# Patient Record
Sex: FEMALE | Race: BLACK OR AFRICAN AMERICAN | NOT HISPANIC OR LATINO | Employment: OTHER | RURAL
[De-identification: names, ages, dates, MRNs, and addresses within clinical notes are randomized per-mention and may not be internally consistent; named-entity substitution may affect disease eponyms.]

---

## 2019-03-14 ENCOUNTER — HISTORICAL (OUTPATIENT)
Dept: ADMINISTRATIVE | Facility: HOSPITAL | Age: 54
End: 2019-03-14

## 2019-03-18 LAB
LAB AP CLINICAL INFORMATION: NORMAL
LAB AP GENERAL CAT - HISTORICAL: NORMAL
LAB AP INTERPRETATION/RESULT - HISTORICAL: NEGATIVE
LAB AP SPECIMEN ADEQUACY - HISTORICAL: NORMAL
LAB AP SPECIMEN SUBMITTED - HISTORICAL: NORMAL

## 2019-04-22 ENCOUNTER — HISTORICAL (OUTPATIENT)
Dept: ADMINISTRATIVE | Facility: HOSPITAL | Age: 54
End: 2019-04-22

## 2019-04-23 LAB
LAB AP CLINICAL INFORMATION: NORMAL
LAB AP DIAGNOSIS - HISTORICAL: NORMAL
LAB AP GROSS PATHOLOGY - HISTORICAL: NORMAL
LAB AP SPECIMEN SUBMITTED - HISTORICAL: NORMAL

## 2020-09-11 ENCOUNTER — HISTORICAL (OUTPATIENT)
Dept: ADMINISTRATIVE | Facility: HOSPITAL | Age: 55
End: 2020-09-11

## 2021-05-04 ENCOUNTER — OFFICE VISIT (OUTPATIENT)
Dept: ORTHOPEDICS | Facility: CLINIC | Age: 56
End: 2021-05-04
Payer: MEDICARE

## 2021-05-04 VITALS — WEIGHT: 193 LBS | HEIGHT: 62 IN | BODY MASS INDEX: 35.51 KG/M2

## 2021-05-04 DIAGNOSIS — M17.11 LOCALIZED OSTEOARTHRITIS OF RIGHT KNEE: Primary | ICD-10-CM

## 2021-05-04 DIAGNOSIS — M17.11 PRIMARY OSTEOARTHRITIS OF RIGHT KNEE: Primary | ICD-10-CM

## 2021-05-04 PROCEDURE — 20610 LARGE JOINT ASPIRATION/INJECTION: R SUPRA PATELLAR BURSA: ICD-10-PCS | Mod: RT,,, | Performed by: ORTHOPAEDIC SURGERY

## 2021-05-04 PROCEDURE — 20610 DRAIN/INJ JOINT/BURSA W/O US: CPT | Mod: RT,,, | Performed by: ORTHOPAEDIC SURGERY

## 2021-05-04 RX ORDER — TRIAMCINOLONE ACETONIDE 40 MG/ML
40 INJECTION, SUSPENSION INTRA-ARTICULAR; INTRAMUSCULAR
Status: DISCONTINUED | OUTPATIENT
Start: 2021-05-04 | End: 2021-05-04 | Stop reason: HOSPADM

## 2021-05-04 RX ORDER — HYDROCHLOROTHIAZIDE 25 MG/1
25 TABLET ORAL DAILY
COMMUNITY
Start: 2021-03-26

## 2021-05-04 RX ORDER — CYCLOBENZAPRINE HCL 10 MG
TABLET ORAL
Status: ON HOLD | COMMUNITY
Start: 2021-03-26 | End: 2023-07-12 | Stop reason: SDUPTHER

## 2021-05-04 RX ORDER — ANASTROZOLE 1 MG/1
1 TABLET ORAL DAILY
COMMUNITY
Start: 2021-03-30

## 2021-05-04 RX ORDER — GLIPIZIDE 10 MG/1
TABLET ORAL
COMMUNITY
Start: 2021-03-26

## 2021-05-04 RX ORDER — BUPIVACAINE HYDROCHLORIDE 5 MG/ML
3 INJECTION, SOLUTION PERINEURAL
Status: DISCONTINUED | OUTPATIENT
Start: 2021-05-04 | End: 2021-05-04 | Stop reason: HOSPADM

## 2021-05-04 RX ORDER — AMLODIPINE BESYLATE 5 MG/1
5 TABLET ORAL DAILY
COMMUNITY
Start: 2021-03-26

## 2021-05-04 RX ADMIN — BUPIVACAINE HYDROCHLORIDE 3 ML: 5 INJECTION, SOLUTION PERINEURAL at 09:05

## 2021-05-04 RX ADMIN — TRIAMCINOLONE ACETONIDE 40 MG: 40 INJECTION, SUSPENSION INTRA-ARTICULAR; INTRAMUSCULAR at 09:05

## 2021-08-03 DIAGNOSIS — M25.561 RIGHT KNEE PAIN, UNSPECIFIED CHRONICITY: Primary | ICD-10-CM

## 2021-08-05 ENCOUNTER — OFFICE VISIT (OUTPATIENT)
Dept: OBSTETRICS AND GYNECOLOGY | Facility: CLINIC | Age: 56
End: 2021-08-05
Payer: MEDICARE

## 2021-08-05 ENCOUNTER — HOSPITAL ENCOUNTER (OUTPATIENT)
Dept: RADIOLOGY | Facility: HOSPITAL | Age: 56
Discharge: HOME | End: 2021-08-05
Attending: ORTHOPAEDIC SURGERY
Payer: MEDICARE

## 2021-08-05 ENCOUNTER — OFFICE VISIT (OUTPATIENT)
Dept: ORTHOPEDICS | Facility: CLINIC | Age: 56
End: 2021-08-05
Payer: MEDICARE

## 2021-08-05 VITALS
TEMPERATURE: 98 F | BODY MASS INDEX: 35.55 KG/M2 | WEIGHT: 193.19 LBS | HEART RATE: 109 BPM | DIASTOLIC BLOOD PRESSURE: 86 MMHG | RESPIRATION RATE: 20 BRPM | SYSTOLIC BLOOD PRESSURE: 149 MMHG | HEIGHT: 62 IN

## 2021-08-05 VITALS — WEIGHT: 193 LBS | HEIGHT: 62 IN | BODY MASS INDEX: 35.51 KG/M2

## 2021-08-05 DIAGNOSIS — Z01.419 ENCOUNTER FOR WELL WOMAN EXAM WITH ROUTINE GYNECOLOGICAL EXAM: Primary | ICD-10-CM

## 2021-08-05 DIAGNOSIS — S83.241D ACUTE MEDIAL MENISCUS TEAR OF RIGHT KNEE, SUBSEQUENT ENCOUNTER: Primary | ICD-10-CM

## 2021-08-05 DIAGNOSIS — M25.561 RIGHT KNEE PAIN, UNSPECIFIED CHRONICITY: ICD-10-CM

## 2021-08-05 PROBLEM — S83.241A ACUTE MEDIAL MENISCUS TEAR OF RIGHT KNEE: Status: ACTIVE | Noted: 2021-08-05

## 2021-08-05 PROCEDURE — 99396 PR PREVENTIVE VISIT,EST,40-64: ICD-10-PCS | Mod: S$PBB,,, | Performed by: OBSTETRICS & GYNECOLOGY

## 2021-08-05 PROCEDURE — 3079F PR MOST RECENT DIASTOLIC BLOOD PRESSURE 80-89 MM HG: ICD-10-PCS | Mod: CPTII,,, | Performed by: OBSTETRICS & GYNECOLOGY

## 2021-08-05 PROCEDURE — 73562 XR KNEE ORTHO RIGHT: ICD-10-PCS | Mod: 26,RT,, | Performed by: ORTHOPAEDIC SURGERY

## 2021-08-05 PROCEDURE — 99214 OFFICE O/P EST MOD 30 MIN: CPT | Mod: PBBFAC | Performed by: OBSTETRICS & GYNECOLOGY

## 2021-08-05 PROCEDURE — 99214 OFFICE O/P EST MOD 30 MIN: CPT | Mod: ,,, | Performed by: ORTHOPAEDIC SURGERY

## 2021-08-05 PROCEDURE — 3079F DIAST BP 80-89 MM HG: CPT | Mod: CPTII,,, | Performed by: OBSTETRICS & GYNECOLOGY

## 2021-08-05 PROCEDURE — 3008F PR BODY MASS INDEX (BMI) DOCUMENTED: ICD-10-PCS | Mod: CPTII,,, | Performed by: OBSTETRICS & GYNECOLOGY

## 2021-08-05 PROCEDURE — 1159F MED LIST DOCD IN RCRD: CPT | Mod: CPTII,,, | Performed by: OBSTETRICS & GYNECOLOGY

## 2021-08-05 PROCEDURE — 99396 PREV VISIT EST AGE 40-64: CPT | Mod: S$PBB,,, | Performed by: OBSTETRICS & GYNECOLOGY

## 2021-08-05 PROCEDURE — 3008F BODY MASS INDEX DOCD: CPT | Mod: CPTII,,, | Performed by: OBSTETRICS & GYNECOLOGY

## 2021-08-05 PROCEDURE — 99214 PR OFFICE/OUTPT VISIT, EST, LEVL IV, 30-39 MIN: ICD-10-PCS | Mod: ,,, | Performed by: ORTHOPAEDIC SURGERY

## 2021-08-05 PROCEDURE — 73560 X-RAY EXAM OF KNEE 1 OR 2: CPT | Mod: TC,LT,59

## 2021-08-05 PROCEDURE — 3077F PR MOST RECENT SYSTOLIC BLOOD PRESSURE >= 140 MM HG: ICD-10-PCS | Mod: CPTII,,, | Performed by: OBSTETRICS & GYNECOLOGY

## 2021-08-05 PROCEDURE — 1159F PR MEDICATION LIST DOCUMENTED IN MEDICAL RECORD: ICD-10-PCS | Mod: CPTII,,, | Performed by: OBSTETRICS & GYNECOLOGY

## 2021-08-05 PROCEDURE — 73562 X-RAY EXAM OF KNEE 3: CPT | Mod: 26,RT,, | Performed by: ORTHOPAEDIC SURGERY

## 2021-08-05 PROCEDURE — 3077F SYST BP >= 140 MM HG: CPT | Mod: CPTII,,, | Performed by: OBSTETRICS & GYNECOLOGY

## 2021-08-05 RX ORDER — CALCIUM CARBONATE 600 MG
600 TABLET ORAL DAILY
Status: ON HOLD | COMMUNITY
End: 2023-07-12 | Stop reason: SDUPTHER

## 2021-08-05 RX ORDER — ROSUVASTATIN CALCIUM 10 MG/1
10 TABLET, COATED ORAL DAILY
COMMUNITY

## 2021-08-05 RX ORDER — HYDROCODONE BITARTRATE AND ACETAMINOPHEN 7.5; 325 MG/1; MG/1
1 TABLET ORAL 3 TIMES DAILY
Status: ON HOLD | COMMUNITY
End: 2023-06-27 | Stop reason: HOSPADM

## 2021-08-05 RX ORDER — MUPIROCIN 20 MG/G
OINTMENT TOPICAL
Status: CANCELLED | OUTPATIENT
Start: 2021-08-05

## 2021-08-05 RX ORDER — SODIUM CHLORIDE 9 MG/ML
INJECTION, SOLUTION INTRAVENOUS CONTINUOUS
Status: CANCELLED | OUTPATIENT
Start: 2021-08-05

## 2021-08-05 RX ORDER — METFORMIN HYDROCHLORIDE 1000 MG/1
1000 TABLET ORAL 2 TIMES DAILY WITH MEALS
COMMUNITY

## 2021-09-07 DIAGNOSIS — Z11.59 SPECIAL SCREENING EXAMINATION FOR UNSPECIFIED VIRAL DISEASE: Primary | ICD-10-CM

## 2021-09-08 ENCOUNTER — ANESTHESIA EVENT (OUTPATIENT)
Dept: SURGERY | Facility: HOSPITAL | Age: 56
End: 2021-09-08
Payer: MEDICARE

## 2021-09-08 ENCOUNTER — ANESTHESIA (OUTPATIENT)
Dept: SURGERY | Facility: HOSPITAL | Age: 56
End: 2021-09-08
Payer: MEDICARE

## 2021-09-08 ENCOUNTER — HOSPITAL ENCOUNTER (OUTPATIENT)
Facility: HOSPITAL | Age: 56
Discharge: HOME | End: 2021-09-08
Attending: ORTHOPAEDIC SURGERY | Admitting: ORTHOPAEDIC SURGERY
Payer: MEDICARE

## 2021-09-08 VITALS
RESPIRATION RATE: 16 BRPM | TEMPERATURE: 98 F | WEIGHT: 189 LBS | HEART RATE: 96 BPM | SYSTOLIC BLOOD PRESSURE: 170 MMHG | DIASTOLIC BLOOD PRESSURE: 89 MMHG | BODY MASS INDEX: 34.57 KG/M2 | OXYGEN SATURATION: 99 %

## 2021-09-08 DIAGNOSIS — S83.241D ACUTE MEDIAL MENISCUS TEAR OF RIGHT KNEE, SUBSEQUENT ENCOUNTER: Primary | ICD-10-CM

## 2021-09-08 LAB
GLUCOSE SERPL-MCNC: 153 MG/DL (ref 70–105)
GLUCOSE SERPL-MCNC: 185 MG/DL (ref 70–105)

## 2021-09-08 PROCEDURE — D9220A PRA ANESTHESIA: Mod: ANES,,, | Performed by: ANESTHESIOLOGY

## 2021-09-08 PROCEDURE — 29881 PR KNEE SCOPE SINGLE MENISECECTOMY: ICD-10-PCS | Mod: RT,,, | Performed by: ORTHOPAEDIC SURGERY

## 2021-09-08 PROCEDURE — 82962 GLUCOSE BLOOD TEST: CPT | Mod: 91

## 2021-09-08 PROCEDURE — 37000009 HC ANESTHESIA EA ADD 15 MINS: Performed by: ORTHOPAEDIC SURGERY

## 2021-09-08 PROCEDURE — 63600175 PHARM REV CODE 636 W HCPCS: Performed by: ANESTHESIOLOGY

## 2021-09-08 PROCEDURE — 71000016 HC POSTOP RECOV ADDL HR: Performed by: ORTHOPAEDIC SURGERY

## 2021-09-08 PROCEDURE — 29879 ARTHRS KNE SRG ABRASJ ARTHRP: CPT | Mod: RT,,, | Performed by: ORTHOPAEDIC SURGERY

## 2021-09-08 PROCEDURE — 36000711: Performed by: ORTHOPAEDIC SURGERY

## 2021-09-08 PROCEDURE — 37000008 HC ANESTHESIA 1ST 15 MINUTES: Performed by: ORTHOPAEDIC SURGERY

## 2021-09-08 PROCEDURE — 29881 ARTHRS KNE SRG MNISECTMY M/L: CPT | Mod: RT,,, | Performed by: ORTHOPAEDIC SURGERY

## 2021-09-08 PROCEDURE — 71000033 HC RECOVERY, INTIAL HOUR: Performed by: ORTHOPAEDIC SURGERY

## 2021-09-08 PROCEDURE — D9220A PRA ANESTHESIA: Mod: CRNA,,, | Performed by: NURSE ANESTHETIST, CERTIFIED REGISTERED

## 2021-09-08 PROCEDURE — D9220A PRA ANESTHESIA: ICD-10-PCS | Mod: ANES,,, | Performed by: ANESTHESIOLOGY

## 2021-09-08 PROCEDURE — 27100168 OPTIME MED/SURG SUP & DEVICES NON-STERILE SUPPLY: Performed by: ORTHOPAEDIC SURGERY

## 2021-09-08 PROCEDURE — 71000015 HC POSTOP RECOV 1ST HR: Performed by: ORTHOPAEDIC SURGERY

## 2021-09-08 PROCEDURE — 01400 ANES OPN/ARTHRS KNEE JT NOS: CPT | Performed by: ORTHOPAEDIC SURGERY

## 2021-09-08 PROCEDURE — D9220A PRA ANESTHESIA: ICD-10-PCS | Mod: CRNA,,, | Performed by: NURSE ANESTHETIST, CERTIFIED REGISTERED

## 2021-09-08 PROCEDURE — 25000003 PHARM REV CODE 250: Performed by: ORTHOPAEDIC SURGERY

## 2021-09-08 PROCEDURE — 36000710: Performed by: ORTHOPAEDIC SURGERY

## 2021-09-08 PROCEDURE — 63600175 PHARM REV CODE 636 W HCPCS: Performed by: ORTHOPAEDIC SURGERY

## 2021-09-08 PROCEDURE — 97161 PT EVAL LOW COMPLEX 20 MIN: CPT

## 2021-09-08 PROCEDURE — 27201423 OPTIME MED/SURG SUP & DEVICES STERILE SUPPLY: Performed by: ORTHOPAEDIC SURGERY

## 2021-09-08 PROCEDURE — 25000003 PHARM REV CODE 250: Performed by: NURSE ANESTHETIST, CERTIFIED REGISTERED

## 2021-09-08 PROCEDURE — 29879 PR KNEE SCOPE,ABRASN ARTHROPLASTY: ICD-10-PCS | Mod: RT,,, | Performed by: ORTHOPAEDIC SURGERY

## 2021-09-08 PROCEDURE — 63600175 PHARM REV CODE 636 W HCPCS: Performed by: NURSE ANESTHETIST, CERTIFIED REGISTERED

## 2021-09-08 RX ORDER — LIDOCAINE HYDROCHLORIDE 10 MG/ML
1 INJECTION, SOLUTION EPIDURAL; INFILTRATION; INTRACAUDAL; PERINEURAL ONCE
Status: CANCELLED | OUTPATIENT
Start: 2021-09-08 | End: 2021-09-08

## 2021-09-08 RX ORDER — MEPERIDINE HYDROCHLORIDE 25 MG/ML
25 INJECTION INTRAMUSCULAR; INTRAVENOUS; SUBCUTANEOUS EVERY 10 MIN PRN
Status: DISCONTINUED | OUTPATIENT
Start: 2021-09-08 | End: 2021-09-08 | Stop reason: HOSPADM

## 2021-09-08 RX ORDER — DIPHENHYDRAMINE HYDROCHLORIDE 50 MG/ML
25 INJECTION INTRAMUSCULAR; INTRAVENOUS EVERY 6 HOURS PRN
Status: DISCONTINUED | OUTPATIENT
Start: 2021-09-08 | End: 2021-09-08 | Stop reason: HOSPADM

## 2021-09-08 RX ORDER — MORPHINE SULFATE 10 MG/ML
4 INJECTION INTRAMUSCULAR; INTRAVENOUS; SUBCUTANEOUS EVERY 5 MIN PRN
Status: DISCONTINUED | OUTPATIENT
Start: 2021-09-08 | End: 2021-09-08 | Stop reason: HOSPADM

## 2021-09-08 RX ORDER — FENTANYL CITRATE 50 UG/ML
INJECTION, SOLUTION INTRAMUSCULAR; INTRAVENOUS
Status: DISCONTINUED | OUTPATIENT
Start: 2021-09-08 | End: 2021-09-08

## 2021-09-08 RX ORDER — LIDOCAINE HYDROCHLORIDE 20 MG/ML
INJECTION, SOLUTION EPIDURAL; INFILTRATION; INTRACAUDAL; PERINEURAL
Status: DISCONTINUED | OUTPATIENT
Start: 2021-09-08 | End: 2021-09-08

## 2021-09-08 RX ORDER — OXYCODONE HYDROCHLORIDE 5 MG/1
5 TABLET ORAL
Status: DISCONTINUED | OUTPATIENT
Start: 2021-09-08 | End: 2021-09-08 | Stop reason: HOSPADM

## 2021-09-08 RX ORDER — OXYCODONE HYDROCHLORIDE 5 MG/1
10 TABLET ORAL EVERY 4 HOURS PRN
Status: DISCONTINUED | OUTPATIENT
Start: 2021-09-08 | End: 2021-09-08 | Stop reason: HOSPADM

## 2021-09-08 RX ORDER — CEFAZOLIN SODIUM 1 G/3ML
INJECTION, POWDER, FOR SOLUTION INTRAMUSCULAR; INTRAVENOUS
Status: DISCONTINUED | OUTPATIENT
Start: 2021-09-08 | End: 2021-09-08

## 2021-09-08 RX ORDER — SODIUM CHLORIDE 9 MG/ML
INJECTION, SOLUTION INTRAVENOUS CONTINUOUS
Status: DISCONTINUED | OUTPATIENT
Start: 2021-09-08 | End: 2021-09-08 | Stop reason: HOSPADM

## 2021-09-08 RX ORDER — OXYCODONE AND ACETAMINOPHEN 5; 325 MG/1; MG/1
1 TABLET ORAL EVERY 4 HOURS PRN
Status: DISCONTINUED | OUTPATIENT
Start: 2021-09-08 | End: 2021-09-08 | Stop reason: HOSPADM

## 2021-09-08 RX ORDER — DEXAMETHASONE SODIUM PHOSPHATE 4 MG/ML
INJECTION, SOLUTION INTRA-ARTICULAR; INTRALESIONAL; INTRAMUSCULAR; INTRAVENOUS; SOFT TISSUE
Status: DISCONTINUED | OUTPATIENT
Start: 2021-09-08 | End: 2021-09-08

## 2021-09-08 RX ORDER — ONDANSETRON 2 MG/ML
INJECTION INTRAMUSCULAR; INTRAVENOUS
Status: DISCONTINUED | OUTPATIENT
Start: 2021-09-08 | End: 2021-09-08

## 2021-09-08 RX ORDER — ONDANSETRON 4 MG/1
4 TABLET, ORALLY DISINTEGRATING ORAL EVERY 6 HOURS PRN
Qty: 30 TABLET | Refills: 0 | Status: SHIPPED | OUTPATIENT
Start: 2021-09-08

## 2021-09-08 RX ORDER — MORPHINE SULFATE 8 MG/ML
INJECTION INTRAMUSCULAR; INTRAVENOUS; SUBCUTANEOUS
Status: DISCONTINUED | OUTPATIENT
Start: 2021-09-08 | End: 2021-09-08

## 2021-09-08 RX ORDER — MIDAZOLAM HYDROCHLORIDE 1 MG/ML
INJECTION INTRAMUSCULAR; INTRAVENOUS
Status: DISCONTINUED | OUTPATIENT
Start: 2021-09-08 | End: 2021-09-08

## 2021-09-08 RX ORDER — PROPOFOL 10 MG/ML
VIAL (ML) INTRAVENOUS
Status: DISCONTINUED | OUTPATIENT
Start: 2021-09-08 | End: 2021-09-08

## 2021-09-08 RX ORDER — SODIUM CHLORIDE, SODIUM LACTATE, POTASSIUM CHLORIDE, CALCIUM CHLORIDE 600; 310; 30; 20 MG/100ML; MG/100ML; MG/100ML; MG/100ML
INJECTION, SOLUTION INTRAVENOUS CONTINUOUS
Status: CANCELLED | OUTPATIENT
Start: 2021-09-08

## 2021-09-08 RX ORDER — ONDANSETRON 2 MG/ML
4 INJECTION INTRAMUSCULAR; INTRAVENOUS DAILY PRN
Status: DISCONTINUED | OUTPATIENT
Start: 2021-09-08 | End: 2021-09-08 | Stop reason: HOSPADM

## 2021-09-08 RX ORDER — EPINEPHRINE 1 MG/ML
INJECTION, SOLUTION INTRACARDIAC; INTRAMUSCULAR; INTRAVENOUS; SUBCUTANEOUS
Status: DISCONTINUED | OUTPATIENT
Start: 2021-09-08 | End: 2021-09-08 | Stop reason: HOSPADM

## 2021-09-08 RX ORDER — CEFAZOLIN SODIUM 2 G/50ML
2 SOLUTION INTRAVENOUS
Status: DISCONTINUED | OUTPATIENT
Start: 2021-09-08 | End: 2021-09-08 | Stop reason: HOSPADM

## 2021-09-08 RX ORDER — HYDROMORPHONE HYDROCHLORIDE 2 MG/ML
0.5 INJECTION, SOLUTION INTRAMUSCULAR; INTRAVENOUS; SUBCUTANEOUS EVERY 5 MIN PRN
Status: DISCONTINUED | OUTPATIENT
Start: 2021-09-08 | End: 2021-09-08 | Stop reason: HOSPADM

## 2021-09-08 RX ORDER — ONDANSETRON 4 MG/1
8 TABLET, ORALLY DISINTEGRATING ORAL EVERY 8 HOURS PRN
Status: DISCONTINUED | OUTPATIENT
Start: 2021-09-08 | End: 2021-09-08 | Stop reason: HOSPADM

## 2021-09-08 RX ORDER — LABETALOL HYDROCHLORIDE 5 MG/ML
INJECTION, SOLUTION INTRAVENOUS
Status: DISCONTINUED | OUTPATIENT
Start: 2021-09-08 | End: 2021-09-08

## 2021-09-08 RX ORDER — HYDROCODONE BITARTRATE AND ACETAMINOPHEN 5; 325 MG/1; MG/1
1 TABLET ORAL EVERY 6 HOURS PRN
Qty: 30 TABLET | Refills: 0 | Status: ON HOLD | OUTPATIENT
Start: 2021-09-08 | End: 2023-06-27 | Stop reason: HOSPADM

## 2021-09-08 RX ADMIN — CEFAZOLIN 2 G: 1 INJECTION, POWDER, FOR SOLUTION INTRAMUSCULAR; INTRAVENOUS; PARENTERAL at 12:09

## 2021-09-08 RX ADMIN — SODIUM CHLORIDE: 9 INJECTION, SOLUTION INTRAVENOUS at 10:09

## 2021-09-08 RX ADMIN — MORPHINE SULFATE 8 MG: 8 INJECTION INTRAVENOUS at 01:09

## 2021-09-08 RX ADMIN — MIDAZOLAM HYDROCHLORIDE 2 MG: 1 INJECTION, SOLUTION INTRAMUSCULAR; INTRAVENOUS at 12:09

## 2021-09-08 RX ADMIN — FENTANYL CITRATE 100 MCG: 50 INJECTION INTRAMUSCULAR; INTRAVENOUS at 12:09

## 2021-09-08 RX ADMIN — ONDANSETRON 4 MG: 2 INJECTION INTRAMUSCULAR; INTRAVENOUS at 12:09

## 2021-09-08 RX ADMIN — PROPOFOL 200 MG: 10 INJECTION, EMULSION INTRAVENOUS at 12:09

## 2021-09-08 RX ADMIN — LABETALOL HYDROCHLORIDE 10 MG: 5 INJECTION, SOLUTION INTRAVENOUS at 01:09

## 2021-09-08 RX ADMIN — DEXAMETHASONE SODIUM PHOSPHATE 10 MG: 4 INJECTION, SOLUTION INTRA-ARTICULAR; INTRALESIONAL; INTRAMUSCULAR; INTRAVENOUS; SOFT TISSUE at 12:09

## 2021-09-08 RX ADMIN — HYDROMORPHONE HYDROCHLORIDE 0.5 MG: 2 INJECTION, SOLUTION INTRAMUSCULAR; INTRAVENOUS; SUBCUTANEOUS at 01:09

## 2021-09-08 RX ADMIN — LIDOCAINE HYDROCHLORIDE 100 MG: 20 INJECTION, SOLUTION EPIDURAL; INFILTRATION; INTRACAUDAL; PERINEURAL at 12:09

## 2021-09-10 DIAGNOSIS — Z98.890 S/P RIGHT KNEE ARTHROSCOPY: Primary | ICD-10-CM

## 2021-09-15 ENCOUNTER — CLINICAL SUPPORT (OUTPATIENT)
Dept: REHABILITATION | Facility: HOSPITAL | Age: 56
End: 2021-09-15
Payer: MEDICARE

## 2021-09-15 DIAGNOSIS — S83.241D ACUTE MEDIAL MENISCUS TEAR OF RIGHT KNEE, SUBSEQUENT ENCOUNTER: ICD-10-CM

## 2021-09-15 PROCEDURE — 97162 PT EVAL MOD COMPLEX 30 MIN: CPT

## 2021-09-15 PROCEDURE — 97110 THERAPEUTIC EXERCISES: CPT

## 2021-09-21 ENCOUNTER — OFFICE VISIT (OUTPATIENT)
Dept: ORTHOPEDICS | Facility: CLINIC | Age: 56
End: 2021-09-21
Payer: MEDICARE

## 2021-09-21 DIAGNOSIS — Z98.890 S/P RIGHT KNEE ARTHROSCOPY: Primary | ICD-10-CM

## 2021-09-21 PROCEDURE — 1159F PR MEDICATION LIST DOCUMENTED IN MEDICAL RECORD: ICD-10-PCS | Mod: CPTII,,, | Performed by: ORTHOPAEDIC SURGERY

## 2021-09-21 PROCEDURE — 1159F MED LIST DOCD IN RCRD: CPT | Mod: CPTII,,, | Performed by: ORTHOPAEDIC SURGERY

## 2021-09-21 PROCEDURE — 99024 POSTOP FOLLOW-UP VISIT: CPT | Mod: ,,, | Performed by: ORTHOPAEDIC SURGERY

## 2021-09-21 PROCEDURE — 99024 PR POST-OP FOLLOW-UP VISIT: ICD-10-PCS | Mod: ,,, | Performed by: ORTHOPAEDIC SURGERY

## 2021-09-24 ENCOUNTER — CLINICAL SUPPORT (OUTPATIENT)
Dept: REHABILITATION | Facility: HOSPITAL | Age: 56
End: 2021-09-24
Payer: MEDICARE

## 2021-09-24 DIAGNOSIS — S83.241A ACUTE MEDIAL MENISCUS TEAR OF RIGHT KNEE, INITIAL ENCOUNTER: ICD-10-CM

## 2021-09-24 PROCEDURE — 97110 THERAPEUTIC EXERCISES: CPT

## 2021-09-28 ENCOUNTER — CLINICAL SUPPORT (OUTPATIENT)
Dept: REHABILITATION | Facility: HOSPITAL | Age: 56
End: 2021-09-28
Payer: MEDICARE

## 2021-09-28 DIAGNOSIS — S83.241A ACUTE MEDIAL MENISCUS TEAR OF RIGHT KNEE, INITIAL ENCOUNTER: Primary | ICD-10-CM

## 2021-09-28 PROCEDURE — 97110 THERAPEUTIC EXERCISES: CPT | Mod: CQ

## 2021-09-30 ENCOUNTER — CLINICAL SUPPORT (OUTPATIENT)
Dept: REHABILITATION | Facility: HOSPITAL | Age: 56
End: 2021-09-30
Payer: MEDICARE

## 2021-09-30 DIAGNOSIS — S83.241A ACUTE MEDIAL MENISCUS TEAR OF RIGHT KNEE, INITIAL ENCOUNTER: ICD-10-CM

## 2021-09-30 PROCEDURE — 97110 THERAPEUTIC EXERCISES: CPT

## 2021-10-06 ENCOUNTER — CLINICAL SUPPORT (OUTPATIENT)
Dept: REHABILITATION | Facility: HOSPITAL | Age: 56
End: 2021-10-06
Payer: MEDICARE

## 2021-10-06 DIAGNOSIS — S83.241A ACUTE MEDIAL MENISCUS TEAR OF RIGHT KNEE, INITIAL ENCOUNTER: Primary | ICD-10-CM

## 2021-10-06 PROCEDURE — 97110 THERAPEUTIC EXERCISES: CPT | Mod: CQ

## 2021-10-08 ENCOUNTER — CLINICAL SUPPORT (OUTPATIENT)
Dept: REHABILITATION | Facility: HOSPITAL | Age: 56
End: 2021-10-08
Payer: MEDICARE

## 2021-10-08 DIAGNOSIS — S83.241A ACUTE MEDIAL MENISCUS TEAR OF RIGHT KNEE, INITIAL ENCOUNTER: Primary | ICD-10-CM

## 2021-10-08 PROCEDURE — 97110 THERAPEUTIC EXERCISES: CPT | Mod: CQ

## 2021-10-11 ENCOUNTER — CLINICAL SUPPORT (OUTPATIENT)
Dept: REHABILITATION | Facility: HOSPITAL | Age: 56
End: 2021-10-11
Payer: MEDICARE

## 2021-10-11 DIAGNOSIS — S83.241A ACUTE MEDIAL MENISCUS TEAR OF RIGHT KNEE, INITIAL ENCOUNTER: Primary | ICD-10-CM

## 2021-10-11 PROCEDURE — 97110 THERAPEUTIC EXERCISES: CPT | Mod: KX

## 2021-10-15 ENCOUNTER — CLINICAL SUPPORT (OUTPATIENT)
Dept: REHABILITATION | Facility: HOSPITAL | Age: 56
End: 2021-10-15
Payer: MEDICARE

## 2021-10-15 DIAGNOSIS — S83.241A ACUTE MEDIAL MENISCUS TEAR OF RIGHT KNEE, INITIAL ENCOUNTER: Primary | ICD-10-CM

## 2021-10-15 PROCEDURE — 97110 THERAPEUTIC EXERCISES: CPT | Mod: CQ

## 2021-10-20 ENCOUNTER — CLINICAL SUPPORT (OUTPATIENT)
Dept: REHABILITATION | Facility: HOSPITAL | Age: 56
End: 2021-10-20
Payer: MEDICARE

## 2021-10-20 DIAGNOSIS — S83.241A ACUTE MEDIAL MENISCUS TEAR OF RIGHT KNEE, INITIAL ENCOUNTER: Primary | ICD-10-CM

## 2021-10-20 PROCEDURE — 97110 THERAPEUTIC EXERCISES: CPT | Mod: CQ

## 2021-10-22 ENCOUNTER — CLINICAL SUPPORT (OUTPATIENT)
Dept: REHABILITATION | Facility: HOSPITAL | Age: 56
End: 2021-10-22
Payer: MEDICARE

## 2021-10-22 DIAGNOSIS — S83.241A ACUTE MEDIAL MENISCUS TEAR OF RIGHT KNEE, INITIAL ENCOUNTER: ICD-10-CM

## 2021-10-22 PROCEDURE — 97110 THERAPEUTIC EXERCISES: CPT

## 2021-10-26 ENCOUNTER — OFFICE VISIT (OUTPATIENT)
Dept: ORTHOPEDICS | Facility: CLINIC | Age: 56
End: 2021-10-26
Payer: MEDICARE

## 2021-10-26 DIAGNOSIS — Z98.890 S/P ARTHROSCOPY OF KNEE: Primary | ICD-10-CM

## 2021-10-26 PROCEDURE — 99024 POSTOP FOLLOW-UP VISIT: CPT | Mod: ,,, | Performed by: ORTHOPAEDIC SURGERY

## 2021-10-26 PROCEDURE — 99024 PR POST-OP FOLLOW-UP VISIT: ICD-10-PCS | Mod: ,,, | Performed by: ORTHOPAEDIC SURGERY

## 2021-10-27 ENCOUNTER — CLINICAL SUPPORT (OUTPATIENT)
Dept: REHABILITATION | Facility: HOSPITAL | Age: 56
End: 2021-10-27
Payer: MEDICARE

## 2021-10-27 DIAGNOSIS — S83.241A ACUTE MEDIAL MENISCUS TEAR OF RIGHT KNEE, INITIAL ENCOUNTER: Primary | ICD-10-CM

## 2021-10-27 PROCEDURE — 97110 THERAPEUTIC EXERCISES: CPT | Mod: CQ

## 2021-10-29 ENCOUNTER — CLINICAL SUPPORT (OUTPATIENT)
Dept: REHABILITATION | Facility: HOSPITAL | Age: 56
End: 2021-10-29
Payer: MEDICARE

## 2021-10-29 DIAGNOSIS — S83.241A ACUTE MEDIAL MENISCUS TEAR OF RIGHT KNEE, INITIAL ENCOUNTER: Primary | ICD-10-CM

## 2021-10-29 PROCEDURE — 97110 THERAPEUTIC EXERCISES: CPT | Mod: KX

## 2021-10-29 PROCEDURE — 97116 GAIT TRAINING THERAPY: CPT | Mod: KX

## 2021-11-03 ENCOUNTER — CLINICAL SUPPORT (OUTPATIENT)
Dept: REHABILITATION | Facility: HOSPITAL | Age: 56
End: 2021-11-03
Payer: COMMERCIAL

## 2021-11-03 DIAGNOSIS — S83.241A ACUTE MEDIAL MENISCUS TEAR OF RIGHT KNEE, INITIAL ENCOUNTER: ICD-10-CM

## 2021-11-03 PROCEDURE — 97110 THERAPEUTIC EXERCISES: CPT | Mod: CQ

## 2021-11-05 ENCOUNTER — CLINICAL SUPPORT (OUTPATIENT)
Dept: REHABILITATION | Facility: HOSPITAL | Age: 56
End: 2021-11-05
Payer: MEDICARE

## 2021-11-05 DIAGNOSIS — S83.241A ACUTE MEDIAL MENISCUS TEAR OF RIGHT KNEE, INITIAL ENCOUNTER: ICD-10-CM

## 2021-11-05 PROCEDURE — 97110 THERAPEUTIC EXERCISES: CPT | Mod: CQ

## 2021-11-10 ENCOUNTER — CLINICAL SUPPORT (OUTPATIENT)
Dept: REHABILITATION | Facility: HOSPITAL | Age: 56
End: 2021-11-10
Payer: MEDICARE

## 2021-11-10 DIAGNOSIS — S83.241A ACUTE MEDIAL MENISCUS TEAR OF RIGHT KNEE, INITIAL ENCOUNTER: Primary | ICD-10-CM

## 2021-11-10 PROCEDURE — 97110 THERAPEUTIC EXERCISES: CPT | Mod: CQ

## 2021-11-12 ENCOUNTER — CLINICAL SUPPORT (OUTPATIENT)
Dept: REHABILITATION | Facility: HOSPITAL | Age: 56
End: 2021-11-12
Payer: COMMERCIAL

## 2021-11-12 DIAGNOSIS — S83.241A ACUTE MEDIAL MENISCUS TEAR OF RIGHT KNEE, INITIAL ENCOUNTER: Primary | ICD-10-CM

## 2021-11-12 PROCEDURE — 97110 THERAPEUTIC EXERCISES: CPT | Mod: CQ

## 2021-11-16 ENCOUNTER — CLINICAL SUPPORT (OUTPATIENT)
Dept: REHABILITATION | Facility: HOSPITAL | Age: 56
End: 2021-11-16
Payer: MEDICARE

## 2021-11-16 DIAGNOSIS — S83.241A ACUTE MEDIAL MENISCUS TEAR OF RIGHT KNEE, INITIAL ENCOUNTER: Primary | ICD-10-CM

## 2021-11-16 PROCEDURE — 97110 THERAPEUTIC EXERCISES: CPT

## 2021-11-16 PROCEDURE — 97112 NEUROMUSCULAR REEDUCATION: CPT | Mod: KX

## 2021-11-19 ENCOUNTER — CLINICAL SUPPORT (OUTPATIENT)
Dept: REHABILITATION | Facility: HOSPITAL | Age: 56
End: 2021-11-19
Payer: COMMERCIAL

## 2021-11-19 DIAGNOSIS — S83.241D ACUTE MEDIAL MENISCUS TEAR OF RIGHT KNEE, SUBSEQUENT ENCOUNTER: ICD-10-CM

## 2021-11-19 PROCEDURE — 97110 THERAPEUTIC EXERCISES: CPT

## 2021-11-19 PROCEDURE — 97112 NEUROMUSCULAR REEDUCATION: CPT

## 2021-11-22 ENCOUNTER — CLINICAL SUPPORT (OUTPATIENT)
Dept: REHABILITATION | Facility: HOSPITAL | Age: 56
End: 2021-11-22
Payer: COMMERCIAL

## 2021-11-22 DIAGNOSIS — S83.241D ACUTE MEDIAL MENISCUS TEAR OF RIGHT KNEE, SUBSEQUENT ENCOUNTER: Primary | ICD-10-CM

## 2021-11-22 PROCEDURE — 97110 THERAPEUTIC EXERCISES: CPT | Mod: CQ

## 2021-11-22 PROCEDURE — 97112 NEUROMUSCULAR REEDUCATION: CPT | Mod: CQ

## 2021-11-23 ENCOUNTER — OFFICE VISIT (OUTPATIENT)
Dept: ORTHOPEDICS | Facility: CLINIC | Age: 56
End: 2021-11-23
Payer: MEDICARE

## 2021-11-23 DIAGNOSIS — Z98.890 S/P ARTHROSCOPY OF KNEE: Primary | ICD-10-CM

## 2021-11-23 PROCEDURE — 99024 POSTOP FOLLOW-UP VISIT: CPT | Mod: ,,, | Performed by: ORTHOPAEDIC SURGERY

## 2021-11-23 PROCEDURE — 99024 PR POST-OP FOLLOW-UP VISIT: ICD-10-PCS | Mod: ,,, | Performed by: ORTHOPAEDIC SURGERY

## 2021-11-24 ENCOUNTER — CLINICAL SUPPORT (OUTPATIENT)
Dept: REHABILITATION | Facility: HOSPITAL | Age: 56
End: 2021-11-24
Payer: MEDICARE

## 2021-11-24 DIAGNOSIS — S83.241D ACUTE MEDIAL MENISCUS TEAR OF RIGHT KNEE, SUBSEQUENT ENCOUNTER: Primary | ICD-10-CM

## 2021-11-24 PROCEDURE — 97112 NEUROMUSCULAR REEDUCATION: CPT | Mod: CQ

## 2021-11-24 PROCEDURE — 97110 THERAPEUTIC EXERCISES: CPT | Mod: CQ

## 2021-11-29 ENCOUNTER — CLINICAL SUPPORT (OUTPATIENT)
Dept: REHABILITATION | Facility: HOSPITAL | Age: 56
End: 2021-11-29
Payer: COMMERCIAL

## 2021-11-29 DIAGNOSIS — S83.241D ACUTE MEDIAL MENISCUS TEAR OF RIGHT KNEE, SUBSEQUENT ENCOUNTER: Primary | ICD-10-CM

## 2021-11-29 PROCEDURE — 97110 THERAPEUTIC EXERCISES: CPT | Mod: KX

## 2021-11-29 PROCEDURE — 97112 NEUROMUSCULAR REEDUCATION: CPT | Mod: KX

## 2021-12-02 ENCOUNTER — CLINICAL SUPPORT (OUTPATIENT)
Dept: REHABILITATION | Facility: HOSPITAL | Age: 56
End: 2021-12-02
Payer: MEDICARE

## 2021-12-02 DIAGNOSIS — S83.241D ACUTE MEDIAL MENISCUS TEAR OF RIGHT KNEE, SUBSEQUENT ENCOUNTER: Primary | ICD-10-CM

## 2021-12-02 PROCEDURE — 97110 THERAPEUTIC EXERCISES: CPT | Mod: CQ

## 2021-12-02 PROCEDURE — 97112 NEUROMUSCULAR REEDUCATION: CPT | Mod: CQ

## 2021-12-06 ENCOUNTER — CLINICAL SUPPORT (OUTPATIENT)
Dept: REHABILITATION | Facility: HOSPITAL | Age: 56
End: 2021-12-06
Payer: MEDICARE

## 2021-12-06 DIAGNOSIS — S83.241D ACUTE MEDIAL MENISCUS TEAR OF RIGHT KNEE, SUBSEQUENT ENCOUNTER: Primary | ICD-10-CM

## 2021-12-06 PROCEDURE — 97110 THERAPEUTIC EXERCISES: CPT | Mod: CQ

## 2021-12-06 PROCEDURE — 97112 NEUROMUSCULAR REEDUCATION: CPT | Mod: CQ

## 2021-12-10 ENCOUNTER — CLINICAL SUPPORT (OUTPATIENT)
Dept: REHABILITATION | Facility: HOSPITAL | Age: 56
End: 2021-12-10
Payer: COMMERCIAL

## 2021-12-10 DIAGNOSIS — S83.241D ACUTE MEDIAL MENISCUS TEAR OF RIGHT KNEE, SUBSEQUENT ENCOUNTER: Primary | ICD-10-CM

## 2021-12-10 PROCEDURE — 97110 THERAPEUTIC EXERCISES: CPT | Mod: KX

## 2021-12-10 PROCEDURE — 97112 NEUROMUSCULAR REEDUCATION: CPT | Mod: KX

## 2022-02-24 ENCOUNTER — OFFICE VISIT (OUTPATIENT)
Dept: ORTHOPEDICS | Facility: CLINIC | Age: 57
End: 2022-02-24
Payer: COMMERCIAL

## 2022-02-24 DIAGNOSIS — M17.11 LOCALIZED OSTEOARTHRITIS OF RIGHT KNEE: Primary | ICD-10-CM

## 2022-02-24 PROCEDURE — 1159F PR MEDICATION LIST DOCUMENTED IN MEDICAL RECORD: ICD-10-PCS | Mod: CPTII,,, | Performed by: ORTHOPAEDIC SURGERY

## 2022-02-24 PROCEDURE — 1159F MED LIST DOCD IN RCRD: CPT | Mod: CPTII,,, | Performed by: ORTHOPAEDIC SURGERY

## 2022-02-24 PROCEDURE — 99212 PR OFFICE/OUTPT VISIT, EST, LEVL II, 10-19 MIN: ICD-10-PCS | Mod: ,,, | Performed by: ORTHOPAEDIC SURGERY

## 2022-02-24 PROCEDURE — 99212 OFFICE O/P EST SF 10 MIN: CPT | Mod: ,,, | Performed by: ORTHOPAEDIC SURGERY

## 2022-02-24 NOTE — PROGRESS NOTES
HISTORY OF PRESENT ILLNESS:       Arthroscopy, Knee, With Chondroplasty - Right, Excision, Plica, Knee, Arthroscopic - Right, Arthroscopy, Knee, With Meniscectomy - Right, and Microfracture - Right 9/8/2021      Pt is here today for 4th post-operative followup of her knee arthroscopy.  she is doing well.  We have reviewed her findings and discussed plan of care and future treatment options, including the physical therapy plan.      She has had her pain return-- this occurred after an injection for her back.  Medial sided pain is present today                                                                               PHYSICAL EXAMINATION:     Incision sites healed well  No evidence of any erythema, infection or induration  Range of motion 0-120 degrees  Minimal effusion  2+ DP pulse  TTP on medial joint line  No swelling, no calf tenderness  - Mayur's sign  Negative medial joint line tendernes  Moderate quad atrophy                                                                                 ASSESSMENT:                                                                                                                                               1. Status post above, doing well.                                                                                                                               PLAN:                                                                                                                                                     1. Has right knee OA-- may require viscoinjection as her knee has flared back up.  Will see approval of injection-- she has a large medial femoral condyle cartilage defect and knee OA           There are no Patient Instructions on file for this visit.

## 2022-03-07 ENCOUNTER — OFFICE VISIT (OUTPATIENT)
Dept: ORTHOPEDICS | Facility: CLINIC | Age: 57
End: 2022-03-07
Payer: COMMERCIAL

## 2022-03-07 DIAGNOSIS — M17.11 LOCALIZED OSTEOARTHRITIS OF RIGHT KNEE: Primary | ICD-10-CM

## 2022-03-07 PROCEDURE — 20610 LARGE JOINT ASPIRATION/INJECTION: R PATELLAR BURSA: ICD-10-PCS | Mod: RT,,, | Performed by: NURSE PRACTITIONER

## 2022-03-07 PROCEDURE — 20610 DRAIN/INJ JOINT/BURSA W/O US: CPT | Mod: RT,,, | Performed by: NURSE PRACTITIONER

## 2022-03-07 RX ADMIN — TRIAMCINOLONE ACETONIDE 40 MG: 40 INJECTION, SUSPENSION INTRA-ARTICULAR; INTRAMUSCULAR at 11:03

## 2022-03-07 NOTE — PROGRESS NOTES
CC:  Knee pain    57 y.o. Female returns to clinic for a follow up visit regarding knee pain.       She is here for monovisc injection on today.       Past Medical History:   Diagnosis Date    Arthritis     Breast cancer     Left breast mastectomy 4/16/2020    Cancer     Diabetes mellitus, type 2     Hypertension      Past Surgical History:   Procedure Laterality Date    ARTHROSCOPIC CHONDROPLASTY OF KNEE JOINT Right 9/8/2021    Procedure: ARTHROSCOPY, KNEE, WITH CHONDROPLASTY;  Surgeon: Indra Matos MD;  Location: [facility];  Service: Orthopedics;  Laterality: Right;    BACK SURGERY      CERVICAL BIOPSY  W/ LOOP ELECTRODE EXCISION      HYSTERECTOMY      KNEE ARTHROSCOPY W/ MENISCECTOMY Right 9/8/2021    Procedure: ARTHROSCOPY, KNEE, WITH MENISCECTOMY;  Surgeon: Indra Matos MD;  Location: [facility];  Service: Orthopedics;  Laterality: Right;    KNEE ARTHROSCOPY W/ PLICA EXCISION Right 9/8/2021    Procedure: EXCISION, PLICA, KNEE, ARTHROSCOPIC;  Surgeon: Indra Matos MD;  Location: [facility];  Service: Orthopedics;  Laterality: Right;    MASTECTOMY, PARTIAL Left     TUBAL LIGATION           REVIEW OF SYSTEMS:   Constitution: Negative. Negative for chills, fever and night sweats.    Hematologic/Lymphatic: Negative for bleeding problem. Does not bruise/bleed easily.   Skin: Negative for dry skin, itching and rash.   Musculoskeletal: Negative for falls. Positive for knee pain and muscle weakness.   All other review of symptoms were reviewed and found to be noncontributory.     PHYSICAL EXAMINATION:  There were no vitals taken for this visit.  General    Constitutional: She is oriented to person, place, and time. She appears well-nourished.   HENT:   Head: Normocephalic and atraumatic.   Eyes: Pupils are equal, round, and reactive to light.   Neck: Neck supple.   Cardiovascular: Normal rate and regular rhythm.    Pulmonary/Chest: Effort normal. No respiratory distress.    Abdominal: There is no abdominal tenderness. There is no guarding.   Neurological: She is alert and oriented to person, place, and time. She has normal reflexes.   Psychiatric: She has a normal mood and affect. Her behavior is normal. Judgment and thought content normal.           Right Knee Exam     Inspection   Swelling: present  Effusion: present    Tenderness   The patient is tender to palpation of the medial joint line and lateral joint line.    Crepitus   The patient has crepitus of the patella.    Range of Motion   Extension: normal   Flexion: abnormal     Tests   Meniscus   Eliud:  Medial - positive   Ligament Examination Lachman: normal (-1 to 2mm) PCL-Posterior Drawer: normal (0 to 2mm)     Patella   Patellar Tracking: normal  Patellar Grind: positive    Other   Sensation: normal    Muscle Strength   Right Lower Extremity   Quadriceps:  5/5   Hamstrin/5     Reflexes     Right Side   Achilles:  2+  Quadriceps:  2+    Vascular Exam     Right Pulses  Dorsalis Pedis:      2+  Posterior Tibial:      2+              IMAGING:  No results found.     ASSESSMENT:      ICD-10-CM ICD-9-CM   1. Localized osteoarthritis of right knee  M17.11 715.36       PLAN:     -Findings and treatment options were discussed with the patient  -All questions answered  Natural history and expected course discussed. Questions answered.  Educational materials distributed.  Reduction in offending activity.  OTC analgesics as needed.  Arthrocentesis. See procedure note.  Monovisc injection today  RTC PRN    There are no Patient Instructions on file for this visit.      No orders of the defined types were placed in this encounter.        Large Joint Aspiration/Injection: R patellar bursa    Date/Time: 3/7/2022 11:00 AM  Performed by: WENDY Baez  Authorized by: WENDY Baez     Consent Done?:  Yes (Verbal)  Indications:  Pain  Site marked: the procedure site was marked    Local anesthetic:  Bupivacaine 0.25%  without epinephrine    Details:  Needle Size:  22 G  Location:  Knee  Site:  R patellar bursa  Medications:  40 mg triamcinolone acetonide 40 mg/mL; 4 mL hyaluronate sodium, stabilized 88 mg/4 mL  Patient tolerance:  Patient tolerated the procedure well with no immediate complications

## 2022-03-09 RX ORDER — TRIAMCINOLONE ACETONIDE 40 MG/ML
40 INJECTION, SUSPENSION INTRA-ARTICULAR; INTRAMUSCULAR
Status: DISCONTINUED | OUTPATIENT
Start: 2022-03-07 | End: 2022-03-09 | Stop reason: HOSPADM

## 2022-04-19 ENCOUNTER — OFFICE VISIT (OUTPATIENT)
Dept: ORTHOPEDICS | Facility: CLINIC | Age: 57
End: 2022-04-19
Payer: COMMERCIAL

## 2022-04-19 DIAGNOSIS — Z98.890 S/P ARTHROSCOPY OF KNEE: Primary | ICD-10-CM

## 2022-04-19 PROCEDURE — 99212 OFFICE O/P EST SF 10 MIN: CPT | Mod: ,,, | Performed by: ORTHOPAEDIC SURGERY

## 2022-04-19 PROCEDURE — 99212 PR OFFICE/OUTPT VISIT, EST, LEVL II, 10-19 MIN: ICD-10-PCS | Mod: ,,, | Performed by: ORTHOPAEDIC SURGERY

## 2022-04-21 NOTE — PROGRESS NOTES
CC:  Knee pain    57 y.o. Female returns to clinic for a follow up visit regarding knee pain.    Had a recent monovisc injection and is doing fairly well.        Past Medical History:   Diagnosis Date    Arthritis     Breast cancer     Left breast mastectomy 4/16/2020    Cancer     Diabetes mellitus, type 2     Hypertension      Past Surgical History:   Procedure Laterality Date    ARTHROSCOPIC CHONDROPLASTY OF KNEE JOINT Right 9/8/2021    Procedure: ARTHROSCOPY, KNEE, WITH CHONDROPLASTY;  Surgeon: Indra Matos MD;  Location: [facility];  Service: Orthopedics;  Laterality: Right;    BACK SURGERY      CERVICAL BIOPSY  W/ LOOP ELECTRODE EXCISION      HYSTERECTOMY      KNEE ARTHROSCOPY W/ MENISCECTOMY Right 9/8/2021    Procedure: ARTHROSCOPY, KNEE, WITH MENISCECTOMY;  Surgeon: Indra Matos MD;  Location: [facility];  Service: Orthopedics;  Laterality: Right;    KNEE ARTHROSCOPY W/ PLICA EXCISION Right 9/8/2021    Procedure: EXCISION, PLICA, KNEE, ARTHROSCOPIC;  Surgeon: Indra Matos MD;  Location: [facility];  Service: Orthopedics;  Laterality: Right;    MASTECTOMY, PARTIAL Left     TUBAL LIGATION           REVIEW OF SYSTEMS:   Constitution: Negative. Negative for chills, fever and night sweats.    Hematologic/Lymphatic: Negative for bleeding problem. Does not bruise/bleed easily.   Skin: Negative for dry skin, itching and rash.   Musculoskeletal: Negative for falls. Positive for knee pain and muscle weakness.   All other review of symptoms were reviewed and found to be noncontributory.     PHYSICAL EXAMINATION:  There were no vitals taken for this visit.  Ortho/SPM Exam  Mild medial joint line pain is present in the right knee    IMAGING:  No results found.     ASSESSMENT:      ICD-10-CM ICD-9-CM   1. S/P arthroscopy of knee  Z98.890 V45.89       PLAN:     -Findings and treatment options were discussed with the patient  -All questions answered  Natural history and expected  course discussed. Questions answered.  Educational materials distributed.      There are no Patient Instructions on file for this visit.      No orders of the defined types were placed in this encounter.        Procedures

## 2023-04-21 DIAGNOSIS — M17.11 LOCALIZED OSTEOARTHRITIS OF RIGHT KNEE: Primary | ICD-10-CM

## 2023-04-25 ENCOUNTER — OFFICE VISIT (OUTPATIENT)
Dept: ORTHOPEDICS | Facility: CLINIC | Age: 58
End: 2023-04-25
Payer: MEDICARE

## 2023-04-25 ENCOUNTER — HOSPITAL ENCOUNTER (OUTPATIENT)
Dept: RADIOLOGY | Facility: HOSPITAL | Age: 58
Discharge: HOME | End: 2023-04-25
Attending: ORTHOPAEDIC SURGERY
Payer: MEDICARE

## 2023-04-25 DIAGNOSIS — M17.11 LOCALIZED OSTEOARTHRITIS OF RIGHT KNEE: Primary | ICD-10-CM

## 2023-04-25 DIAGNOSIS — M17.11 LOCALIZED OSTEOARTHRITIS OF RIGHT KNEE: ICD-10-CM

## 2023-04-25 PROCEDURE — 99214 OFFICE O/P EST MOD 30 MIN: CPT | Mod: S$PBB,,, | Performed by: ORTHOPAEDIC SURGERY

## 2023-04-25 PROCEDURE — 73564 XR KNEE COMP 4 OR MORE VIEWS RIGHT: ICD-10-PCS | Mod: 26,RT,, | Performed by: ORTHOPAEDIC SURGERY

## 2023-04-25 PROCEDURE — 73564 X-RAY EXAM KNEE 4 OR MORE: CPT | Mod: 26,RT,, | Performed by: ORTHOPAEDIC SURGERY

## 2023-04-25 PROCEDURE — 73564 X-RAY EXAM KNEE 4 OR MORE: CPT | Mod: TC,RT

## 2023-04-25 PROCEDURE — 99214 PR OFFICE/OUTPT VISIT, EST, LEVL IV, 30-39 MIN: ICD-10-PCS | Mod: S$PBB,,, | Performed by: ORTHOPAEDIC SURGERY

## 2023-04-25 PROCEDURE — 99212 OFFICE O/P EST SF 10 MIN: CPT | Mod: PBBFAC | Performed by: ORTHOPAEDIC SURGERY

## 2023-04-25 NOTE — PROGRESS NOTES
CC:  Knee pain    58 y.o. Female returns to clinic for a follow up visit regarding knee pain.       Patient had a fall last Wednesday and landed on both of her knees and face.   Patient has been having knee pain, but states the pain in her knee is worse since the fall.     Her last steroid injection was in March of last year. She reports that injection relieved her pain for a couple of months.        Past Medical History:   Diagnosis Date    Arthritis     Breast cancer     Left breast mastectomy 4/16/2020    Cancer     Diabetes mellitus, type 2     Hypertension      Past Surgical History:   Procedure Laterality Date    ARTHROSCOPIC CHONDROPLASTY OF KNEE JOINT Right 9/8/2021    Procedure: ARTHROSCOPY, KNEE, WITH CHONDROPLASTY;  Surgeon: Indra Matos MD;  Location: [facility];  Service: Orthopedics;  Laterality: Right;    BACK SURGERY      CERVICAL BIOPSY  W/ LOOP ELECTRODE EXCISION      HYSTERECTOMY      KNEE ARTHROSCOPY W/ MENISCECTOMY Right 9/8/2021    Procedure: ARTHROSCOPY, KNEE, WITH MENISCECTOMY;  Surgeon: Indra Matos MD;  Location: [facility] OR;  Service: Orthopedics;  Laterality: Right;    KNEE ARTHROSCOPY W/ PLICA EXCISION Right 9/8/2021    Procedure: EXCISION, PLICA, KNEE, ARTHROSCOPIC;  Surgeon: Indra Matos MD;  Location: [facility] OR;  Service: Orthopedics;  Laterality: Right;    MASTECTOMY, PARTIAL Left     TUBAL LIGATION           PHYSICAL EXAMINATION:  There were no vitals taken for this visit.  General    Nursing note and vitals reviewed.  Constitutional: She is oriented to person, place, and time. She appears well-developed and well-nourished.   HENT:   Head: Normocephalic and atraumatic.   Nose: Nose normal.   Eyes: Pupils are equal, round, and reactive to light.   Neck: Neck supple.   Cardiovascular:  Normal rate, regular rhythm and intact distal pulses.            Pulmonary/Chest: Effort normal. No respiratory distress. She exhibits no tenderness.   Abdominal: Soft. She  Current Discharge Medication List  
  
Take these medications at their scheduled times Dose & Instructions Dispensing Information Comments Morning Noon Evening Bedtime PRENATAL S 27-0.8 mg Tab tablet Generic drug:  prenatal vit-iron fumarate-fa Your next dose is: Today, Tomorrow Other:  ____________ Dose:  1 Tab Take 1 Tab by mouth daily. Refills:  0 PriLOSEC 10 mg capsule Generic drug:  omeprazole Your next dose is: Today, Tomorrow Other:  ____________ Dose:  10 mg Take 10 mg by mouth daily. Refills:  0 Take these medications as needed Dose & Instructions Dispensing Information Comments Morning Noon Evening Bedtime ZyrTEC 10 mg tablet Generic drug:  cetirizine Your next dose is: Today, Tomorrow Other:  ____________ Take  by mouth two (2) times daily as needed. Refills:  0 Take these medications as directed Dose & Instructions Dispensing Information Comments Morning Noon Evening Bedtime  
 budesonide 32 mcg/actuation nasal spray Commonly known as:  RHINOCORT AQUA Your next dose is: Today, Tomorrow Other:  ____________ Dose:  2 Spray 2 Sprays by Nasal route. Refills:  0 PROAIR HFA 90 mcg/actuation inhaler Generic drug:  albuterol Your next dose is: Today, Tomorrow Other:  ____________ Dose:  2 Puff Take 2 Puffs by inhalation. Refills:  0 exhibits no distension. There is no abdominal tenderness.   Neurological: She is alert and oriented to person, place, and time. She has normal reflexes.   Psychiatric: She has a normal mood and affect. Her behavior is normal. Judgment and thought content normal.     General Musculoskeletal Exam   Gait: antalgic       Right Knee Exam     Inspection   Swelling: present  Effusion: present  Deformity: present    Tenderness   The patient is tender to palpation of the medial joint line.    Crepitus   The patient has crepitus of the patella and medial joint line.    Range of Motion   Extension:  abnormal   Flexion:  abnormal     Tests   Meniscus   Eliud:  Medial - positive   Ligament Examination   Lachman: normal (-1 to 2mm)   PCL-Posterior Drawer: normal (0 to 2mm)     MCL - Valgus: normal (0 to 2mm)  LCL - Varus: normal  Pivot Shift: normal (Equal)  Reverse Pivot Shift: normal (Equal)  Dial Test at 30 degrees: normal (< 5 degrees)  Dial Test at 90 degrees: normal (< 5 degrees)  Posterior Sag Test: negative  Posterolateral Corner: unstable (>15 degrees difference)  Patella   Patellar Tracking: normal  Q-Angle at 90 degrees: normal  Patellar Grind: positive    Other   Sensation: normal    Muscle Strength   Right Lower Extremity   Quadriceps:  5/5   Hamstrin/5     Reflexes     Right Side   Quadriceps:  2+    Vascular Exam     Right Pulses  Dorsalis Pedis:      2+  Posterior Tibial:      2+          IMAGING:  X-Ray Knee Complete 4 Or More Views Right    Result Date: 2023  See Procedure Notes for results. IMPRESSION: Please see Ortho procedure notes for report.  This procedure was auto-finalized by: Virtual Radiologist   Were views right knee were obtained today demonstrating evidence of significant narrowing of the medial joint space with flattening of the medial femoral condyle and evidence of narrowing of the patellofemoral compartment    ASSESSMENT:      ICD-10-CM ICD-9-CM   1. Localized osteoarthritis of  right knee  M17.11 715.36       PLAN:     -Findings and treatment options were discussed with the patient  -All questions answered  Natural history and expected course discussed. Questions answered.  Educational materials distributed.  The conservative options including NSAIDs, activity modification, physical therapy, corticosteroid injection, and viscosupplimentation were discussed. She is interested in surgical intervention at this time.    The surgical process of knee replacement was discussed in detail with the patient including a detailed discussion of the procedure itself (including visual model, x-ray review, and literature review). The typical perioperative and post-operative course was discussed and perioperative risks were discussed to the patient's satisfaction.  Risks and complications discussed included but were not limited to the risks of anesthetic complications, infection, bleeding, wound healing complications, aseptic loosening, instability, limb length inequality, neurologic dysfunction including numbness,  DVT, pulmonary embolism, perioperative medical risks (cardiac, pulmonary, renal, neurologic), and death and the patient elects to proceed. We will initiate pre-operative medical evaluation and clearance and set a provisional date for surgical intervention according to the patient's schedule.   I have discussed anticoagulation with aspirin and coumadin and in low risk patients I have recommended aspirin twice a day.  25 minutes was spent in direct consultation with the patient counselling her on the items listed above.      Will plan for R TKA    There are no Patient Instructions on file for this visit.      No orders of the defined types were placed in this encounter.        Procedures

## 2023-05-17 DIAGNOSIS — M17.11 OSTEOARTHRITIS OF RIGHT KNEE: ICD-10-CM

## 2023-05-17 DIAGNOSIS — M17.11 LOCALIZED OSTEOARTHRITIS OF RIGHT KNEE: Primary | ICD-10-CM

## 2023-05-17 RX ORDER — MUPIROCIN 20 MG/G
OINTMENT TOPICAL
Status: CANCELLED | OUTPATIENT
Start: 2023-05-17

## 2023-05-17 RX ORDER — SODIUM CHLORIDE 9 MG/ML
INJECTION, SOLUTION INTRAVENOUS CONTINUOUS
Status: CANCELLED | OUTPATIENT
Start: 2023-05-17

## 2023-06-20 DIAGNOSIS — Z01.818 PREPROCEDURAL EXAMINATION: Primary | ICD-10-CM

## 2023-06-21 RX ORDER — GABAPENTIN 300 MG/1
300 CAPSULE ORAL 3 TIMES DAILY
COMMUNITY

## 2023-06-23 NOTE — PLAN OF CARE
SW spoke with pt at joint ortho class meeting. Pt lives home alone. Has tiarra and kelly. EC- Kavya Martinez (daughter) [phone]. Choice obtained for [facility]. Pt requests additional referral to be sent to [facility]. Facesheet sending at this time.

## 2023-06-25 ENCOUNTER — ANESTHESIA EVENT (OUTPATIENT)
Dept: SURGERY | Facility: HOSPITAL | Age: 58
End: 2023-06-25
Payer: MEDICARE

## 2023-06-25 NOTE — ANESTHESIA PREPROCEDURE EVALUATION
06/25/2023  Sadie Martinez is a 58 y.o., female.      Pre-op Assessment    I have reviewed the Patient Summary Reports.     I have reviewed the Nursing Notes. I have reviewed the NPO Status.   I have reviewed the Medications.     Review of Systems  Anesthesia Hx:  Denies Family Hx of Anesthesia complications.   Denies Personal Hx of Anesthesia complications.   Social:  Non-Smoker, No Alcohol Use    Hematology/Oncology:  Hematology Normal   Oncology Normal     EENT/Dental:EENT/Dental Normal   Cardiovascular:   Hypertension Denies MI.   hyperlipidemia ECG has been reviewed.    Pulmonary:  Pulmonary Normal    Renal/:  Renal/ Normal     Hepatic/GI:  Hepatic/GI Normal    Musculoskeletal:   Arthritis     Neurological:  Neurology Normal  Denies CVA.    Endocrine:   Diabetes  Obesity / BMI > 30  Dermatological:  Skin Normal    Psych:  Psychiatric Normal           Physical Exam  General: Well nourished, Cooperative, Alert and Oriented    Airway:  Mallampati: II / II  Mouth Opening: Normal  TM Distance: Normal  Neck ROM: Normal ROM    Dental:  Intact    Chest/Lungs:  Clear to auscultation    Heart:  Rate: Normal  Rhythm: Regular Rhythm  Sounds: Normal        Chemistry        Component Value Date/Time     09/03/2021 1009    K 3.4 (L) 09/03/2021 1009     09/03/2021 1009    CO2 32 09/03/2021 1009    BUN 11 09/03/2021 1009    CREATININE 1.26 (H) 09/03/2021 1009     (H) 09/03/2021 1009        Component Value Date/Time    CALCIUM 9.9 09/03/2021 1009    ESTGFRAFRICA 56 (L) 09/03/2021 1009        No results found for: WBC, HGB, HCT, PLT  No results found for this or any previous visit.      Anesthesia Plan  Type of Anesthesia, risks & benefits discussed:    Anesthesia Type: Regional, Gen Supraglottic Airway, Spinal  Intra-op Monitoring Plan: Standard ASA Monitors  Post Op Pain Control Plan: multimodal  analgesia and peripheral nerve block  Induction:  IV  Airway Plan: Direct  Informed Consent: Informed consent signed with the Patient and all parties understand the risks and agree with anesthesia plan.  All questions answered.   ASA Score: 3  Day of Surgery Review of History & Physical: H&P Update referred to the surgeon/provider.I have interviewed and examined the patient. I have reviewed the patient's H&P dated: There are no significant changes.     Ready For Surgery From Anesthesia Perspective.     .

## 2023-06-26 ENCOUNTER — ANESTHESIA (OUTPATIENT)
Dept: SURGERY | Facility: HOSPITAL | Age: 58
End: 2023-06-26
Payer: MEDICARE

## 2023-06-26 ENCOUNTER — HOSPITAL ENCOUNTER (OUTPATIENT)
Facility: HOSPITAL | Age: 58
Discharge: HOME | End: 2023-06-30
Attending: ORTHOPAEDIC SURGERY | Admitting: ORTHOPAEDIC SURGERY
Payer: MEDICARE

## 2023-06-26 DIAGNOSIS — R00.0 TACHYCARDIA: ICD-10-CM

## 2023-06-26 DIAGNOSIS — M17.11 LOCALIZED OSTEOARTHRITIS OF RIGHT KNEE: ICD-10-CM

## 2023-06-26 DIAGNOSIS — M17.11 OSTEOARTHRITIS OF RIGHT KNEE: Primary | ICD-10-CM

## 2023-06-26 DIAGNOSIS — I10 HYPERTENSION: ICD-10-CM

## 2023-06-26 PROBLEM — E11.9 TYPE 2 DIABETES MELLITUS WITHOUT COMPLICATION, WITHOUT LONG-TERM CURRENT USE OF INSULIN: Status: ACTIVE | Noted: 2023-06-26

## 2023-06-26 PROBLEM — Z85.3 HISTORY OF LEFT BREAST CANCER: Status: ACTIVE | Noted: 2023-06-26

## 2023-06-26 LAB
GLUCOSE SERPL-MCNC: 118 MG/DL (ref 70–105)
GLUCOSE SERPL-MCNC: 185 MG/DL (ref 70–105)
GLUCOSE SERPL-MCNC: 200 MG/DL (ref 70–105)
GLUCOSE SERPL-MCNC: 250 MG/DL (ref 70–105)

## 2023-06-26 PROCEDURE — 27201960 HC SPINAL TRAY: Performed by: NURSE ANESTHETIST, CERTIFIED REGISTERED

## 2023-06-26 PROCEDURE — 36000713 HC OR TIME LEV V EA ADD 15 MIN: Performed by: ORTHOPAEDIC SURGERY

## 2023-06-26 PROCEDURE — D9220A PRA ANESTHESIA: Mod: CRNA,,, | Performed by: NURSE ANESTHETIST, CERTIFIED REGISTERED

## 2023-06-26 PROCEDURE — 82962 GLUCOSE BLOOD TEST: CPT | Mod: 91

## 2023-06-26 PROCEDURE — 25000003 PHARM REV CODE 250: Performed by: ORTHOPAEDIC SURGERY

## 2023-06-26 PROCEDURE — 99223 PR INITIAL HOSPITAL CARE,LEVL III: ICD-10-PCS | Mod: ,,, | Performed by: HOSPITALIST

## 2023-06-26 PROCEDURE — 37000008 HC ANESTHESIA 1ST 15 MINUTES: Performed by: ORTHOPAEDIC SURGERY

## 2023-06-26 PROCEDURE — C1713 ANCHOR/SCREW BN/BN,TIS/BN: HCPCS | Performed by: ORTHOPAEDIC SURGERY

## 2023-06-26 PROCEDURE — 71000039 HC RECOVERY, EACH ADD'L HOUR: Performed by: ORTHOPAEDIC SURGERY

## 2023-06-26 PROCEDURE — 71000033 HC RECOVERY, INTIAL HOUR: Performed by: ORTHOPAEDIC SURGERY

## 2023-06-26 PROCEDURE — 63600175 PHARM REV CODE 636 W HCPCS: Performed by: ANESTHESIOLOGY

## 2023-06-26 PROCEDURE — D9220A PRA ANESTHESIA: ICD-10-PCS | Mod: ANES,,, | Performed by: ANESTHESIOLOGY

## 2023-06-26 PROCEDURE — 27000655: Performed by: NURSE ANESTHETIST, CERTIFIED REGISTERED

## 2023-06-26 PROCEDURE — 99223 1ST HOSP IP/OBS HIGH 75: CPT | Mod: ,,, | Performed by: HOSPITALIST

## 2023-06-26 PROCEDURE — 63600175 PHARM REV CODE 636 W HCPCS: Performed by: NURSE ANESTHETIST, CERTIFIED REGISTERED

## 2023-06-26 PROCEDURE — 63600175 PHARM REV CODE 636 W HCPCS: Performed by: ORTHOPAEDIC SURGERY

## 2023-06-26 PROCEDURE — C1776 JOINT DEVICE (IMPLANTABLE): HCPCS | Performed by: ORTHOPAEDIC SURGERY

## 2023-06-26 PROCEDURE — 97166 OT EVAL MOD COMPLEX 45 MIN: CPT

## 2023-06-26 PROCEDURE — D9220A PRA ANESTHESIA: ICD-10-PCS | Mod: CRNA,,, | Performed by: NURSE ANESTHETIST, CERTIFIED REGISTERED

## 2023-06-26 PROCEDURE — 27000510 HC BLANKET BAIR HUGGER ANY SIZE: Performed by: NURSE ANESTHETIST, CERTIFIED REGISTERED

## 2023-06-26 PROCEDURE — 97161 PT EVAL LOW COMPLEX 20 MIN: CPT

## 2023-06-26 PROCEDURE — 27000177 HC AIRWAY, LARYNGEAL MASK: Performed by: NURSE ANESTHETIST, CERTIFIED REGISTERED

## 2023-06-26 PROCEDURE — 27000716 HC OXISENSOR PROBE, ANY SIZE: Performed by: NURSE ANESTHETIST, CERTIFIED REGISTERED

## 2023-06-26 PROCEDURE — 25000003 PHARM REV CODE 250: Performed by: NURSE ANESTHETIST, CERTIFIED REGISTERED

## 2023-06-26 PROCEDURE — 27200750 HC INSULATED NEEDLE/ STIMUPLEX: Performed by: NURSE ANESTHETIST, CERTIFIED REGISTERED

## 2023-06-26 PROCEDURE — D9220A PRA ANESTHESIA: Mod: ANES,,, | Performed by: ANESTHESIOLOGY

## 2023-06-26 PROCEDURE — 37000009 HC ANESTHESIA EA ADD 15 MINS: Performed by: ORTHOPAEDIC SURGERY

## 2023-06-26 PROCEDURE — 36000712 HC OR TIME LEV V 1ST 15 MIN: Performed by: ORTHOPAEDIC SURGERY

## 2023-06-26 PROCEDURE — 27201423 OPTIME MED/SURG SUP & DEVICES STERILE SUPPLY: Performed by: ORTHOPAEDIC SURGERY

## 2023-06-26 PROCEDURE — 27447 TOTAL KNEE ARTHROPLASTY: CPT | Mod: RT,,, | Performed by: ORTHOPAEDIC SURGERY

## 2023-06-26 PROCEDURE — 0055T PR COMPUTER-ASSIST MUSCSKEL NAVIG, ORTHO PROC, CT/MRI: ICD-10-PCS | Mod: ,,, | Performed by: ORTHOPAEDIC SURGERY

## 2023-06-26 PROCEDURE — 27447 PR TOTAL KNEE ARTHROPLASTY: ICD-10-PCS | Mod: RT,,, | Performed by: ORTHOPAEDIC SURGERY

## 2023-06-26 PROCEDURE — 0055T BONE SRGRY CMPTR CT/MRI IMAG: CPT | Mod: ,,, | Performed by: ORTHOPAEDIC SURGERY

## 2023-06-26 DEVICE — ATTUNE PATELLA MEDIALIZED DOME 35MM CEMENTED AOX
Type: IMPLANTABLE DEVICE | Site: KNEE | Status: FUNCTIONAL
Brand: ATTUNE

## 2023-06-26 DEVICE — CEMENT BONE SURG SMPLX P RADPQ: Type: IMPLANTABLE DEVICE | Site: KNEE | Status: FUNCTIONAL

## 2023-06-26 DEVICE — IMPLANTABLE DEVICE: Type: IMPLANTABLE DEVICE | Site: KNEE | Status: FUNCTIONAL

## 2023-06-26 RX ORDER — ACETAMINOPHEN 500 MG
1000 TABLET ORAL EVERY 8 HOURS
Status: COMPLETED | OUTPATIENT
Start: 2023-06-26 | End: 2023-06-27

## 2023-06-26 RX ORDER — AMLODIPINE BESYLATE 5 MG/1
5 TABLET ORAL DAILY
Status: DISCONTINUED | OUTPATIENT
Start: 2023-06-26 | End: 2023-06-30 | Stop reason: HOSPADM

## 2023-06-26 RX ORDER — LOPERAMIDE HYDROCHLORIDE 2 MG/1
4 CAPSULE ORAL ONCE
Status: COMPLETED | OUTPATIENT
Start: 2023-06-26 | End: 2023-06-26

## 2023-06-26 RX ORDER — GLIPIZIDE 5 MG/1
10 TABLET ORAL
Status: DISCONTINUED | OUTPATIENT
Start: 2023-06-27 | End: 2023-06-27

## 2023-06-26 RX ORDER — LIDOCAINE HYDROCHLORIDE 20 MG/ML
INJECTION, SOLUTION EPIDURAL; INFILTRATION; INTRACAUDAL; PERINEURAL
Status: DISCONTINUED | OUTPATIENT
Start: 2023-06-26 | End: 2023-06-26

## 2023-06-26 RX ORDER — MUPIROCIN 20 MG/G
1 OINTMENT TOPICAL 2 TIMES DAILY
Status: DISCONTINUED | OUTPATIENT
Start: 2023-06-26 | End: 2023-06-30 | Stop reason: HOSPADM

## 2023-06-26 RX ORDER — MEPERIDINE HYDROCHLORIDE 25 MG/ML
25 INJECTION INTRAMUSCULAR; INTRAVENOUS; SUBCUTANEOUS EVERY 10 MIN PRN
Status: DISCONTINUED | OUTPATIENT
Start: 2023-06-26 | End: 2023-06-26 | Stop reason: HOSPADM

## 2023-06-26 RX ORDER — POLYETHYLENE GLYCOL 3350 17 G/17G
17 POWDER, FOR SOLUTION ORAL DAILY
Status: DISCONTINUED | OUTPATIENT
Start: 2023-06-26 | End: 2023-06-30

## 2023-06-26 RX ORDER — PREGABALIN 75 MG/1
75 CAPSULE ORAL 2 TIMES DAILY
Status: DISCONTINUED | OUTPATIENT
Start: 2023-06-26 | End: 2023-06-30 | Stop reason: HOSPADM

## 2023-06-26 RX ORDER — SODIUM CHLORIDE 0.9 % (FLUSH) 0.9 %
3 SYRINGE (ML) INJECTION EVERY 6 HOURS PRN
Status: DISCONTINUED | OUTPATIENT
Start: 2023-06-26 | End: 2023-06-30 | Stop reason: HOSPADM

## 2023-06-26 RX ORDER — ATORVASTATIN CALCIUM 40 MG/1
40 TABLET, FILM COATED ORAL DAILY
Status: DISCONTINUED | OUTPATIENT
Start: 2023-06-26 | End: 2023-06-30 | Stop reason: HOSPADM

## 2023-06-26 RX ORDER — DEXAMETHASONE SODIUM PHOSPHATE 4 MG/ML
INJECTION, SOLUTION INTRA-ARTICULAR; INTRALESIONAL; INTRAMUSCULAR; INTRAVENOUS; SOFT TISSUE
Status: DISCONTINUED | OUTPATIENT
Start: 2023-06-26 | End: 2023-06-26

## 2023-06-26 RX ORDER — DEXTROSE 40 %
30 GEL (GRAM) ORAL
Status: DISCONTINUED | OUTPATIENT
Start: 2023-06-26 | End: 2023-06-30 | Stop reason: HOSPADM

## 2023-06-26 RX ORDER — ANASTROZOLE 1 MG/1
1 TABLET ORAL DAILY
Status: DISCONTINUED | OUTPATIENT
Start: 2023-06-26 | End: 2023-06-26

## 2023-06-26 RX ORDER — CEFAZOLIN SODIUM 1 G/3ML
INJECTION, POWDER, FOR SOLUTION INTRAMUSCULAR; INTRAVENOUS
Status: DISCONTINUED | OUTPATIENT
Start: 2023-06-26 | End: 2023-06-26

## 2023-06-26 RX ORDER — CALCIUM CARBONATE 200(500)MG
500 TABLET,CHEWABLE ORAL DAILY PRN
Status: DISCONTINUED | OUTPATIENT
Start: 2023-06-26 | End: 2023-06-30 | Stop reason: HOSPADM

## 2023-06-26 RX ORDER — DOCUSATE SODIUM 100 MG/1
100 CAPSULE, LIQUID FILLED ORAL EVERY 12 HOURS
Status: DISCONTINUED | OUTPATIENT
Start: 2023-06-26 | End: 2023-06-28

## 2023-06-26 RX ORDER — ONDANSETRON 2 MG/ML
INJECTION INTRAMUSCULAR; INTRAVENOUS
Status: DISCONTINUED | OUTPATIENT
Start: 2023-06-26 | End: 2023-06-26

## 2023-06-26 RX ORDER — ONDANSETRON 4 MG/1
8 TABLET, ORALLY DISINTEGRATING ORAL EVERY 8 HOURS PRN
Status: DISCONTINUED | OUTPATIENT
Start: 2023-06-26 | End: 2023-06-30 | Stop reason: HOSPADM

## 2023-06-26 RX ORDER — ROPIVACAINE/EPI/CLONIDINE/KET 2.46-0.005
SYRINGE (ML) INJECTION
Status: DISCONTINUED | OUTPATIENT
Start: 2023-06-26 | End: 2023-06-26 | Stop reason: HOSPADM

## 2023-06-26 RX ORDER — HYDROMORPHONE HYDROCHLORIDE 2 MG/ML
0.5 INJECTION, SOLUTION INTRAMUSCULAR; INTRAVENOUS; SUBCUTANEOUS EVERY 5 MIN PRN
Status: DISCONTINUED | OUTPATIENT
Start: 2023-06-26 | End: 2023-06-26 | Stop reason: HOSPADM

## 2023-06-26 RX ORDER — HYDROCHLOROTHIAZIDE 25 MG/1
25 TABLET ORAL DAILY
Status: DISCONTINUED | OUTPATIENT
Start: 2023-06-26 | End: 2023-06-30 | Stop reason: HOSPADM

## 2023-06-26 RX ORDER — ACETAMINOPHEN 10 MG/ML
1000 INJECTION, SOLUTION INTRAVENOUS ONCE
Status: COMPLETED | OUTPATIENT
Start: 2023-06-26 | End: 2023-06-26

## 2023-06-26 RX ORDER — INSULIN ASPART 100 [IU]/ML
1-10 INJECTION, SOLUTION INTRAVENOUS; SUBCUTANEOUS
Status: DISCONTINUED | OUTPATIENT
Start: 2023-06-26 | End: 2023-06-30 | Stop reason: HOSPADM

## 2023-06-26 RX ORDER — ONDANSETRON 2 MG/ML
4 INJECTION INTRAMUSCULAR; INTRAVENOUS DAILY PRN
Status: DISCONTINUED | OUTPATIENT
Start: 2023-06-26 | End: 2023-06-26 | Stop reason: HOSPADM

## 2023-06-26 RX ORDER — BISACODYL 10 MG
10 SUPPOSITORY, RECTAL RECTAL DAILY PRN
Status: DISCONTINUED | OUTPATIENT
Start: 2023-06-26 | End: 2023-06-30 | Stop reason: HOSPADM

## 2023-06-26 RX ORDER — DEXTROSE 40 %
15 GEL (GRAM) ORAL
Status: DISCONTINUED | OUTPATIENT
Start: 2023-06-26 | End: 2023-06-30 | Stop reason: HOSPADM

## 2023-06-26 RX ORDER — NAPROXEN SODIUM 220 MG/1
325 TABLET, FILM COATED ORAL DAILY
Status: DISCONTINUED | OUTPATIENT
Start: 2023-06-27 | End: 2023-06-30 | Stop reason: HOSPADM

## 2023-06-26 RX ORDER — OXYCODONE HYDROCHLORIDE 5 MG/1
10 TABLET ORAL EVERY 4 HOURS PRN
Status: DISCONTINUED | OUTPATIENT
Start: 2023-06-26 | End: 2023-06-30

## 2023-06-26 RX ORDER — GLUCAGON 1 MG
1 KIT INJECTION
Status: DISCONTINUED | OUTPATIENT
Start: 2023-06-26 | End: 2023-06-30 | Stop reason: HOSPADM

## 2023-06-26 RX ORDER — SODIUM CHLORIDE 9 MG/ML
INJECTION, SOLUTION INTRAVENOUS CONTINUOUS
Status: DISCONTINUED | OUTPATIENT
Start: 2023-06-26 | End: 2023-06-26

## 2023-06-26 RX ORDER — MIDAZOLAM HYDROCHLORIDE 1 MG/ML
INJECTION INTRAMUSCULAR; INTRAVENOUS
Status: DISCONTINUED | OUTPATIENT
Start: 2023-06-26 | End: 2023-06-26

## 2023-06-26 RX ORDER — PROPOFOL 10 MG/ML
VIAL (ML) INTRAVENOUS
Status: DISCONTINUED | OUTPATIENT
Start: 2023-06-26 | End: 2023-06-26

## 2023-06-26 RX ORDER — FAMOTIDINE 20 MG/1
20 TABLET, FILM COATED ORAL 2 TIMES DAILY
Status: DISCONTINUED | OUTPATIENT
Start: 2023-06-26 | End: 2023-06-30 | Stop reason: HOSPADM

## 2023-06-26 RX ORDER — METFORMIN HYDROCHLORIDE 500 MG/1
1000 TABLET ORAL 2 TIMES DAILY WITH MEALS
Status: DISCONTINUED | OUTPATIENT
Start: 2023-06-26 | End: 2023-06-27

## 2023-06-26 RX ORDER — TRANEXAMIC ACID 10 MG/ML
1000 INJECTION, SOLUTION INTRAVENOUS
Status: DISCONTINUED | OUTPATIENT
Start: 2023-06-26 | End: 2023-06-26 | Stop reason: HOSPADM

## 2023-06-26 RX ORDER — MORPHINE SULFATE 10 MG/ML
4 INJECTION INTRAMUSCULAR; INTRAVENOUS; SUBCUTANEOUS EVERY 5 MIN PRN
Status: DISCONTINUED | OUTPATIENT
Start: 2023-06-26 | End: 2023-06-26 | Stop reason: HOSPADM

## 2023-06-26 RX ORDER — TRANEXAMIC ACID 10 MG/ML
1000 INJECTION, SOLUTION INTRAVENOUS
Status: COMPLETED | OUTPATIENT
Start: 2023-06-26 | End: 2023-06-26

## 2023-06-26 RX ORDER — OXYCODONE HYDROCHLORIDE 5 MG/1
5 TABLET ORAL EVERY 4 HOURS PRN
Status: DISCONTINUED | OUTPATIENT
Start: 2023-06-26 | End: 2023-06-30 | Stop reason: HOSPADM

## 2023-06-26 RX ORDER — DIPHENHYDRAMINE HYDROCHLORIDE 50 MG/ML
25 INJECTION INTRAMUSCULAR; INTRAVENOUS EVERY 6 HOURS PRN
Status: DISCONTINUED | OUTPATIENT
Start: 2023-06-26 | End: 2023-06-26 | Stop reason: HOSPADM

## 2023-06-26 RX ORDER — ZOLPIDEM TARTRATE 5 MG/1
5 TABLET ORAL NIGHTLY PRN
Status: DISCONTINUED | OUTPATIENT
Start: 2023-06-26 | End: 2023-06-30 | Stop reason: HOSPADM

## 2023-06-26 RX ORDER — MUPIROCIN 20 MG/G
OINTMENT TOPICAL
Status: DISCONTINUED | OUTPATIENT
Start: 2023-06-26 | End: 2023-06-26 | Stop reason: HOSPADM

## 2023-06-26 RX ADMIN — TRANEXAMIC ACID 1000 MG: 10 INJECTION, SOLUTION INTRAVENOUS at 09:06

## 2023-06-26 RX ADMIN — LIDOCAINE HYDROCHLORIDE 20 MG: 20 INJECTION, SOLUTION EPIDURAL; INFILTRATION; INTRACAUDAL; PERINEURAL at 09:06

## 2023-06-26 RX ADMIN — METFORMIN HYDROCHLORIDE 1000 MG: 500 TABLET ORAL at 05:06

## 2023-06-26 RX ADMIN — PROPOFOL 40 MG: 10 INJECTION, EMULSION INTRAVENOUS at 09:06

## 2023-06-26 RX ADMIN — ANASTROZOLE 1 MG: 1 TABLET, COATED ORAL at 05:06

## 2023-06-26 RX ADMIN — AMLODIPINE BESYLATE 5 MG: 5 TABLET ORAL at 02:06

## 2023-06-26 RX ADMIN — OXYCODONE HYDROCHLORIDE 10 MG: 5 TABLET ORAL at 03:06

## 2023-06-26 RX ADMIN — DEXAMETHASONE SODIUM PHOSPHATE 4 MG: 4 INJECTION, SOLUTION INTRA-ARTICULAR; INTRALESIONAL; INTRAMUSCULAR; INTRAVENOUS; SOFT TISSUE at 09:06

## 2023-06-26 RX ADMIN — PROPOFOL 100 MG: 10 INJECTION, EMULSION INTRAVENOUS at 09:06

## 2023-06-26 RX ADMIN — FAMOTIDINE 20 MG: 20 TABLET, FILM COATED ORAL at 08:06

## 2023-06-26 RX ADMIN — ATORVASTATIN CALCIUM 40 MG: 40 TABLET, FILM COATED ORAL at 02:06

## 2023-06-26 RX ADMIN — DOCUSATE SODIUM 100 MG: 100 CAPSULE, LIQUID FILLED ORAL at 08:06

## 2023-06-26 RX ADMIN — PREGABALIN 75 MG: 75 CAPSULE ORAL at 08:06

## 2023-06-26 RX ADMIN — ACETAMINOPHEN 1000 MG: 500 TABLET ORAL at 10:06

## 2023-06-26 RX ADMIN — POLYETHYLENE GLYCOL 3350 17 G: 17 POWDER, FOR SOLUTION ORAL at 02:06

## 2023-06-26 RX ADMIN — SODIUM CHLORIDE: 9 INJECTION, SOLUTION INTRAVENOUS at 08:06

## 2023-06-26 RX ADMIN — ROPIVACAINE HYDROCHLORIDE 30 ML: 7.5 INJECTION, SOLUTION EPIDURAL; PERINEURAL at 09:06

## 2023-06-26 RX ADMIN — HYDROCHLOROTHIAZIDE 25 MG: 25 TABLET ORAL at 02:06

## 2023-06-26 RX ADMIN — MIDAZOLAM HYDROCHLORIDE 2 MG: 1 INJECTION, SOLUTION INTRAMUSCULAR; INTRAVENOUS at 09:06

## 2023-06-26 RX ADMIN — ACETAMINOPHEN 1000 MG: 10 INJECTION, SOLUTION INTRAVENOUS at 03:06

## 2023-06-26 RX ADMIN — ONDANSETRON 4 MG: 2 INJECTION INTRAMUSCULAR; INTRAVENOUS at 09:06

## 2023-06-26 RX ADMIN — OXYCODONE HYDROCHLORIDE 10 MG: 5 TABLET ORAL at 08:06

## 2023-06-26 RX ADMIN — TRANEXAMIC ACID 1000 MG: 10 INJECTION, SOLUTION INTRAVENOUS at 11:06

## 2023-06-26 RX ADMIN — VANCOMYCIN HYDROCHLORIDE 1500 MG: 1 INJECTION, POWDER, LYOPHILIZED, FOR SOLUTION INTRAVENOUS at 09:06

## 2023-06-26 RX ADMIN — CEFAZOLIN 2 G: 2 INJECTION, POWDER, FOR SOLUTION INTRAMUSCULAR; INTRAVENOUS at 05:06

## 2023-06-26 RX ADMIN — CEFAZOLIN 2 G: 1 INJECTION, POWDER, FOR SOLUTION INTRAMUSCULAR; INTRAVENOUS; PARENTERAL at 09:06

## 2023-06-26 RX ADMIN — MUPIROCIN 1 G: 20 OINTMENT TOPICAL at 08:06

## 2023-06-26 RX ADMIN — LOPERAMIDE HYDROCHLORIDE 4 MG: 2 CAPSULE ORAL at 02:06

## 2023-06-26 NOTE — PLAN OF CARE
Problem: Occupational Therapy  Goal: Occupational Therapy Goal  Description: STG:  Pt will perform grooming with setup  Pt will bathe with min a  Pt will perform UE dressing with (I)   Pt will perform LE dressing with I/Mod I  Pt will transfer bed/chair/bsc with (S)  Pt will perform standing task x 2 min with (S)   Pt will tolerate 15 minutes of tx without fatigue      LT.Restore to max I with self care and mobility.    Outcome: Ongoing, Progressing

## 2023-06-26 NOTE — OP NOTE
Department of Orthopedic Surgery    Operative Note  NAME: Sadie Martinez    : 1965      DATE: 2023      PREOPERATIVE DIAGNOSIS: Localized osteoarthritis of right knee [M17.11]    POSTOPERATIVE DIAGNOSIS:  Localized osteoarthritis of right knee [M17.11]    PROCEDURE: right Total Knee Arthroplasty    SURGEON: Indra Matos    ASSISTANT: Alberto Johnston    ANESTHESIA: Spinal + adductor canal block    BLOOD LOSS: 30 cc    TOURNIQUET TIME:  57 mins    DRAINS: None    IMPLANTS:   Implant Name Type Inv. Item Serial No.  Lot No. LRB No. Used Action   SYS KNEE EPAK PIN ATTUNE - [id number]  SYS KNEE EPAK PIN ATTUNE  [employer] [id number] Right 1 Implanted and Explanted   PIN THREADED STERILE - [id number]  PIN THREADED STERILE  [employer] [id number] Right 1 Implanted and Explanted   CEMENT BONE SURG SMPLX P RADPQ - [id number]  CEMENT BONE SURG SMPLX P RADPQ  [employer] BAR[employer]. [id number] Right 2 Implanted   PATELLA ATTUNE MEDLZD DOME 35 - [id number]  PATELLA ATTUNE MEDLZD DOME 35  [employer] [id number] Right 1 Implanted   attune femoral porocoat cruciate retaining size 4 right cementless    [employer] [id number] Right 1 Implanted   ATTUNE TIBIAL BASE POROCOAT ROTATING PLATFORM SIZE 4 CEMENTLESS    [employer] [id number] Right 1 Implanted   ATTUNE TIBIAL INSERT ROTATING PLATFORM CRUCIATE RETAINING SIZE 4 7MM AOX    [employer] [id number] Right 1 Implanted       Attune UnCemented Tibial Baseplate, Cruciate Retaining, Size 4  Attune UnCemented Femoral Component, Size 4  Attune Rotating Platform, Cruciate retaining 7 polyethylene spacer   Attune Size 35 Medialized dome patella button     INDICATIONS FOR PROCEDURE:   [unfilled] is a 58 y.o.-year-old with debilitating right-sided knee pain despite nonoperative measures and interested in knee arthroplasty.    PROCEDURE IN DETAIL:  After obtaining informed consent and starting the patient on preoperative IV antibiotics, the patient was taken back to the Operating   Room. Anesthesia was  performed by Anesthesia Team. The right lower  extremity was prepped and draped in normal sterile fashion.  An Esmarch bandage was used to exsanguinate the affected lower extremity and the pneumatic tourniquet  was inflated to 300 mmHg.  A standard longitudinal midline incision was made beginning 3 fingerbreadths above the superior pole of the patella extending down to the tibial tubercle.  Full-thickness skin flaps were raised.  A medial parapatellar arthrotomy was made. The patella was then everted.    A release of the proximal medial tibia and MCL was then undertaken.  The infrapatella fat pad was resected.  An intramedullary guide was then used to perform the distal femur cut.  With the guide set at the appropriate degree of valgus alignment, the guide was inserted and the appropriate cut was made.   We then used our size 4, 4-in-1 cutting block to make our anterior, posterior, and anterior and posterior chamfer cuts. The tibial cut was addressed next.  Computer navigation was used in this case to verify the correct tibial alignment.  2 guidepins were placed percutaneously in the tibia, and waypoints were inserted into the [employer] software.  An extramedullary tibial guide was also used to verify corrected alignment.  The proximal tibia was then cut. The tibia was then drilled and punched.  Trial components were placed on the femur and tibia.   Flexion and extension gaps were symmetric and balanced at this point.    The trial components demonstrated proper range of motion and stability.    The patella was addressed next.  A guide was utilized to resect 9.5 mm of bone.  A drill guide was applied to flat patella surface and appropriate drill holes were made.  A trial patella button was placed.  The patella tracked nicely.  No lateral release was required.  We then Pulsavac irrigated cancellous bone and vacuum mixed the cement.      The final components were then implanted.  The tibial component was placed first,  followed by the femoral component.    The cemented patella was then placed and excess cement was removed.   Once the cement hardened,the tourniquet was released.  Bleeders were coagulated.  1 gram of TXA was given at this point. The appropriate dose of pericapsular injection was then administered, focusing on the posterior capsule.  Final polyethylene component was placed.  Satisfactory range of motion and stability was demonstrated.    The extensor retinaculum was then clsoed with #1 Vicryl figure of eight sutures with the knee in slight flexion, the subcutaneous tissue with 2-0 Vicryl, skin reapproximated with staples.  A sterile dressing was applied, as was a knee immobilizer.  Patient was then taken to the recovery room in stable condition.

## 2023-06-26 NOTE — PT/OT/SLP EVAL
Physical Therapy Evaluation     Patient Name: Sadie Martinez   MRN: [id number]  Recent Surgery: Procedure(s) (LRB):  ARTHROPLASTY, KNEE, TOTAL, USING COMPUTER-ASSISTED NAVIGATION (Right) Day of Surgery    Recommendations:     Discharge Recommendations: rehabilitation facility, home health PT   Discharge Equipment Recommendations: none   Barriers to discharge: Ongoing medical treatment    Assessment:     Sadie Martinez is a 58 y.o. female admitted with a medical diagnosis of Localized osteoarthritis of right knee. She presents with the following impairments/functional limitations: weakness, impaired endurance, impaired functional mobility, gait instability, decreased lower extremity function, pain, decreased ROM, orthopedic precautions. Pt prefers swingbed versus rehab at d/c d/t living alone with limited support.     Rehab Prognosis: Good; patient would benefit from acute PT services to address these deficits and reach maximum level of function.    Plan:     During this hospitalization, patient to be seen BID (5x/wk; daily 2x/wk) to address the above listed problems via gait training, therapeutic activities, therapeutic exercises    Plan of Care Expires: 07/26/23    Subjective     Chief Complaint: R TKA  Patient Comments/Goals: agreeable to PT  Pain/Comfort:  Pain Rating 1: 8/10  Location - Side 1: Right  Location 1: knee  Pain Addressed 1: Pre-medicate for activity, Distraction  Pain Rating Post-Intervention 1: 8/10    Social History:  Living Environment: Patient lives alone in a single story home with number of outside stair(s): 3 with rail  Prior Level of Function: Prior to admission, patient was independent and driving and disabled  Equipment Used at Home: cane, straight  DME owned (not currently used): rolling walker and shower chair  Assistance Upon Discharge: facility staff    Objective:     Communicated with DAJA Painter LPN prior to session. Patient found HOB elevated with blood pressure cuff, pulse ox  (continuous), peripheral IV, knee immobilizer, SCD upon PT entry to room.    General Precautions: Standard, fall   Orthopedic Precautions: RLE weight bearing as tolerated   Braces: Knee immobilizer    Respiratory Status: Room air    Exams:  RLE ROM: WFL except knee immobilized  RLE Strength: Deficits: 3-/5 hip, knee n/t, ankle WFL  LLE ROM: WNL  LLE Strength: WNL  Cognitive: Patient is oriented to Person, Place, Time, Situation  Sensation:    -       Intact    Functional Mobility:  Gait belt applied - Yes  Bed Mobility  Supine to Sit: minimum assistance for LE management  Sit to Supine: contact guard assistance for maintenance of precautions  Transfers  Sit to Stand: contact guard assistance with rolling walker and with cues for hand placement, foot placement, and weight bearing precautions  Gait  Patient ambulated 40ft with rolling walker and contact guard assistance. Patient required cues for position in walker, sequencing, step through gait pattern, stride length, and weight bearing status to increase independence and safety. Patient required cues ~ 25% of the time.  Balance  Sitting: GOOD+: Maintains balance through MAXIMAL excursions of active trunk motion  Standing: FAIR: Needs CONTACT GUARD during gait  Stairs: unable    Therapeutic Activities and Exercises:  Patient educated on role of acute care PT and PT POC, safety while in hospital including calling nurse for mobility, and call light usage.  Educated about weightbearing precautions and provided cuing for adherence as appropriate during session.  Educated about importance of OOB mobility and remaining up in chair most of the day.  Education:   Importance of keeping surgical knee straight when in chair or in bed. Pillow/towel roll should not be behind the knee but may be placed under calf or heel in order to further assist with passive knee extension.   Weight bearing as tolerated right lower extremity  Purpose and operation of cryocuff machine. Discussed  necessity of pillowcase or thin towel being placed between skin and ice pack.   TKR protocol exercises to perform BID x 30 reps. Handout provided.   Rolling walker should be used for safe ambulation until progressed by HHPT or Outpatient PT.   Mobility during hospital stay with staff assistance for safety  Purpose and schedule for CPM beginning post-op day one.     AM-PAC 6 CLICK MOBILITY  Total Score:18    Patient left HOB elevated with all lines intact, call button in reach, and family present.    GOALS:   Multidisciplinary Problems       Physical Therapy Goals          Problem: Physical Therapy    Goal Priority Disciplines Outcome Goal Variances Interventions   Physical Therapy Goal     PT, PT/OT Ongoing, Progressing     Description: Short Term Goals to be met by: 23    Patient will increase functional independence with mobility by performin. Supine to sit with Modified [place]  2. Sit to stand transfer with Modified [place]  3. Bed to chair transfer with Modified [place] using Rolling Walker, WBAT R LE  4. Gait  x 100 feet with Modified [place] using Rolling Walker, WBAT R LE.   5. Lower extremity exercise program x30 reps per handout, with assistance as needed  6. Knee ROM 0-90  7. Negotiate 3 steps with 1 rail with CGA    Long Term Goals to be met by: 23    Pt will regain full independent functional mobility to return to prior activities of daily living.                        History:     Past Medical History:   Diagnosis Date    Arthritis     Breast cancer     Left breast mastectomy 2020    Cancer     Diabetes mellitus, type 2     Hypertension        Past Surgical History:   Procedure Laterality Date    ARTHROSCOPIC CHONDROPLASTY OF KNEE JOINT Right 2021    Procedure: ARTHROSCOPY, KNEE, WITH CHONDROPLASTY;  Surgeon: Indra Matos MD;  Location: [facility];  Service: Orthopedics;  Laterality: Right;    BACK SURGERY      CERVICAL BIOPSY  W/ LOOP ELECTRODE  EXCISION      HYSTERECTOMY      KNEE ARTHROSCOPY W/ MENISCECTOMY Right 9/8/2021    Procedure: ARTHROSCOPY, KNEE, WITH MENISCECTOMY;  Surgeon: Indra Matos MD;  Location: [facility];  Service: Orthopedics;  Laterality: Right;    KNEE ARTHROSCOPY W/ PLICA EXCISION Right 9/8/2021    Procedure: EXCISION, PLICA, KNEE, ARTHROSCOPIC;  Surgeon: Indra Matos MD;  Location: [facility];  Service: Orthopedics;  Laterality: Right;    MASTECTOMY, PARTIAL Left     TUBAL LIGATION         Time Tracking:     PT Received On: 06/26/23  PT Start Time: 1622  PT Stop Time: 1638  PT Total Time (min): 16 min     Billable Minutes: Evaluation low complexity    6/26/2023

## 2023-06-26 NOTE — ASSESSMENT & PLAN NOTE
Patient's FSGs are controlled on current medication regimen.  Last A1c reviewed- No results found for: LABA1C, HGBA1C  Most recent fingerstick glucose reviewed- No results for input(s): POCTGLUCOSE in the last 24 hours.  Current correctional scale  Medium  Maintain anti-hyperglycemic dose as follows-   Antihyperglycemics (From admission, onward)    Start     Stop Route Frequency Ordered    06/27/23 0745  glipiZIDE tablet 10 mg         -- Oral With breakfast 06/26/23 1431    06/26/23 1715  metFORMIN tablet 1,000 mg         -- Oral 2 times daily with meals 06/26/23 1431        Hold Oral hypoglycemics while patient is in the hospital.

## 2023-06-26 NOTE — SUBJECTIVE & OBJECTIVE
Past Medical History:   Diagnosis Date    Arthritis     Breast cancer     Left breast mastectomy 4/16/2020    Cancer     Diabetes mellitus, type 2     Hypertension        Past Surgical History:   Procedure Laterality Date    ARTHROSCOPIC CHONDROPLASTY OF KNEE JOINT Right 9/8/2021    Procedure: ARTHROSCOPY, KNEE, WITH CHONDROPLASTY;  Surgeon: Indra Matos MD;  Location: [facility];  Service: Orthopedics;  Laterality: Right;    BACK SURGERY      CERVICAL BIOPSY  W/ LOOP ELECTRODE EXCISION      HYSTERECTOMY      KNEE ARTHROSCOPY W/ MENISCECTOMY Right 9/8/2021    Procedure: ARTHROSCOPY, KNEE, WITH MENISCECTOMY;  Surgeon: Indra Matos MD;  Location: [facility] OR;  Service: Orthopedics;  Laterality: Right;    KNEE ARTHROSCOPY W/ PLICA EXCISION Right 9/8/2021    Procedure: EXCISION, PLICA, KNEE, ARTHROSCOPIC;  Surgeon: Indra Matos MD;  Location: [facility];  Service: Orthopedics;  Laterality: Right;    MASTECTOMY, PARTIAL Left     TUBAL LIGATION         Review of patient's allergies indicates:   Allergen Reactions    Mobic [meloxicam] Swelling       No current facility-administered medications on file prior to encounter.     Current Outpatient Medications on File Prior to Encounter   Medication Sig    amLODIPine (NORVASC) 5 MG tablet Take 5 mg by mouth once daily.    anastrozole (ARIMIDEX) 1 mg Tab Take 1 mg by mouth once daily.    calcium carbonate (OS-WILLIAM) 600 mg calcium (1,500 mg) Tab Take 600 mg by mouth once daily.    cyclobenzaprine (FLEXERIL) 10 MG tablet TAKE 1 TABLET BY MOUTH AT BEDTIME AS NEEDED FOR MUSCLE PAIN    gabapentin (NEURONTIN) 300 MG capsule Take 300 mg by mouth 3 (three) times daily.    glipiZIDE (GLUCOTROL) 10 MG tablet TAKE 1 TABLET BY MOUTH EVERY DAY BEFORE A MEAL    hydroCHLOROthiazide (HYDRODIURIL) 25 MG tablet Take 25 mg by mouth once daily.    HYDROcodone-acetaminophen (NORCO) 5-325 mg per tablet Take 1 tablet by mouth every 6 (six) hours as needed for Pain. (Patient  taking differently: Take 2 tablets by mouth every 6 (six) hours as needed for Pain.)    metFORMIN (GLUCOPHAGE) 1000 MG tablet Take 1,000 mg by mouth 2 (two) times daily with meals.    rosuvastatin (CRESTOR) 10 MG tablet Take 10 mg by mouth once daily.    HYDROcodone-acetaminophen (NORCO) 7.5-325 mg per tablet Take 1 tablet by mouth 3 (three) times daily.    ondansetron (ZOFRAN-ODT) 4 MG TbDL Take 1 tablet (4 mg total) by mouth every 6 (six) hours as needed (nausea).    [DISCONTINUED] SITagliptin (JANUVIA) 100 MG Tab Take 100 mg by mouth once daily.     Family History       Problem Relation (Age of Onset)    Dementia Father    Diabetes Mother    Heart attack Mother    Hypertension Mother    Pancreatic cancer Maternal Uncle    Stomach cancer Maternal Uncle    Stroke Father          Tobacco Use    Smoking status: Never    Smokeless tobacco: Never   Substance and Sexual Activity    Alcohol use: Not Currently    Drug use: Never    Sexual activity: Not Currently     Partners: Male     Review of Systems   Constitutional:  Negative for appetite change, fatigue and fever.   HENT:  Negative for congestion, hearing loss and trouble swallowing.         Wears glasses   Respiratory:  Negative for chest tightness, shortness of breath and wheezing.    Cardiovascular:  Negative for chest pain and palpitations.   Gastrointestinal:  Negative for abdominal pain, constipation and nausea.   Genitourinary:  Negative for difficulty urinating and dysuria.   Musculoskeletal:  Positive for arthralgias and back pain. Negative for neck stiffness.   Skin:  Negative for pallor and rash.   Neurological:  Negative for dizziness, speech difficulty and headaches.   Psychiatric/Behavioral:  Negative for confusion and suicidal ideas.    Objective:     Vital Signs (Most Recent):  Temp: 98.1 °F (36.7 °C) (06/26/23 1415)  Pulse: 101 (06/26/23 1415)  Resp: 19 (06/26/23 1500)  BP: (!) 148/71 (06/26/23 1415)  SpO2: 100 % (06/26/23 1415) Vital Signs (24h  Range):  Temp:  [97.8 °F (36.6 °C)-98.1 °F (36.7 °C)] 98.1 °F (36.7 °C)  Pulse:  [] 101  Resp:  [12-26] 19  SpO2:  [97 %-100 %] 100 %  BP: (117-148)/(54-85) 148/71     Weight: 74.4 kg (164 lb)  Body mass index is 30 kg/m².     Physical Exam  Vitals reviewed.   Constitutional:       General: She is awake. She is not in acute distress.     Appearance: She is well-developed. She is not toxic-appearing.   HENT:      Head: Normocephalic.      Nose: Nose normal.      Mouth/Throat:      Pharynx: Oropharynx is clear.   Eyes:      Extraocular Movements: Extraocular movements intact.      Pupils: Pupils are equal, round, and reactive to light.   Neck:      Thyroid: No thyroid mass.      Vascular: No carotid bruit.   Cardiovascular:      Rate and Rhythm: Normal rate and regular rhythm.      Pulses: Normal pulses.      Heart sounds: Normal heart sounds. No murmur heard.  Pulmonary:      Effort: Pulmonary effort is normal.      Breath sounds: Normal breath sounds and air entry. No wheezing.   Abdominal:      General: Bowel sounds are normal. There is no distension.      Palpations: Abdomen is soft.      Tenderness: There is no abdominal tenderness.   Musculoskeletal:      Cervical back: Neck supple. No rigidity.      Comments: Changes consistent with right knee surgery this admission   Skin:     General: Skin is warm.      Coloration: Skin is not jaundiced.      Findings: No lesion.   Neurological:      General: No focal deficit present.      Mental Status: She is alert and oriented to person, place, and time.      Cranial Nerves: No cranial nerve deficit.   Psychiatric:         Attention and Perception: Attention normal.         Mood and Affect: Mood normal.         Behavior: Behavior normal. Behavior is cooperative.         Thought Content: Thought content normal.         Cognition and Memory: Cognition normal.        Significant Labs: All pertinent labs within the past 24 hours have been reviewed.  BMP: No results for  input(s): GLU, NA, K, CL, CO2, BUN, CREATININE, CALCIUM, MG in the last 48 hours.  CBC: No results for input(s): WBC, HGB, HCT, PLT in the last 48 hours.  CMP: No results for input(s): NA, K, CL, CO2, GLU, BUN, CREATININE, CALCIUM, PROT, ALBUMIN, BILITOT, ALKPHOS, AST, ALT, ANIONGAP, EGFRNONAA in the last 48 hours.    Invalid input(s): ESTGFAFRICA    Significant Imaging: I have reviewed all pertinent imaging results/findings within the past 24 hours.      Intake/Output - Last 3 Shifts         06/24 0700  06/25 0659 06/25 0700 06/26 0659 06/26 0700 06/27 0659    I.V. (mL/kg)   200 (2.7)    IV Piggyback   250    Total Intake(mL/kg)   450 (6)    Net   +450           Urine Occurrence   1 x          Microbiology Results (last 7 days)       ** No results found for the last 168 hours. **

## 2023-06-26 NOTE — PLAN OF CARE
Problem: Physical Therapy  Goal: Physical Therapy Goal  Description: Short Term Goals to be met by: 23    Patient will increase functional independence with mobility by performin. Supine to sit with Modified [place]  2. Sit to stand transfer with Modified [place]  3. Bed to chair transfer with Modified [place] using Rolling Walker, WBAT R LE  4. Gait  x 100 feet with Modified [place] using Rolling Walker, WBAT R LE.   5. Lower extremity exercise program x30 reps per handout, with assistance as needed  6. Knee ROM 0-90  7. Negotiate 3 steps with 1 rail with CGA    Long Term Goals to be met by: 23    Pt will regain full independent functional mobility to return to prior activities of daily living.   Outcome: Ongoing, Progressing     PT eval completed. Please see eval note for details on current mobility status.

## 2023-06-26 NOTE — H&P
H&P completed  has been reviewed, the patient has been examined and:  I concur with the findings and no changes have occurred since H&P was written.      To OR for Rt TKA    There are no hospital problems to display for this patient.             58 y.o. Female returns to clinic for a follow up visit regarding knee pain.        Patient had a fall last Wednesday and landed on both of her knees and face.   Patient has been having knee pain, but states the pain in her knee is worse since the fall.      Her last steroid injection was in March of last year. She reports that injection relieved her pain for a couple of months.           Past Medical History:   Diagnosis Date    Arthritis      Breast cancer       Left breast mastectomy 4/16/2020    Cancer      Diabetes mellitus, type 2      Hypertension              Past Surgical History:   Procedure Laterality Date    ARTHROSCOPIC CHONDROPLASTY OF KNEE JOINT Right 9/8/2021     Procedure: ARTHROSCOPY, KNEE, WITH CHONDROPLASTY;  Surgeon: Indra Matos MD;  Location: [facility];  Service: Orthopedics;  Laterality: Right;    BACK SURGERY        CERVICAL BIOPSY  W/ LOOP ELECTRODE EXCISION        HYSTERECTOMY        KNEE ARTHROSCOPY W/ MENISCECTOMY Right 9/8/2021     Procedure: ARTHROSCOPY, KNEE, WITH MENISCECTOMY;  Surgeon: Indra Matos MD;  Location: [facility];  Service: Orthopedics;  Laterality: Right;    KNEE ARTHROSCOPY W/ PLICA EXCISION Right 9/8/2021     Procedure: EXCISION, PLICA, KNEE, ARTHROSCOPIC;  Surgeon: Indra Matos MD;  Location: [facility];  Service: Orthopedics;  Laterality: Right;    MASTECTOMY, PARTIAL Left      TUBAL LIGATION                PHYSICAL EXAMINATION:  There were no vitals taken for this visit.  General     Nursing note and vitals reviewed.  Constitutional: She is oriented to person, place, and time. She appears well-developed and well-nourished.   HENT:   Head: Normocephalic and atraumatic.   Nose: Nose normal.   Eyes:  Pupils are equal, round, and reactive to light.   Neck: Neck supple.   Cardiovascular:  Normal rate, regular rhythm and intact distal pulses.            Pulmonary/Chest: Effort normal. No respiratory distress. She exhibits no tenderness.   Abdominal: Soft. She exhibits no distension. There is no abdominal tenderness.   Neurological: She is alert and oriented to person, place, and time. She has normal reflexes.   Psychiatric: She has a normal mood and affect. Her behavior is normal. Judgment and thought content normal.      General Musculoskeletal Exam   Gait: antalgic         Right Knee Exam      Inspection   Swelling: present  Effusion: present  Deformity: present     Tenderness   The patient is tender to palpation of the medial joint line.     Crepitus   The patient has crepitus of the patella and medial joint line.     Range of Motion   Extension:  abnormal   Flexion:  abnormal      Tests   Meniscus   Eliud:  Medial - positive   Ligament Examination   Lachman: normal (-1 to 2mm)   PCL-Posterior Drawer: normal (0 to 2mm)     MCL - Valgus: normal (0 to 2mm)  LCL - Varus: normal  Pivot Shift: normal (Equal)  Reverse Pivot Shift: normal (Equal)  Dial Test at 30 degrees: normal (< 5 degrees)  Dial Test at 90 degrees: normal (< 5 degrees)  Posterior Sag Test: negative  Posterolateral Corner: unstable (>15 degrees difference)  Patella   Patellar Tracking: normal  Q-Angle at 90 degrees: normal  Patellar Grind: positive     Other   Sensation: normal     Muscle Strength   Right Lower Extremity   Quadriceps:  5/5   Hamstrin/5      Reflexes      Right Side   Quadriceps:  2+     Vascular Exam      Right Pulses  Dorsalis Pedis:      2+  Posterior Tibial:      2+              IMAGING:  X-Ray Knee Complete 4 Or More Views Right     Result Date: 2023  See Procedure Notes for results. IMPRESSION: Please see Ortho procedure notes for report.  This procedure was auto-finalized by: Virtual Radiologist   Were views right  knee were obtained today demonstrating evidence of significant narrowing of the medial joint space with flattening of the medial femoral condyle and evidence of narrowing of the patellofemoral compartment     ASSESSMENT:        ICD-10-CM ICD-9-CM   1. Localized osteoarthritis of right knee  M17.11 715.36         PLAN:     -Findings and treatment options were discussed with the patient  -All questions answered  Natural history and expected course discussed. Questions answered.  Educational materials distributed.  The conservative options including NSAIDs, activity modification, physical therapy, corticosteroid injection, and viscosupplimentation were discussed. She is interested in surgical intervention at this time.     The surgical process of knee replacement was discussed in detail with the patient including a detailed discussion of the procedure itself (including visual model, x-ray review, and literature review). The typical perioperative and post-operative course was discussed and perioperative risks were discussed to the patient's satisfaction.  Risks and complications discussed included but were not limited to the risks of anesthetic complications, infection, bleeding, wound healing complications, aseptic loosening, instability, limb length inequality, neurologic dysfunction including numbness,  DVT, pulmonary embolism, perioperative medical risks (cardiac, pulmonary, renal, neurologic), and death and the patient elects to proceed. We will initiate pre-operative medical evaluation and clearance and set a provisional date for surgical intervention according to the patient's schedule.   I have discussed anticoagulation with aspirin and coumadin and in low risk patients I have recommended aspirin twice a day.  25 minutes was spent in direct consultation with the patient counselling her on the items listed above.        Will plan for R TKA

## 2023-06-26 NOTE — PLAN OF CARE
[facility]  Initial Discharge Assessment       Primary Care Provider: Indra Matos MD    Admission Diagnosis: Localized osteoarthritis of right knee [M17.11]  Osteoarthritis of right knee [M17.11]    Admission Date: 6/26/2023  Expected Discharge Date:     Transition of Care Barriers: None    Payor: HUMANA MANAGED MEDICARE / Plan: HUMANA MEDICARE PPO / Product Type: Medicare Advantage /     Extended Emergency Contact Information  Primary Emergency Contact: Kavya Martinez  Mobile Phone: [phone]  Relation: Daughter   needed? No    Discharge Plan A: Rehab  Discharge Plan B: Home Health, Home with family      [id number] - [place], [place] - [address]  [address]  [place] [address]  Phone: [phone] Fax: [phone]    [id number] - [facility] [place] - [address]  [address]  [place] [address]  Phone: [phone] Fax: [phone]    [facility], [place] - [address]  [address]  [place] [address]  Phone: [phone] Fax: [phone]      Initial Assessment (most recent)       Adult Discharge Assessment - 06/26/23 1146          Discharge Assessment    Assessment Type Discharge Planning Assessment     Source of Information family     If unable to respond/provide information was family/caregiver contacted? Yes     Contact Name/Number Kavya Martinez (daughter) [phone]     People in Home alone     Do you expect to return to your current living situation? Other (see comments)   inpatient rehab vs hh    Do you have help at home or someone to help you manage your care at home? Yes     Who are your caregiver(s) and their phone number(s)? Kavya Martinez (daughter) [phone]     Prior to hospitilization cognitive status: Unable to Assess     Current cognitive status: Unable to Assess     Home Accessibility not wheelchair accessible      Home Layout Able to live on 1st floor     Equipment Currently Used at Home walker, rolling;cane, straight     Readmission within 30 days? No     Patient currently being followed by outpatient case management? No     Do you currently have service(s) that help you manage your care at home? No     Do you take prescription medications? Yes     Do you have prescription coverage? Yes     Do you have any problems affording any of your prescribed medications? No     Who is going to help you get home at discharge? Kavya Martinez (daughter) [phone]     How do you get to doctors appointments? family or friend will provide;car, drives self     Are you on dialysis? No     Do you take coumadin? No     Discharge Plan A Rehab     Discharge Plan B Home Health;Home with family     DME Needed Upon Discharge  none     Discharge Plan discussed with: Adult children;Patient     Transition of Care Barriers None        Physical Activity    On average, how many days per week do you engage in moderate to strenuous exercise (like a brisk walk)? 0 days     On average, how many minutes do you engage in exercise at this level? 0 min        Financial Resource Strain    How hard is it for you to pay for the very basics like food, housing, medical care, and heating? Not hard at all        Housing Stability    In the last 12 months, was there a time when you were not able to pay the mortgage or rent on time? No     In the last 12 months, how many places have you lived? 1     In the last 12 months, was there a time when you did not have a steady place to sleep or slept in a shelter (including now)? No        Transportation Needs    In the past 12 months, has lack of transportation kept you from medical appointments or from getting medications? No     In the past 12 months, has lack of transportation kept you from meetings, work, or from getting things needed for daily living? No        Food Insecurity    Within the past 12 months, you worried  that your food would run out before you got the money to buy more. Never true     Within the past 12 months, the food you bought just didn't last and you didn't have money to get more. Never true        Stress    Do you feel stress - tense, restless, nervous, or anxious, or unable to sleep at night because your mind is troubled all the time - these days? Not at all        Social Connections    In a typical week, how many times do you talk on the phone with family, friends, or neighbors? More than three times a week     How often do you get together with friends or relatives? More than three times a week     How often do you attend Sabianism or Alevism services? More than 4 times per year     Do you belong to any clubs or organizations such as Sabianism groups, unions, fraternal or athletic groups, or school groups? No     How often do you attend meetings of the clubs or organizations you belong to? Never     Are you , , , , never , or living with a partner? Never         Alcohol Use    Q1: How often do you have a drink containing alcohol? 2-4 times a month     Q2: How many drinks containing alcohol do you have on a typical day when you are drinking? 1 or 2     Q3: How often do you have six or more drinks on one occasion? Never                   Sw went to bedside to speak with pt and complete dcp initial assessment. Pt in OR at time of assessment. Kavya Martinez (daughter) [phone] present. Pta, pt lived home alone. Has rw and cane(s). Pt requesting referral to be sent to [facility] inpatient rehab. Referral sent. Updated clinicals to follow. Choice obtained for [facility] if patient is unable to receive [facility] precert for inpatient rehab upon dc. Ss following for further dc needs and recommendations.

## 2023-06-26 NOTE — CONSULTS
[facility]  [facility] Medicine  Consult Note    Patient Name: Sadie Martinez  MRN: [id number]  Admission Date: 6/26/2023  Hospital Length of Stay: 0 days  Attending Physician: Indra Matos MD   Primary Care Provider: Indra Matos MD           Patient information was obtained from patient, past medical records and ER records.     Consults  Subjective:     Principal Problem: Localized osteoarthritis of right knee    Chief Complaint: No chief complaint on file.       HPI: Thank you for consult:    Chief complaint:  Right knee pain.    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  Patient states she injured her right knee at work in 2017.  States has had discomfort with it since.  More than a year ago she had a meniscus tear in right knee requiring a knee scope.  Has had continued pain leading to her knee surgery.  Denies any significant pain to her left knee.     Review of system:  See above.  Wears glasses without recent change in vision.  States also has some chronic back issues which she states initiated at her work.  Denies any snoring and states sleep issues seemed to come from her back and chronic knee pain.  Review of systems otherwise.    Past medical history:  -hypertension greater than 15 years.  -diabetes mellitus type 2 treated with oral agents  -diagnosed 2020 with breast cancer status post left mastectomy.  No chemotherapy or radiation needed.  Followed by Dr. Warner.    Past surgical history:  Right knee scope  Right total knee replacement this admission  Previous left mastectomy in 2020  Hysterectomy with bilateral salpingo-oophorectomy  Low back surgery    Patient list Mobic as an allergy    Social history:  Lives by herself  Has children that live in the area that could possibly help her as needed  Would like to go to [facility] bed following this admission  No history of tobacco use  Drink alcohol only  occasionally    Family history:  Mother had heart problems with heart attack starting her 60s    Health maintenance issues:  Primary care provider is Melanie Watts NP  Had flu shot last fall  No prior Pneumovax but recommended she get wanted age 63  Had mammogram in April  Last Pap smear 3 years earlier  Scheduled for colonoscopy next year, states prior colonoscopy showed an area they were concerned about infection and they found no polyps but told her to repeat it next year          Past Medical History:   Diagnosis Date    Arthritis     Breast cancer     Left breast mastectomy 4/16/2020    Cancer     Diabetes mellitus, type 2     Hypertension        Past Surgical History:   Procedure Laterality Date    ARTHROSCOPIC CHONDROPLASTY OF KNEE JOINT Right 9/8/2021    Procedure: ARTHROSCOPY, KNEE, WITH CHONDROPLASTY;  Surgeon: Indra Matos MD;  Location: [facility] OR;  Service: Orthopedics;  Laterality: Right;    BACK SURGERY      CERVICAL BIOPSY  W/ LOOP ELECTRODE EXCISION      HYSTERECTOMY      KNEE ARTHROSCOPY W/ MENISCECTOMY Right 9/8/2021    Procedure: ARTHROSCOPY, KNEE, WITH MENISCECTOMY;  Surgeon: Indra Matos MD;  Location: [facility] OR;  Service: Orthopedics;  Laterality: Right;    KNEE ARTHROSCOPY W/ PLICA EXCISION Right 9/8/2021    Procedure: EXCISION, PLICA, KNEE, ARTHROSCOPIC;  Surgeon: Indra Matos MD;  Location: [facility] OR;  Service: Orthopedics;  Laterality: Right;    MASTECTOMY, PARTIAL Left     TUBAL LIGATION         Review of patient's allergies indicates:   Allergen Reactions    Mobic [meloxicam] Swelling       No current facility-administered medications on file prior to encounter.     Current Outpatient Medications on File Prior to Encounter   Medication Sig    amLODIPine (NORVASC) 5 MG tablet Take 5 mg by mouth once daily.    anastrozole (ARIMIDEX) 1 mg Tab Take 1 mg by mouth once daily.    calcium carbonate (OS-WILLIAM) 600 mg calcium (1,500 mg) Tab Take 600 mg by  mouth once daily.    cyclobenzaprine (FLEXERIL) 10 MG tablet TAKE 1 TABLET BY MOUTH AT BEDTIME AS NEEDED FOR MUSCLE PAIN    gabapentin (NEURONTIN) 300 MG capsule Take 300 mg by mouth 3 (three) times daily.    glipiZIDE (GLUCOTROL) 10 MG tablet TAKE 1 TABLET BY MOUTH EVERY DAY BEFORE A MEAL    hydroCHLOROthiazide (HYDRODIURIL) 25 MG tablet Take 25 mg by mouth once daily.    HYDROcodone-acetaminophen (NORCO) 5-325 mg per tablet Take 1 tablet by mouth every 6 (six) hours as needed for Pain. (Patient taking differently: Take 2 tablets by mouth every 6 (six) hours as needed for Pain.)    metFORMIN (GLUCOPHAGE) 1000 MG tablet Take 1,000 mg by mouth 2 (two) times daily with meals.    rosuvastatin (CRESTOR) 10 MG tablet Take 10 mg by mouth once daily.    HYDROcodone-acetaminophen (NORCO) 7.5-325 mg per tablet Take 1 tablet by mouth 3 (three) times daily.    ondansetron (ZOFRAN-ODT) 4 MG TbDL Take 1 tablet (4 mg total) by mouth every 6 (six) hours as needed (nausea).    [DISCONTINUED] SITagliptin (JANUVIA) 100 MG Tab Take 100 mg by mouth once daily.     Family History       Problem Relation (Age of Onset)    Dementia Father    Diabetes Mother    Heart attack Mother    Hypertension Mother    Pancreatic cancer Maternal Uncle    Stomach cancer Maternal Uncle    Stroke Father          Tobacco Use    Smoking status: Never    Smokeless tobacco: Never   Substance and Sexual Activity    Alcohol use: Not Currently    Drug use: Never    Sexual activity: Not Currently     Partners: Male     Review of Systems   Constitutional:  Negative for appetite change, fatigue and fever.   HENT:  Negative for congestion, hearing loss and trouble swallowing.         Wears glasses   Respiratory:  Negative for chest tightness, shortness of breath and wheezing.    Cardiovascular:  Negative for chest pain and palpitations.   Gastrointestinal:  Negative for abdominal pain, constipation and nausea.   Genitourinary:  Negative for difficulty  urinating and dysuria.   Musculoskeletal:  Positive for arthralgias and back pain. Negative for neck stiffness.   Skin:  Negative for pallor and rash.   Neurological:  Negative for dizziness, speech difficulty and headaches.   Psychiatric/Behavioral:  Negative for confusion and suicidal ideas.    Objective:     Vital Signs (Most Recent):  Temp: 98.1 °F (36.7 °C) (06/26/23 1415)  Pulse: 101 (06/26/23 1415)  Resp: 19 (06/26/23 1500)  BP: (!) 148/71 (06/26/23 1415)  SpO2: 100 % (06/26/23 1415) Vital Signs (24h Range):  Temp:  [97.8 °F (36.6 °C)-98.1 °F (36.7 °C)] 98.1 °F (36.7 °C)  Pulse:  [] 101  Resp:  [12-26] 19  SpO2:  [97 %-100 %] 100 %  BP: (117-148)/(54-85) 148/71     Weight: 74.4 kg (164 lb)  Body mass index is 30 kg/m².     Physical Exam  Vitals reviewed.   Constitutional:       General: She is awake. She is not in acute distress.     Appearance: She is well-developed. She is not toxic-appearing.   HENT:      Head: Normocephalic.      Nose: Nose normal.      Mouth/Throat:      Pharynx: Oropharynx is clear.   Eyes:      Extraocular Movements: Extraocular movements intact.      Pupils: Pupils are equal, round, and reactive to light.   Neck:      Thyroid: No thyroid mass.      Vascular: No carotid bruit.   Cardiovascular:      Rate and Rhythm: Normal rate and regular rhythm.      Pulses: Normal pulses.      Heart sounds: Normal heart sounds. No murmur heard.  Pulmonary:      Effort: Pulmonary effort is normal.      Breath sounds: Normal breath sounds and air entry. No wheezing.   Abdominal:      General: Bowel sounds are normal. There is no distension.      Palpations: Abdomen is soft.      Tenderness: There is no abdominal tenderness.   Musculoskeletal:      Cervical back: Neck supple. No rigidity.      Comments: Changes consistent with right knee surgery this admission   Skin:     General: Skin is warm.      Coloration: Skin is not jaundiced.      Findings: No lesion.   Neurological:      General: No  focal deficit present.      Mental Status: She is alert and oriented to person, place, and time.      Cranial Nerves: No cranial nerve deficit.   Psychiatric:         Attention and Perception: Attention normal.         Mood and Affect: Mood normal.         Behavior: Behavior normal. Behavior is cooperative.         Thought Content: Thought content normal.         Cognition and Memory: Cognition normal.        Significant Labs: All pertinent labs within the past 24 hours have been reviewed.  BMP: No results for input(s): GLU, NA, K, CL, CO2, BUN, CREATININE, CALCIUM, MG in the last 48 hours.  CBC: No results for input(s): WBC, HGB, HCT, PLT in the last 48 hours.  CMP: No results for input(s): NA, K, CL, CO2, GLU, BUN, CREATININE, CALCIUM, PROT, ALBUMIN, BILITOT, ALKPHOS, AST, ALT, ANIONGAP, EGFRNONAA in the last 48 hours.    Invalid input(s): ESTGFAFRICA    Significant Imaging: I have reviewed all pertinent imaging results/findings within the past 24 hours.      Intake/Output - Last 3 Shifts         06/24 0700 06/25 0659 06/25 0700 06/26 0659 06/26 0700 06/27 0659    I.V. (mL/kg)   200 (2.7)    IV Piggyback   250    Total Intake(mL/kg)   450 (6)    Net   +450           Urine Occurrence   1 x          Microbiology Results (last 7 days)       ** No results found for the last 168 hours. **              Assessment/Plan:     * Localized osteoarthritis of right knee    Knee replacement surgery 06/26    History of left breast cancer    Status post mastectomy in 2020  No radiation or chemotherapy required  Followed by Dr. Warner    Type 2 diabetes mellitus without complication, without long-term current use of insulin  Patient's FSGs are controlled on current medication regimen.  Last A1c reviewed- No results found for: LABA1C, HGBA1C  Most recent fingerstick glucose reviewed- No results for input(s): POCTGLUCOSE in the last 24 hours.  Current correctional scale  Medium  Maintain anti-hyperglycemic dose as follows-    Antihyperglycemics (From admission, onward)    Start     Stop Route Frequency Ordered    06/27/23 0745  glipiZIDE tablet 10 mg         -- Oral With breakfast 06/26/23 1431 06/26/23 1715  metFORMIN tablet 1,000 mg         -- Oral 2 times daily with meals 06/26/23 1431        Hold Oral hypoglycemics while patient is in the hospital.    Systolic hypertension  Continue with home blood pressure medication      VTE Risk Mitigation (From admission, onward)         Ordered     IP VTE LOW RISK PATIENT  Once         06/26/23 1431     Place SONI hose  Until discontinued         06/26/23 1431     Place sequential compression device  Until discontinued         06/26/23 1431                    Thank you for your consult. I will follow-up with patient. Please contact us if you have any additional questions.    Aly Cardenas MD  Department of Hospital Medicine   [facility]

## 2023-06-26 NOTE — ANESTHESIA PROCEDURE NOTES
Intubation    Date/Time: 6/26/2023 9:29 AM  Performed by: Devyn Hylton CRNA  Authorized by: Kaushal Jackson MD     Intubation:     Induction:  Intravenous    Intubated:  Postinduction    Mask Ventilation:  Easy mask    Attempts:  1    Attempted By:  CRNA    Difficult Airway Encountered?: No      Complications:  None    Airway Device:  Supraglottic airway/LMA    Airway Device Size:  3.0    Style/Cuff Inflation:  Cuffed (inflated to minimal occlusive pressure)    Placement Verified By:  Capnometry    Complicating Factors:  None    Findings Post-Intubation:  BS equal bilateral and atraumatic/condition of teeth unchanged

## 2023-06-26 NOTE — PT/OT/SLP EVAL
Occupational Therapy   Evaluation    Name: Sadie Martinez  MRN: [id number]  Admitting Diagnosis: Localized osteoarthritis of right knee  Recent Surgery: Procedure(s) (LRB):  ARTHROPLASTY, KNEE, TOTAL, USING COMPUTER-ASSISTED NAVIGATION (Right) Day of Surgery    Recommendations:     Discharge Recommendations:    Discharge Equipment Recommendations:   (to be determined)  Barriers to discharge:  None    Assessment:     Sadie Martinez is a 58 y.o. female with a medical diagnosis of Localized osteoarthritis of right knee.  She presents with complaint of (R) knee pain. Performance deficits affecting function: impaired endurance, impaired self care skills, impaired functional mobility, gait instability, impaired balance, pain.      Rehab Prognosis: Good; patient would benefit from acute skilled OT services to address these deficits and reach maximum level of function.       Plan:     Patient to be seen 5 x/week to address the above listed problems via self-care/home management, therapeutic activities, therapeutic exercises  Plan of Care Expires:    Plan of Care Reviewed with: patient, daughter    Subjective     Chief Complaint: (R) LE pain  Patient/Family Comments/goals: Swingbed prior top d/c home    Occupational Profile:  Living Environment: Pt lives alone in 1 story home with 3 steps and 1 rail  Previous level of function: I with self care prior  Roles and Routines: pt is disabled. I with daily activities  Equipment Used at Home: walker, rolling, cane, straight  Assistance upon Discharge: family    Pain/Comfort:  Pain Rating 1: 8/10  Location - Side 1: Right  Location 1: knee  Pain Addressed 1: Pre-medicate for activity, Reposition, Distraction  Pain Rating Post-Intervention 1: 8/10    Patients cultural, spiritual, Protestant conflicts given the current situation: no    Objective:     Communicated with: SHAUNA Painter prior to session.  Patient found HOB elevated with blood pressure cuff, peripheral IV, telemetry, SCD,  pulse ox (continuous) upon OT entry to room.    General Precautions: Standard, fall  Orthopedic Precautions: RLE weight bearing as tolerated  Braces: Knee immobilizer  Respiratory Status: Room air    Occupational Performance:    Bed Mobility:    Patient completed Supine to Sit with minimum assistance and with (R) LE management  Patient completed Sit to Supine with stand by assistance    Functional Mobility/Transfers:  Patient completed Sit <> Stand Transfer with contact guard assistance  with  gait belt to stand to RW   Functional Mobility: CGA with RW and gait belt    Activities of Daily Living:  Upper Body Dressing: minimum assistance zenaida goel    Cognitive/Visual Perceptual:  Cognitive/Psychosocial Skills:     -       Oriented to: Person, Place, and Situation   -       Follows Commands/attention:Follows one-step commands  -       Communication: clear/fluent  Visual/Perceptual:      -Intact wears glasses. Hearing WFL    Physical Exam:  Balance:    -       (S) with EOB sitting, CGA with mobility  Skin integrity: Visible skin intact  Edema:  None noted  Sensation:    -       Intact  Motor Planning:    -       wfl  Dominant hand:    -       Right  Upper Extremity Range of Motion:     -       Right Upper Extremity: WFL  -       Left Upper Extremity: WFL  Upper Extremity Strength:    -       Right Upper Extremity: WFL  -       Left Upper Extremity: WFL   Strength:    -       Right Upper Extremity: WFL  -       Left Upper Extremity: WFL    AMPAC 6 Click ADL:  AMPAC Total Score: 18    Treatment & Education:  OT evaluation completed. See eval for detail.   Pt educated on OT role/POC.   Importance of OOB activity with staff assistance.  Importance of sitting up in the chair throughout the day as tolerated, especially for meals   Safety during functional t/f and mobility with use of RW  Importance of assisting with self-care activities   All questions/concerns answered within OT scope of practice      Patient left HOB elevated with all lines intact, call button in reach, and daughter present    GOALS:   Multidisciplinary Problems       Occupational Therapy Goals          Problem: Occupational Therapy    Goal Priority Disciplines Outcome Interventions   Occupational Therapy Goal     OT, PT/OT Ongoing, Progressing    Description: STG:  Pt will perform grooming with setup  Pt will bathe with min a  Pt will perform UE dressing with (I)   Pt will perform LE dressing with I/Mod I  Pt will transfer bed/chair/bsc with (S)  Pt will perform standing task x 2 min with (S)   Pt will tolerate 15 minutes of tx without fatigue      LT.Restore to max I with self care and mobility.                         History:     Past Medical History:   Diagnosis Date    Arthritis     Breast cancer     Left breast mastectomy 2020    Cancer     Diabetes mellitus, type 2     Hypertension          Past Surgical History:   Procedure Laterality Date    ARTHROSCOPIC CHONDROPLASTY OF KNEE JOINT Right 2021    Procedure: ARTHROSCOPY, KNEE, WITH CHONDROPLASTY;  Surgeon: Indra Matos MD;  Location: [facility];  Service: Orthopedics;  Laterality: Right;    BACK SURGERY      CERVICAL BIOPSY  W/ LOOP ELECTRODE EXCISION      HYSTERECTOMY      KNEE ARTHROSCOPY W/ MENISCECTOMY Right 2021    Procedure: ARTHROSCOPY, KNEE, WITH MENISCECTOMY;  Surgeon: Indra Matos MD;  Location: [facility];  Service: Orthopedics;  Laterality: Right;    KNEE ARTHROSCOPY W/ PLICA EXCISION Right 2021    Procedure: EXCISION, PLICA, KNEE, ARTHROSCOPIC;  Surgeon: Indra Matos MD;  Location: [facility];  Service: Orthopedics;  Laterality: Right;    MASTECTOMY, PARTIAL Left     TUBAL LIGATION         Time Tracking:     OT Date of Treatment: 23  OT Start Time:   OT Stop Time: 163  OT Total Time (min): 15 min    Billable Minutes:Evaluation 15    2023

## 2023-06-26 NOTE — TRANSFER OF CARE
"Anesthesia Transfer of Care Note    Patient: Sadie Martinez    Procedure(s) Performed: Procedure(s) (LRB):  ARTHROPLASTY, KNEE, TOTAL, USING COMPUTER-ASSISTED NAVIGATION (Right)    Patient location: PACU    Anesthesia Type: CSE    Transport from OR: Transported from OR on 100% O2 by closed face mask with adequate spontaneous ventilation    Post pain: adequate analgesia    Post assessment: no apparent anesthetic complications    Post vital signs: stable    Level of consciousness: responds to stimulation    Nausea/Vomiting: no nausea/vomiting    Complications: none    Transfer of care protocol was followed      Last vitals:   Visit Vitals  /64   Pulse (!) 113   Temp 36.6 °C (97.8 °F) (Oral)   Resp (!) 26   Ht 5' 2" (1.575 m)   Wt 74.4 kg (164 lb)   SpO2 99%   Breastfeeding No   BMI 30.00 kg/m²     "

## 2023-06-26 NOTE — DISCHARGE INSTRUCTIONS
ANKLE: Pumps        Point toes down, then up. Repeat 30 times, 2 sessions per day        Quad Set        With other leg bent, foot flat, slowly tighten muscles on right thigh of straight leg while counting out loud to 5.  Repeat 30 times, 2 sessions per day.          Hip Abduction / Adduction: with Extended Knee (Supine)        Bring right leg out to side and return. Keep knee straight.  Repeat 30 times, 2 sessions per day.         HIP / KNEE: Flexion, Heel Slides - Supine        Slide right heel up toward buttocks, keeping leg in straight line. Repeat 30 times, 2 sessions per day.  Use towel or pillowcase under heel as needed.       Straight Leg Raise        Bend left leg. Raise right leg 8-12 inches with knee locked. Exhale and tighten thigh muscles while raising leg.   Repeat 30 times, 2 sessions per day.           KNEE: Extension, Long Arc Quads - Sitting        Raise right leg until knee is straight.  Repeat 30 times, 2 sessions per day        KNEE: Flexion / Extension - Sitting        Sit at edge of surface, foot on towel or pillowcase. Bend and straighten right knee.  Repeat 30 times, 2 sessions per day.   Use opposite leg to increase knee flexion.    *Keep dressing dry and intact, do not remove dressing, if dressing becomes wet or bloody notify home health staff.  Swingbed/Home Health will change your dressing post of day #3 and day #10, give them that special dressing we sent home with you. If patient has a JUSTIN system leave on for 7 days then change dressing.  *Continue incentive spirometry at least every 2 hours while awake.  *Continue white stockings remove 2 times a day for 1 hour and replace. Once dressing is changed they will apply the other stocking to surgery leg.  *Elevate surgery leg at ankle on pillow, no pillow under knees.  *Take laxative of choice to have a bowel movement at least by tomorrow and then every other day.  *Increase fluids by mouth.  *Dr. Indra Matos total knees should wear knee  immobilizer at night and remove during the day.  *Staples will be removed at follow up appointment  *Notify [facility]/home health staff if any concerns.

## 2023-06-26 NOTE — HPI
Thank you for consult:    Chief complaint:  Right knee pain.    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  Patient states she injured her right knee at work in 2017.  States has had discomfort with it since.  More than a year ago she had a meniscus tear in right knee requiring a knee scope.  Has had continued pain leading to her knee surgery.  Denies any significant pain to her left knee.     Review of system:  See above.  Wears glasses without recent change in vision.  States also has some chronic back issues which she states initiated at her work.  Denies any snoring and states sleep issues seemed to come from her back and chronic knee pain.  Review of systems otherwise.    Past medical history:  -hypertension greater than 15 years.  -diabetes mellitus type 2 treated with oral agents  -diagnosed 2020 with breast cancer status post left mastectomy.  No chemotherapy or radiation needed.  Followed by Dr. Warner.    Past surgical history:  Right knee scope  Right total knee replacement this admission  Previous left mastectomy in 2020  Hysterectomy with bilateral salpingo-oophorectomy  Low back surgery    Patient list Mobic as an allergy    Social history:  Lives by herself  Has children that live in the area that could possibly help her as needed  Would like to go to swing bed following this admission  No history of tobacco use  Drink alcohol only occasionally    Family history:  Mother had heart problems with heart attack starting her 60s    Health maintenance issues:  Primary care provider is Melanie Watts NP  Had flu shot last fall  No prior Pneumovax but recommended she get wanted age 63  Had mammogram in April  Last Pap smear 3 years earlier  Scheduled for colonoscopy next year, states prior colonoscopy showed an area they were concerned about infection and they found no polyps but told her to repeat it next year

## 2023-06-27 ENCOUNTER — ANESTHESIA EVENT (OUTPATIENT)
Dept: SURGERY | Facility: HOSPITAL | Age: 58
End: 2023-06-27
Payer: MEDICARE

## 2023-06-27 LAB
25(OH)D3 SERPL-MCNC: 46.5 NG/ML
ALBUMIN SERPL BCP-MCNC: 3 G/DL (ref 3.5–5)
ALP SERPL-CCNC: 54 U/L (ref 50–130)
ALT SERPL W P-5'-P-CCNC: 17 U/L (ref 13–56)
ANION GAP SERPL CALCULATED.3IONS-SCNC: 12 MMOL/L (ref 7–16)
AST SERPL W P-5'-P-CCNC: 16 U/L (ref 15–37)
BASOPHILS # BLD AUTO: 0.05 K/UL (ref 0–0.2)
BASOPHILS NFR BLD AUTO: 0.4 % (ref 0–1)
BILIRUB DIRECT SERPL-MCNC: 0.1 MG/DL (ref 0–0.2)
BILIRUB SERPL-MCNC: 0.3 MG/DL (ref ?–1.2)
BUN SERPL-MCNC: 14 MG/DL (ref 7–18)
BUN/CREAT SERPL: 11 (ref 6–20)
CALCIUM SERPL-MCNC: 8.2 MG/DL (ref 8.5–10.1)
CHLORIDE SERPL-SCNC: 103 MMOL/L (ref 98–107)
CO2 SERPL-SCNC: 27 MMOL/L (ref 21–32)
CREAT SERPL-MCNC: 1.3 MG/DL (ref 0.55–1.02)
DIFFERENTIAL METHOD BLD: ABNORMAL
EGFR (NO RACE VARIABLE) (RUSH/TITUS): 47 ML/MIN/1.73M2
EOSINOPHIL # BLD AUTO: 0 K/UL (ref 0–0.5)
EOSINOPHIL NFR BLD AUTO: 0 % (ref 1–4)
ERYTHROCYTE [DISTWIDTH] IN BLOOD BY AUTOMATED COUNT: 13.3 % (ref 11.5–14.5)
EST. AVERAGE GLUCOSE BLD GHB EST-MCNC: 120 MG/DL
GLUCOSE SERPL-MCNC: 120 MG/DL (ref 74–106)
GLUCOSE SERPL-MCNC: 121 MG/DL (ref 70–105)
GLUCOSE SERPL-MCNC: 125 MG/DL (ref 70–105)
GLUCOSE SERPL-MCNC: 130 MG/DL (ref 70–105)
GLUCOSE SERPL-MCNC: 82 MG/DL (ref 70–105)
HBA1C MFR BLD HPLC: 6.2 % (ref 4.5–6.6)
HCT VFR BLD AUTO: 30.8 % (ref 38–47)
HGB BLD-MCNC: 10.1 G/DL (ref 12–16)
IMM GRANULOCYTES # BLD AUTO: 0.05 K/UL (ref 0–0.04)
IMM GRANULOCYTES NFR BLD: 0.4 % (ref 0–0.4)
LYMPHOCYTES # BLD AUTO: 1.87 K/UL (ref 1–4.8)
LYMPHOCYTES NFR BLD AUTO: 16.7 % (ref 27–41)
MAGNESIUM SERPL-MCNC: 1.3 MG/DL (ref 1.7–2.3)
MCH RBC QN AUTO: 28.1 PG (ref 27–31)
MCHC RBC AUTO-ENTMCNC: 32.8 G/DL (ref 32–36)
MCV RBC AUTO: 85.8 FL (ref 80–96)
MONOCYTES # BLD AUTO: 1.2 K/UL (ref 0–0.8)
MONOCYTES NFR BLD AUTO: 10.7 % (ref 2–6)
MPC BLD CALC-MCNC: 10.1 FL (ref 9.4–12.4)
NEUTROPHILS # BLD AUTO: 8.05 K/UL (ref 1.8–7.7)
NEUTROPHILS NFR BLD AUTO: 71.8 % (ref 53–65)
NRBC # BLD AUTO: 0 X10E3/UL
NRBC, AUTO (.00): 0 %
PLATELET # BLD AUTO: 247 K/UL (ref 150–400)
POTASSIUM SERPL-SCNC: 3.4 MMOL/L (ref 3.5–5.1)
PROT SERPL-MCNC: 6 G/DL (ref 6.4–8.2)
RBC # BLD AUTO: 3.59 M/UL (ref 4.2–5.4)
SODIUM SERPL-SCNC: 139 MMOL/L (ref 136–145)
T4 SERPL-MCNC: 8.9 ΜG/DL (ref 4.8–13.9)
TSH SERPL DL<=0.005 MIU/L-ACNC: 0.36 UIU/ML (ref 0.36–3.74)
WBC # BLD AUTO: 11.22 K/UL (ref 4.5–11)

## 2023-06-27 PROCEDURE — 84443 ASSAY THYROID STIM HORMONE: CPT | Performed by: HOSPITALIST

## 2023-06-27 PROCEDURE — 93010 EKG 12-LEAD: ICD-10-PCS | Mod: ,,, | Performed by: HOSPITALIST

## 2023-06-27 PROCEDURE — 94761 N-INVAS EAR/PLS OXIMETRY MLT: CPT

## 2023-06-27 PROCEDURE — 99232 SBSQ HOSP IP/OBS MODERATE 35: CPT | Mod: ,,, | Performed by: HOSPITALIST

## 2023-06-27 PROCEDURE — 97116 GAIT TRAINING THERAPY: CPT

## 2023-06-27 PROCEDURE — 82306 VITAMIN D 25 HYDROXY: CPT | Performed by: HOSPITALIST

## 2023-06-27 PROCEDURE — 84436 ASSAY OF TOTAL THYROXINE: CPT | Performed by: HOSPITALIST

## 2023-06-27 PROCEDURE — 99232 PR SUBSEQUENT HOSPITAL CARE,LEVL II: ICD-10-PCS | Mod: ,,, | Performed by: HOSPITALIST

## 2023-06-27 PROCEDURE — 82962 GLUCOSE BLOOD TEST: CPT

## 2023-06-27 PROCEDURE — 64447 NJX AA&/STRD FEMORAL NRV IMG: CPT | Mod: 59,RT,, | Performed by: ANESTHESIOLOGY

## 2023-06-27 PROCEDURE — 93010 ELECTROCARDIOGRAM REPORT: CPT | Mod: ,,, | Performed by: HOSPITALIST

## 2023-06-27 PROCEDURE — 85025 COMPLETE CBC W/AUTO DIFF WBC: CPT | Performed by: ORTHOPAEDIC SURGERY

## 2023-06-27 PROCEDURE — 64447 PERIPHERAL BLOCK: ICD-10-PCS | Mod: 59,RT,, | Performed by: ANESTHESIOLOGY

## 2023-06-27 PROCEDURE — 25000003 PHARM REV CODE 250: Performed by: ORTHOPAEDIC SURGERY

## 2023-06-27 PROCEDURE — 63600175 PHARM REV CODE 636 W HCPCS: Performed by: ORTHOPAEDIC SURGERY

## 2023-06-27 PROCEDURE — 93005 ELECTROCARDIOGRAM TRACING: CPT

## 2023-06-27 PROCEDURE — 83735 ASSAY OF MAGNESIUM: CPT | Performed by: HOSPITALIST

## 2023-06-27 PROCEDURE — 80048 BASIC METABOLIC PNL TOTAL CA: CPT | Performed by: ORTHOPAEDIC SURGERY

## 2023-06-27 PROCEDURE — 97535 SELF CARE MNGMENT TRAINING: CPT

## 2023-06-27 PROCEDURE — 80076 HEPATIC FUNCTION PANEL: CPT | Performed by: HOSPITALIST

## 2023-06-27 PROCEDURE — 83036 HEMOGLOBIN GLYCOSYLATED A1C: CPT | Performed by: HOSPITALIST

## 2023-06-27 PROCEDURE — 97110 THERAPEUTIC EXERCISES: CPT

## 2023-06-27 RX ORDER — ONDANSETRON 4 MG/1
8 TABLET, ORALLY DISINTEGRATING ORAL EVERY 8 HOURS PRN
Qty: 30 TABLET | Refills: 0 | Status: ON HOLD | OUTPATIENT
Start: 2023-06-27 | End: 2023-07-12 | Stop reason: HOSPADM

## 2023-06-27 RX ORDER — METFORMIN HYDROCHLORIDE 500 MG/1
1000 TABLET ORAL 2 TIMES DAILY WITH MEALS
Status: DISCONTINUED | OUTPATIENT
Start: 2023-06-29 | End: 2023-06-29

## 2023-06-27 RX ORDER — MAGNESIUM SULFATE HEPTAHYDRATE 40 MG/ML
2 INJECTION, SOLUTION INTRAVENOUS ONCE
Status: COMPLETED | OUTPATIENT
Start: 2023-06-27 | End: 2023-06-28

## 2023-06-27 RX ORDER — OXYCODONE AND ACETAMINOPHEN 10; 325 MG/1; MG/1
1 TABLET ORAL EVERY 6 HOURS PRN
Qty: 30 TABLET | Refills: 0 | Status: SHIPPED | OUTPATIENT
Start: 2023-06-27

## 2023-06-27 RX ORDER — AMOXICILLIN 250 MG
1 CAPSULE ORAL 2 TIMES DAILY
Qty: 60 TABLET | Refills: 2 | Status: ON HOLD | OUTPATIENT
Start: 2023-06-27 | End: 2023-07-12 | Stop reason: SDUPTHER

## 2023-06-27 RX ORDER — GLIPIZIDE 5 MG/1
5 TABLET ORAL
Status: DISCONTINUED | OUTPATIENT
Start: 2023-06-29 | End: 2023-06-30 | Stop reason: HOSPADM

## 2023-06-27 RX ORDER — ASPIRIN 81 MG/1
81 TABLET ORAL 2 TIMES DAILY
Qty: 60 TABLET | Refills: 0 | Status: SHIPPED | OUTPATIENT
Start: 2023-06-27 | End: 2023-07-27

## 2023-06-27 RX ORDER — ROPIVACAINE HYDROCHLORIDE 7.5 MG/ML
INJECTION, SOLUTION EPIDURAL; PERINEURAL
Status: DISCONTINUED | OUTPATIENT
Start: 2023-06-26 | End: 2023-06-27

## 2023-06-27 RX ORDER — POTASSIUM CHLORIDE 20 MEQ/1
40 TABLET, EXTENDED RELEASE ORAL ONCE
Status: COMPLETED | OUTPATIENT
Start: 2023-06-27 | End: 2023-06-28

## 2023-06-27 RX ADMIN — OXYCODONE HYDROCHLORIDE 10 MG: 5 TABLET ORAL at 10:06

## 2023-06-27 RX ADMIN — DOCUSATE SODIUM 100 MG: 100 CAPSULE, LIQUID FILLED ORAL at 09:06

## 2023-06-27 RX ADMIN — ASPIRIN 81 MG 324 MG: 81 TABLET ORAL at 09:06

## 2023-06-27 RX ADMIN — ATORVASTATIN CALCIUM 40 MG: 40 TABLET, FILM COATED ORAL at 09:06

## 2023-06-27 RX ADMIN — PREGABALIN 75 MG: 75 CAPSULE ORAL at 09:06

## 2023-06-27 RX ADMIN — AMLODIPINE BESYLATE 5 MG: 5 TABLET ORAL at 09:06

## 2023-06-27 RX ADMIN — ACETAMINOPHEN 1000 MG: 500 TABLET ORAL at 05:06

## 2023-06-27 RX ADMIN — CEFAZOLIN 2 G: 2 INJECTION, POWDER, FOR SOLUTION INTRAMUSCULAR; INTRAVENOUS at 01:06

## 2023-06-27 RX ADMIN — OXYCODONE HYDROCHLORIDE 10 MG: 5 TABLET ORAL at 05:06

## 2023-06-27 RX ADMIN — MUPIROCIN 1 G: 20 OINTMENT TOPICAL at 09:06

## 2023-06-27 RX ADMIN — FAMOTIDINE 20 MG: 20 TABLET, FILM COATED ORAL at 09:06

## 2023-06-27 RX ADMIN — HYDROCHLOROTHIAZIDE 25 MG: 25 TABLET ORAL at 09:06

## 2023-06-27 RX ADMIN — ACETAMINOPHEN 1000 MG: 500 TABLET ORAL at 02:06

## 2023-06-27 RX ADMIN — METFORMIN HYDROCHLORIDE 1000 MG: 500 TABLET ORAL at 05:06

## 2023-06-27 RX ADMIN — OXYCODONE HYDROCHLORIDE 10 MG: 5 TABLET ORAL at 06:06

## 2023-06-27 RX ADMIN — METFORMIN HYDROCHLORIDE 1000 MG: 500 TABLET ORAL at 09:06

## 2023-06-27 RX ADMIN — POLYETHYLENE GLYCOL 3350 17 G: 17 POWDER, FOR SOLUTION ORAL at 09:06

## 2023-06-27 RX ADMIN — OXYCODONE HYDROCHLORIDE 10 MG: 5 TABLET ORAL at 01:06

## 2023-06-27 RX ADMIN — GLIPIZIDE 10 MG: 5 TABLET ORAL at 09:06

## 2023-06-27 NOTE — PT/OT/SLP PROGRESS
Physical Therapy      Patient Name:  Sadie Martinez   MRN:  [id number]    Patient not seen today secondary to Therapist assessment (Pt informed of abnormal placement of R knee implant with plan for revision.). Will follow-up 6/28/23.

## 2023-06-27 NOTE — ANESTHESIA PROCEDURE NOTES
Peripheral Block    Patient location during procedure: OR   Block not for primary anesthetic.  Reason for block: at surgeon's request and post-op pain management   Post-op Pain Location: right knee replacement   Start time: 6/26/2023 9:42 AM  Timeout: 6/26/2023 9:40 AM   End time: 6/26/2023 9:42 AM    Staffing  Authorizing Provider: Kaushal Jackson MD  Performing Provider: Kaushal Jackson MD    Preanesthetic Checklist  Completed: patient identified, IV checked, site marked, risks and benefits discussed, surgical consent, monitors and equipment checked, pre-op evaluation and timeout performed  Peripheral Block  Patient position: supine  Prep: ChloraPrep  Patient monitoring: heart rate, cardiac monitor, continuous pulse ox, continuous capnometry and frequent blood pressure checks  Block type: adductor canal  Laterality: right  Injection technique: single shot  Needle  Needle type: Stimuplex   Needle gauge: 20 G  Needle length: 4 in  Needle localization: ultrasound guidance   -ultrasound image captured on disc.  Assessment  Injection assessment: negative aspiration, negative parasthesia and local visualized surrounding nerve  Paresthesia pain: none  Heart rate change: no  Slow fractionated injection: yes  Pain Tolerance: comfortable throughout block and no complaints  Medications:    Medications: ROPIvacaine (NAROPIN) injection 0.75% - Perineural   30 mL - 6/26/2023 9:40:00 AM

## 2023-06-27 NOTE — PLAN OF CARE
MIGUEL ANGEL f/u with [facility] on this morning. [employer] precert still pending. SW informed that decision will likely not be reached on today. Pt to dc home with [facility] on today. Pt has rw for home use. Ss following for further dc needs.

## 2023-06-27 NOTE — ANESTHESIA PROCEDURE NOTES
Spinal    Diagnosis: total joint replacement  Patient location during procedure: OR  Start time: 6/26/2023 9:28 AM  Timeout: 6/26/2023 9:28 AM  End time: 6/26/2023 9:28 AM    Staffing  Authorizing Provider: Kaushal Jackson MD  Performing Provider: Kaushal Jackson MD    Preanesthetic Checklist  Completed: patient identified, IV checked, risks and benefits discussed, surgical consent, monitors and equipment checked, pre-op evaluation and timeout performed  Spinal Block  Patient position: sitting  Prep: Betadine  Patient monitoring: heart rate, continuous pulse ox, continuous capnometry and frequent blood pressure checks  Approach: midline  Location: L3-4  Injection technique: single shot  CSF Fluid: clear free-flowing CSF  Needle  Needle type: Quincke   Needle gauge: 22 G  Needle length: 4 in  Needle localization: anatomical landmarks  Assessment  Sensory level: T8   Dermatomal levels determined by alcohol wipe  Ease of block: easy  Patient's tolerance of the procedure: comfortable throughout block and no complaints

## 2023-06-27 NOTE — PLAN OF CARE
SW informed that pt will not dc on today. Pt to go back to OR due to misplacement of joint. [facility] and [facility] informed. Pt does not need dme. Ss following for further dc needs.

## 2023-06-27 NOTE — PROGRESS NOTES
[facility]  Adult Nutrition  First Assessment Note         Reason for Assessment  Reason For Assessment: identified at risk by screening criteria   Nutrition Risk Screen: no indicators present     Patient seen for MST. Patient does not meet criteria for malnutrition at this time. She is on a 1800cal ADA diet. She reports that she has been trying to lose weight. RD asked her if she had any questions on a diabetic diet. Patient reports that she does well with her diet.       Per MD notes:  HPI: Thank you for consult:     Chief complaint:  Right knee pain.     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  Patient states she injured her right knee at work in 2017.  States has had discomfort with it since.  More than a year ago she had a meniscus tear in right knee requiring a knee scope.  Has had continued pain leading to her knee surgery.  Denies any significant pain to her left knee.      Review of system:  See above.  Wears glasses without recent change in vision.  States also has some chronic back issues which she states initiated at her work.  Denies any snoring and states sleep issues seemed to come from her back and chronic knee pain.  Review of systems otherwise.     Past medical history:  -hypertension greater than 15 years.  -diabetes mellitus type 2 treated with oral agents  -diagnosed 2020 with breast cancer status post left mastectomy.  No chemotherapy or radiation needed.  Followed by Dr. Warner.     Past surgical history:  Right knee scope  Right total knee replacement this admission  Previous left mastectomy in 2020  Hysterectomy with bilateral salpingo-oophorectomy  Low back surgery     Patient list Mobic as an allergy      Malnutrition  Is Patient Malnourished: No  Skin Integrity  Omar Risk Assessment  Sensory Perception: 4-->no impairment  Moisture: 4-->rarely moist  Activity: 3-->walks  occasionally  Mobility: 3-->slightly limited  Nutrition: 3-->adequate  Friction and Shear: 3-->no apparent problem  Omar Score: 20  Comments on skin integrity: no issues  Nutrition Diagnosis  Altered Nutrition Related Lab Values   related to Diabetes Mellitus as evidenced by elevated blood glucose    Nutrition Diagnosis Status: Chronic/ continues      Nutrition Risk  Level of Risk/Frequency of Follow-up: low  Comments on nutrition risk: no concerns   Recent Labs   Lab 06/27/23  0336 06/27/23  0635   *  --    POCGLU  --  125*     Comments on Glucose: elevated with diabetes  Nutrition Prescription / Recommendations  Recommendation/Intervention: Recommend continue with current po diet as appropriate.  Goals: weight maintenance  Nutrition Goal Status: new  Current Diet Order: 1800cal ADA  Chewing or Swallowing Difficulty?: No Chewing or swallowing difficulty  Recommended Diet: Consistent Carbohydrate 1800 (60g Carbs)  Recommended Oral Supplement: No Oral Supplements  Is Nutrition Support Recommended: No  Is Education Recommended: No  Monitor and Evaluation  % current Intake: P.O. intake of 50 - 75 %  % intake to meet estimated needs: 50 - 75 %  Food and Nutrient Intake: energy intake  Anthropometric Measurements: weight, weight change, body mass index  Biochemical Data, Medical Tests and Procedures: electrolyte and renal panel, gastrointestinal profile, glucose/endocrine profile, inflammatory profile, lipid profile  Nutrition-Focused Physical Findings: overall appearance, extremities, muscles and bones, head and eyes, skin     Current Medical Diagnosis and Past Medical History     Past Medical History:   Diagnosis Date    Arthritis     Breast cancer     Left breast mastectomy 4/16/2020    Cancer     Diabetes mellitus, type 2     Hypertension      Nutrition/Diet History  Spiritual, Cultural Beliefs, Taoism Practices, Values that Affect Care: no  Food Allergies: NKFA  Factors Affecting Nutritional Intake:  "None identified at this time  Lab/Procedures/Meds  Recent Labs   Lab 06/27/23  0336      K 3.4*   BUN 14   CREATININE 1.30*   CALCIUM 8.2*   ALBUMIN 3.0*      ALT 17   AST 16     Last A1c:   Lab Results   Component Value Date    HGBA1C 6.2 06/27/2023     Lab Results   Component Value Date    RBC 3.59 (L) 06/27/2023    HGB 10.1 (L) 06/27/2023    HCT 30.8 (L) 06/27/2023    MCV 85.8 06/27/2023    MCH 28.1 06/27/2023    MCHC 32.8 06/27/2023     Pertinent Labs Reviewed: reviewed  Pertinent Labs Comments: 06/27/23 03:36  Sodium: 139  Potassium: 3.4 (L)  Chloride: 103  CO2: 27  Anion Gap: 12  BUN: 14  Creatinine: 1.30 (H)  BUN/CREAT RATIO: 11  eGFR: 47 (L)  Glucose: 120 (H)  Calcium: 8.2 (L)  Magnesium: 1.3 (L)  Alkaline Phosphatase: 54  PROTEIN TOTAL: 6.0 (L)  Albumin: 3.0 (L)  BILIRUBIN TOTAL: 0.3  Bilirubin Direct: 0.1  AST: 16  ALT: 17      (L): Data is abnormally low  (H): Data is abnormally high  Pertinent Medications Reviewed: reviewed  Anthropometrics  Temp: 97.8 °F (36.6 °C)  Height Method: Stated  Height: 5' 2" (157.5 cm)  Height (inches): 62 in  Weight Method: Stated  Weight: 74.4 kg (164 lb)  Weight (lb): 164 lb  Ideal Body Weight (IBW), Female: 110 lb  % Ideal Body Weight, Female (lb): 149.09 %  BMI (Calculated): 30     Estimated/Assessed Needs      Temp: 97.8 °F (36.6 °C)Oral  Weight Used For Calorie Calculations: 74.4 kg (164 lb 0.4 oz)   Energy Need Method: Kcal/kg Energy Calorie Requirements (kcal): 1529-3403  Weight Used For Protein Calculations: 74.4 kg (164 lb 0.4 oz)  Protein Requirements: 74-89       RDA Method (mL): 1860     Nutrition by Nursing  Diet/Nutrition Received: regular           Last Bowel Movement: 06/23/23              Nutrition Follow-Up  RD Follow-up?: Yes     "

## 2023-06-27 NOTE — PROGRESS NOTES
[facility]  Orthopaedic Progress Note  Progress Note    Subjective:     Interval History:  We repeated x-rays and found that there was some spin out of the polyethylene    Post-Op Info:  Procedure(s) (LRB):  ARTHROPLASTY, KNEE, TOTAL, USING COMPUTER-ASSISTED NAVIGATION (Right)   1 Day Post-Op     Medications:  Continuous Infusions:  Scheduled Meds:   amLODIPine  5 mg Oral Daily    aspirin  324 mg Oral Daily    atorvastatin  40 mg Oral Daily    docusate sodium  100 mg Oral Q12H    famotidine  20 mg Oral BID    glipiZIDE  10 mg Oral Daily with breakfast    hydroCHLOROthiazide  25 mg Oral Daily    metFORMIN  1,000 mg Oral BID WM    mupirocin  1 g Nasal BID    polyethylene glycol  17 g Oral Daily    pregabalin  75 mg Oral BID     PRN Meds:bisacodyL, calcium carbonate, dextrose 10%, dextrose 10%, dextrose, dextrose, glucagon (human recombinant), insulin aspart U-100, ondansetron, oxyCODONE, oxyCODONE, sodium chloride 0.9%, zolpidem     Objective:     Vital Signs (Most Recent):  Temp: 98.6 °F (37 °C) (06/27/23 1100)  Pulse: 96 (06/27/23 1100)  Resp: 16 (06/27/23 1100)  BP: 118/63 (06/27/23 1100)  SpO2: 99 % (06/27/23 1100) Vital Signs (24h Range):  Temp:  [97.1 °F (36.2 °C)-98.6 °F (37 °C)] 98.6 °F (37 °C)  Pulse:  [75-96] 96  Resp:  [16-18] 16  SpO2:  [98 %-100 %] 99 %  BP: (111-126)/(63-76) 118/63       Intake/Output Summary (Last 24 hours) at 6/27/2023 1542  Last data filed at 6/26/2023 2028  Gross per 24 hour   Intake 120 ml   Output --   Net 120 ml       Physical Exam unable to fully extend the knee can flex to about 30°    Significant Labs:  Recent Lab Results  (Last 5 results in the past 24 hours)        06/27/23  1107   06/27/23  0635   06/27/23  0336   06/26/23 2055   06/26/23  1623        Albumin     3.0           Alkaline Phosphatase     54           ALT     17           Anion Gap     12           AST     16           Baso #     0.05           Basophil %     0.4           Bilirubin  Direct     0.1           BILIRUBIN TOTAL     0.3           BUN     14           BUN/CREAT RATIO     11           Calcium     8.2           Chloride     103           CO2     27           Creatinine     1.30           Differential Type     Auto           eGFR     47           Eos #     0.00           Eosinophil %     0.0           Estimated Avg Glucose     120           Glucose     120           Hematocrit     30.8           Hemoglobin     10.1           Hemoglobin A1C External     6.2  Comment:   Normal:               <5.7%  Pre-Diabetic:       5.7% to 6.4%  Diabetic:             >6.4%  Diabetic Goal:     <7%           Immature Grans (Abs)     0.05           Immature Granulocytes     0.4           Lymph #     1.87           Lymph %     16.7           Magnesium     1.3           MCH     28.1           MCHC     32.8           MCV     85.8           Mono #     1.20           Mono %     10.7           MPV     10.1           Neutrophils, Abs     8.05           Neutrophils Relative     71.8           nRBC     0.0           NUCLEATED RBC ABSOLUTE     0.00           Platelets     247           POC Glucose 121   125     200   250       Potassium     3.4           PROTEIN TOTAL     6.0           RBC     3.59           RDW     13.3           Sodium     139           T4 Total     8.9           TSH     0.357           Vit D, 25-Hydroxy     46.5  Comment: Vitamin D 25-OH Adult Reference Values:  Deficiency: <20 ng/mL  Insufficiency: 20 - <30 ng/mL  Sufficiency: 30 -100 ng/mL    Vitamin D 25-OH Pediatric Reference Values:  Deficiency: <15 ng/mL  Insufficiency: 15 - <20 ng/mL  Sufficiency: 20 - 100 ng/mL           WBC     11.22                                  Significant Diagnostics:  X-Ray Knee 1 or 2 View Right  Narrative: EXAMINATION:  XR KNEE 1 OR 2 VIEW RIGHT    CLINICAL HISTORY:  post op;  Unilateral primary osteoarthritis, right knee    TECHNIQUE:  AP and lateral views of the right knee were performed.    COMPARISON:  Prior  day    FINDINGS:  Postoperative changes are again seen from total knee arthroplasty.  There is slight angulation and patient positioning on today's exam but the prosthetic overall projects in expected location.  Impression: Postoperative changes.    Electronically signed by: Faustino Agarwal  Date:    06/27/2023  Time:    08:38           Assessment/Plan:     Active Diagnoses:    Diagnosis Date Noted POA    PRINCIPAL PROBLEM:  Localized osteoarthritis of right knee [M17.11] 06/26/2023 Yes    Systolic hypertension [I10] 06/26/2023 Yes    Type 2 diabetes mellitus without complication, without long-term current use of insulin [E11.9] 06/26/2023 Yes    History of left breast cancer [Z85.3] 06/26/2023 Not Applicable      Problems Resolved During this Admission:   I repeated her x-rays and found that she had likely dislodged polyethylene component.  We found this out about lunchtime when she was eating so we unfortunately can not take her back to surgery today will plan for closed reduction versus open reduction and polyethylene exchange tomorrow.      Indra Matos MD  Orthopaedic Surgery  [facility]

## 2023-06-27 NOTE — PT/OT/SLP PROGRESS
Occupational Therapy   Treatment    Name: Sadie Martinez  MRN: [id number]  Admitting Diagnosis:  Localized osteoarthritis of right knee  1 Day Post-Op    Recommendations:     Discharge Recommendations: home with home health, rehabilitation facility, nursing facility, skilled  Discharge Equipment Recommendations:  none  Barriers to discharge:  None    Assessment:     Sadie Martinez is a 58 y.o. female with a medical diagnosis of Localized osteoarthritis of right knee.  She presents with complaint of (R) Knee pain. Pt agreed to OT treatment focusing on ADL retraining.Bath performed sitting EOB.(I) with balance. Performance deficits affecting function are impaired self care skills, impaired functional mobility, gait instability, pain, decreased lower extremity function.     Rehab Prognosis:  Good; patient would benefit from acute skilled OT services to address these deficits and reach maximum level of function.       Plan:     Patient to be seen 5 x/week to address the above listed problems via self-care/home management, therapeutic activities, therapeutic exercises  Plan of Care Expires:    Plan of Care Reviewed with: patient    Subjective     Chief Complaint: (R) TKR  Patient/Family Comments/goals: Swingbed vs returning home  Pain/Comfort:  Pain Rating 1: 9/10  Location - Side 1: Right  Location 1: knee  Pain Addressed 1: Pre-medicate for activity, Reposition, Distraction  Pain Rating Post-Intervention 1: 9/10    Objective:     Communicated with: SHAUNA Painter prior to session.  Patient found HOB elevated with peripheral IV upon OT entry to room.    General Precautions: Standard, fall    Orthopedic Precautions:RLE weight bearing as tolerated  Braces: N/A  Respiratory Status: Room air     Occupational Performance:     Bed Mobility:    Patient completed Rolling/Turning to Right with modified independence  Patient completed Supine to Sit with modified independence     Functional Mobility/Transfers:  Patient completed  Sit <> Stand Transfer with stand by assistance  with  gait belt to stand to RW with cueing to push up from bed.   Patient completed Bed <> Chair Transfer using Step Transfer technique with stand by assistance/CGA  with rolling walker and gait belt  Functional Mobility: SBA/CGA    Activities of Daily Living:  Grooming: independence combing hair and brushing teeth  Bathing: independence with UE bathing, pt crossed large towel over shoulders to bathe back. Pt stood with SBA for perineum/buttock bathing with RW. I with LE bathing.  Upper Body Dressing: independence donning pullover dress  Lower Body Dressing: independence donning SONI hose on (L) and (B) socks. (I)to carmen panties over feet. Pt educated to carmen affected (R) LE first and SBA to carmen over hips.      [facility] 6 Click ADL: 22    Treatment & Education:  Pt participated well with ADL retraining. Pt able to complete task without use of AD. Pt reports having a reacher at home.    Patient left up in chair with all lines intact, call button in reach, nurse notified, and daughter present    GOALS:   Multidisciplinary Problems       Occupational Therapy Goals          Problem: Occupational Therapy    Goal Priority Disciplines Outcome Interventions   Occupational Therapy Goal     OT, PT/OT Ongoing, Progressing    Description: STG:  Pt will perform grooming with setup  Pt will bathe with min a  Pt will perform UE dressing with (I)   Pt will perform LE dressing with I/Mod I  Pt will transfer bed/chair/bsc with (S)  Pt will perform standing task x 2 min with (S)   Pt will tolerate 15 minutes of tx without fatigue      LT.Restore to max I with self care and mobility.                         Time Tracking:     OT Date of Treatment: 23  OT Start Time: 101  OT Stop Time: 1050  OT Total Time (min): 33 min    Billable Minutes:Self Care/Home Management 30    OT/MONSTER: OT          2023

## 2023-06-27 NOTE — ANESTHESIA POSTPROCEDURE EVALUATION
Anesthesia Post Evaluation    Patient: Sadie Martinez    Procedure(s) Performed: Procedure(s) (LRB):  ARTHROPLASTY, KNEE, TOTAL, USING COMPUTER-ASSISTED NAVIGATION (Right)    Final Anesthesia Type: general      Patient location during evaluation: PACU  Patient participation: Yes- Able to Participate  Level of consciousness: awake and alert  Post-procedure vital signs: reviewed and stable  Pain management: adequate  Airway patency: patent  ERICKA mitigation strategies: Multimodal analgesia and Use of major conduction anesthesia (spinal/epidural) or peripheral nerve block  PONV status at discharge: No PONV  Anesthetic complications: no      Cardiovascular status: blood pressure returned to baseline  Respiratory status: unassisted  Hydration status: euvolemic  Follow-up not needed.          Vitals Value Taken Time   /76 06/27/23 0722   Temp 36.6 °C (97.8 °F) 06/27/23 0722   Pulse 82 06/27/23 0722   Resp 17 06/27/23 1009   SpO2 99 % 06/27/23 0722         Event Time   Out of Recovery 13:55:00         Pain/Key Score: Pain Rating Prior to Med Admin: 9 (6/27/2023 10:09 AM)  Pain Rating Post Med Admin: 3 (6/27/2023  2:03 AM)  Key Score: 10 (6/26/2023  1:55 PM)

## 2023-06-27 NOTE — PROGRESS NOTES
Daily Orthopaedic Progress Note    Sadie Martinez is a 58 y.o. female admitted on 6/26/2023  Hospital Day: 0  Post Op Day: 1 Day Post-Op    Antibiotics (From admission, onward)      Start     Stop Route Frequency Ordered    06/26/23 2100  mupirocin 2 % ointment 1 g         07/01 2059 Nasl 2 times daily 06/26/23 1431             The patient was seen and examined this morning at the bedside. Patient reports no acute issues overnight and adequate control of pain on current regimen.  Patient worked with physical therapy over the last 24 hours.      PHYSICAL EXAM:  Awake/alert/oriented x3, No acute distress, Afebrile, Vital signs stable  Normocephalic, Atraumatic  Good inspiratory effort with unlaboured breathing  Dressings clean/dry/intact, Operative limb neurovascularly intact with 5/5 strength distally and sensation intact to light touch distally; some decreased sensation over the area of the regional block and palpable pulses  Vitals:    06/27/23 0103 06/27/23 0425 06/27/23 0634 06/27/23 0722   BP:  111/66  121/76   BP Location:       Patient Position:       Pulse:  75  82   Resp: 18 18 18 16   Temp:  97.4 °F (36.3 °C)  97.8 °F (36.6 °C)   TempSrc:       SpO2: 100% 99%  99%   Weight:       Height:         I/O last 3 completed shifts:  In: 570 [P.O.:120; I.V.:200; IV Piggyback:250]  Out: -     Significant Labs:  Recent Lab Results  (Last 5 results in the past 24 hours)        06/27/23  0635   06/27/23  0336   06/26/23 2055 06/26/23  1623   06/26/23  1159        Albumin   3.0             Alkaline Phosphatase   54             ALT   17             Anion Gap   12             AST   16             Baso #   0.05             Basophil %   0.4             Bilirubin Direct   0.1             BILIRUBIN TOTAL   0.3             BUN   14             BUN/CREAT RATIO   11             Calcium   8.2             Chloride   103             CO2   27             Creatinine   1.30             Differential Type   Auto             eGFR    47             Eos #   0.00             Eosinophil %   0.0             Estimated Avg Glucose   120             Glucose   120             Hematocrit   30.8             Hemoglobin   10.1             Hemoglobin A1C External   6.2  Comment:   Normal:               <5.7%  Pre-Diabetic:       5.7% to 6.4%  Diabetic:             >6.4%  Diabetic Goal:     <7%             Immature Grans (Abs)   0.05             Immature Granulocytes   0.4             Lymph #   1.87             Lymph %   16.7             Magnesium   1.3             MCH   28.1             MCHC   32.8             MCV   85.8             Mono #   1.20             Mono %   10.7             MPV   10.1             Neutrophils, Abs   8.05             Neutrophils Relative   71.8             nRBC   0.0             NUCLEATED RBC ABSOLUTE   0.00             Platelets   247             POC Glucose 125     200   250   185       Potassium   3.4             PROTEIN TOTAL   6.0             RBC   3.59             RDW   13.3             Sodium   139             T4 Total   8.9             TSH   0.357             Vit D, 25-Hydroxy   46.5  Comment: Vitamin D 25-OH Adult Reference Values:  Deficiency: <20 ng/mL  Insufficiency: 20 - <30 ng/mL  Sufficiency: 30 -100 ng/mL    Vitamin D 25-OH Pediatric Reference Values:  Deficiency: <15 ng/mL  Insufficiency: 15 - <20 ng/mL  Sufficiency: 20 - 100 ng/mL             WBC   11.22                                    Significant Diagnostics:  X-Ray Knee 1 or 2 View Right  Narrative: EXAMINATION:  XR KNEE 1 OR 2 VIEW RIGHT    CLINICAL HISTORY:  po;  Unilateral primary osteoarthritis, right knee    TECHNIQUE:  AP and lateral views of the right knee were performed.    COMPARISON:  Preoperative exam 04/25/2023    FINDINGS:  Postoperative changes right total knee arthroplasty.  Prosthetic projects in expected location.  No acute abnormality identified.  Impression: Postoperative changes    Electronically signed by: Faustino  Ray  Date:    06/26/2023  Time:    12:39       A/P: 58 y.o. female 1 Day Post-Op s/p right TKA  -Continue with current pain control regimen  -Continue with current physical therapy plan  -Continue with DVT prophylaxis  -Anticipate discharge to swing bed today    Indra Matos MD  Orthopaedic Staff Surgeon

## 2023-06-27 NOTE — PT/OT/SLP PROGRESS
Physical Therapy Treatment    Patient Name:  Sadie Martinez   MRN:  [id number]    Recommendations:     Discharge Recommendations: rehabilitation facility, home health PT  Discharge Equipment Recommendations: none  Barriers to discharge:  ongoing medical care    Assessment:     Sadie Martinez is a 58 y.o. female admitted with a medical diagnosis of Localized osteoarthritis of right knee.  She presents with the following impairments/functional limitations: weakness, impaired endurance, impaired functional mobility, gait instability, decreased lower extremity function, pain, decreased ROM, orthopedic precautions. Pt participated well with skilled PT, demo understanding of HEP. Pt planned to d/c home until approve for rehab.     R knee 10-68 degrees.    Rehab Prognosis: Good; patient would benefit from acute skilled PT services to address these deficits and reach maximum level of function.    Recent Surgery: Procedure(s) (LRB):  ARTHROPLASTY, KNEE, TOTAL, USING COMPUTER-ASSISTED NAVIGATION (Right) 1 Day Post-Op    Plan:     During this hospitalization, patient to be seen BID (5x/wk; daily 2x/wk) to address the identified rehab impairments via gait training, therapeutic activities, therapeutic exercises and progress toward the following goals:    Plan of Care Expires:  07/26/23    Subjective     Chief Complaint: R TKA  Patient/Family Comments/goals: agreeable to PT  Pain/Comfort: 8/10 R knee         Objective:     Communicated with DAJA Painter LPN prior to session.  Patient found up in chair with blood pressure cuff, pulse ox (continuous), peripheral IV, knee immobilizer, SCD upon PT entry to room.     General Precautions: Standard, fall  Orthopedic Precautions: RLE weight bearing as tolerated  Braces: Knee immobilizer  Respiratory Status: Room air     Functional Mobility:  Transfers:     Sit to Stand:  contact guard assistance with rolling walker  Gait: 100ft x 2 with RW, CGA with slow debra, initial sequencing for  steppage.  Balance: Fair to Fair +  Stairs:  Pt ascended/descended 3  stair(s) with Rolling Walker with right handrail with Contact Guard Assistance.       AM-PAC 6 CLICK MOBILITY          Treatment & Education:  Bilateral lower extremity exercise x 15 reps: ankle pumps, Quad sets, glut sets, heel slides, hip abduction/adduction, straight leg raises, Long arc quads, and Marching with verbal cues and verbal cues for sequencing and safety     Patient left up in chair with all lines intact, call button in reach, and LPN present..    GOALS:   Multidisciplinary Problems       Physical Therapy Goals          Problem: Physical Therapy    Goal Priority Disciplines Outcome Goal Variances Interventions   Physical Therapy Goal     PT, PT/OT Ongoing, Progressing     Description: Short Term Goals to be met by: 23    Patient will increase functional independence with mobility by performin. Supine to sit with Modified Schuyler  2. Sit to stand transfer with Modified Schuyler  3. Bed to chair transfer with Modified Schuyler using Rolling Walker, WBAT R LE  4. Gait  x 100 feet with Modified Schuyler using Rolling Walker, WBAT R LE.   5. Lower extremity exercise program x30 reps per handout, with assistance as needed  6. Knee ROM 0-90  7. Negotiate 3 steps with 1 rail with CGA    Long Term Goals to be met by: 23    Pt will regain full independent functional mobility to return to prior activities of daily living.                        Time Tracking:     PT Received On: 23  PT Start Time: 1125     PT Stop Time: 1200  PT Total Time (min): 35 min     Billable Minutes: Gait Training 15 and Therapeutic Exercise 15    Treatment Type: Evaluation  PT/PTA: PT           2023

## 2023-06-28 ENCOUNTER — ANESTHESIA (OUTPATIENT)
Dept: SURGERY | Facility: HOSPITAL | Age: 58
End: 2023-06-28
Payer: MEDICARE

## 2023-06-28 LAB
GLUCOSE SERPL-MCNC: 113 MG/DL (ref 70–105)
GLUCOSE SERPL-MCNC: 123 MG/DL (ref 70–105)
GLUCOSE SERPL-MCNC: 124 MG/DL (ref 70–105)
GLUCOSE SERPL-MCNC: 146 MG/DL (ref 70–105)
GLUCOSE SERPL-MCNC: 177 MG/DL (ref 70–105)
GLUCOSE SERPL-MCNC: 188 MG/DL (ref 70–105)

## 2023-06-28 PROCEDURE — 63600175 PHARM REV CODE 636 W HCPCS: Performed by: ANESTHESIOLOGY

## 2023-06-28 PROCEDURE — D9220A PRA ANESTHESIA: Mod: CRNA,,, | Performed by: NURSE ANESTHETIST, CERTIFIED REGISTERED

## 2023-06-28 PROCEDURE — 97535 SELF CARE MNGMENT TRAINING: CPT

## 2023-06-28 PROCEDURE — 37000008 HC ANESTHESIA 1ST 15 MINUTES: Performed by: ORTHOPAEDIC SURGERY

## 2023-06-28 PROCEDURE — 97110 THERAPEUTIC EXERCISES: CPT

## 2023-06-28 PROCEDURE — 25000003 PHARM REV CODE 250: Performed by: HOSPITALIST

## 2023-06-28 PROCEDURE — 99232 PR SUBSEQUENT HOSPITAL CARE,LEVL II: ICD-10-PCS | Mod: ,,, | Performed by: HOSPITALIST

## 2023-06-28 PROCEDURE — 36000705 HC OR TIME LEV I EA ADD 15 MIN: Performed by: ORTHOPAEDIC SURGERY

## 2023-06-28 PROCEDURE — 25000003 PHARM REV CODE 250: Performed by: ORTHOPAEDIC SURGERY

## 2023-06-28 PROCEDURE — D9220A PRA ANESTHESIA: Mod: ANES,,, | Performed by: ANESTHESIOLOGY

## 2023-06-28 PROCEDURE — 27570 FIXATION OF KNEE JOINT: CPT | Mod: 78,RT,, | Performed by: ORTHOPAEDIC SURGERY

## 2023-06-28 PROCEDURE — 64447 PERIPHERAL BLOCK: ICD-10-PCS | Mod: 59,RT,, | Performed by: ANESTHESIOLOGY

## 2023-06-28 PROCEDURE — 97116 GAIT TRAINING THERAPY: CPT

## 2023-06-28 PROCEDURE — 64447 NJX AA&/STRD FEMORAL NRV IMG: CPT | Mod: 59,RT,, | Performed by: ANESTHESIOLOGY

## 2023-06-28 PROCEDURE — 94761 N-INVAS EAR/PLS OXIMETRY MLT: CPT | Mod: GZ

## 2023-06-28 PROCEDURE — 63600175 PHARM REV CODE 636 W HCPCS: Performed by: HOSPITALIST

## 2023-06-28 PROCEDURE — 63600175 PHARM REV CODE 636 W HCPCS: Performed by: NURSE ANESTHETIST, CERTIFIED REGISTERED

## 2023-06-28 PROCEDURE — 37000009 HC ANESTHESIA EA ADD 15 MINS: Performed by: ORTHOPAEDIC SURGERY

## 2023-06-28 PROCEDURE — 99232 SBSQ HOSP IP/OBS MODERATE 35: CPT | Mod: ,,, | Performed by: HOSPITALIST

## 2023-06-28 PROCEDURE — 36000704 HC OR TIME LEV I 1ST 15 MIN: Performed by: ORTHOPAEDIC SURGERY

## 2023-06-28 PROCEDURE — D9220A PRA ANESTHESIA: ICD-10-PCS | Mod: ANES,,, | Performed by: ANESTHESIOLOGY

## 2023-06-28 PROCEDURE — 25000003 PHARM REV CODE 250: Performed by: NURSE ANESTHETIST, CERTIFIED REGISTERED

## 2023-06-28 PROCEDURE — 27570 PR MANIPULATN KNEE JT+ANESTHESIA: ICD-10-PCS | Mod: 78,RT,, | Performed by: ORTHOPAEDIC SURGERY

## 2023-06-28 PROCEDURE — 71000033 HC RECOVERY, INTIAL HOUR: Performed by: ORTHOPAEDIC SURGERY

## 2023-06-28 PROCEDURE — 82962 GLUCOSE BLOOD TEST: CPT

## 2023-06-28 PROCEDURE — D9220A PRA ANESTHESIA: ICD-10-PCS | Mod: CRNA,,, | Performed by: NURSE ANESTHETIST, CERTIFIED REGISTERED

## 2023-06-28 RX ORDER — DOCUSATE SODIUM 100 MG/1
100 CAPSULE, LIQUID FILLED ORAL EVERY 12 HOURS
Status: DISCONTINUED | OUTPATIENT
Start: 2023-06-28 | End: 2023-06-30

## 2023-06-28 RX ORDER — LIDOCAINE HYDROCHLORIDE 20 MG/ML
INJECTION, SOLUTION EPIDURAL; INFILTRATION; INTRACAUDAL; PERINEURAL
Status: DISCONTINUED | OUTPATIENT
Start: 2023-06-28 | End: 2023-06-28

## 2023-06-28 RX ORDER — ONDANSETRON 2 MG/ML
INJECTION INTRAMUSCULAR; INTRAVENOUS
Status: DISCONTINUED | OUTPATIENT
Start: 2023-06-28 | End: 2023-06-28

## 2023-06-28 RX ORDER — DEXAMETHASONE SODIUM PHOSPHATE 4 MG/ML
INJECTION, SOLUTION INTRA-ARTICULAR; INTRALESIONAL; INTRAMUSCULAR; INTRAVENOUS; SOFT TISSUE
Status: DISCONTINUED | OUTPATIENT
Start: 2023-06-28 | End: 2023-06-28

## 2023-06-28 RX ORDER — MIDAZOLAM HYDROCHLORIDE 1 MG/ML
INJECTION INTRAMUSCULAR; INTRAVENOUS
Status: DISCONTINUED | OUTPATIENT
Start: 2023-06-28 | End: 2023-06-28

## 2023-06-28 RX ORDER — DIPHENHYDRAMINE HYDROCHLORIDE 50 MG/ML
25 INJECTION INTRAMUSCULAR; INTRAVENOUS EVERY 6 HOURS PRN
Status: DISCONTINUED | OUTPATIENT
Start: 2023-06-28 | End: 2023-06-28 | Stop reason: HOSPADM

## 2023-06-28 RX ORDER — FENTANYL CITRATE 50 UG/ML
INJECTION, SOLUTION INTRAMUSCULAR; INTRAVENOUS
Status: DISCONTINUED | OUTPATIENT
Start: 2023-06-28 | End: 2023-06-28

## 2023-06-28 RX ORDER — DEXAMETHASONE SODIUM PHOSPHATE 10 MG/ML
INJECTION INTRAMUSCULAR; INTRAVENOUS
Status: DISCONTINUED | OUTPATIENT
Start: 2023-06-28 | End: 2023-06-28

## 2023-06-28 RX ORDER — SODIUM CHLORIDE, SODIUM LACTATE, POTASSIUM CHLORIDE, CALCIUM CHLORIDE 600; 310; 30; 20 MG/100ML; MG/100ML; MG/100ML; MG/100ML
INJECTION, SOLUTION INTRAVENOUS CONTINUOUS PRN
Status: DISCONTINUED | OUTPATIENT
Start: 2023-06-28 | End: 2023-06-28

## 2023-06-28 RX ORDER — OXYCODONE HYDROCHLORIDE 5 MG/1
10 TABLET ORAL EVERY 4 HOURS PRN
Status: DISCONTINUED | OUTPATIENT
Start: 2023-06-28 | End: 2023-06-29

## 2023-06-28 RX ORDER — MORPHINE SULFATE 10 MG/ML
4 INJECTION INTRAMUSCULAR; INTRAVENOUS; SUBCUTANEOUS EVERY 5 MIN PRN
Status: DISCONTINUED | OUTPATIENT
Start: 2023-06-28 | End: 2023-06-28 | Stop reason: HOSPADM

## 2023-06-28 RX ORDER — PROPOFOL 10 MG/ML
VIAL (ML) INTRAVENOUS
Status: DISCONTINUED | OUTPATIENT
Start: 2023-06-28 | End: 2023-06-28

## 2023-06-28 RX ORDER — MEPERIDINE HYDROCHLORIDE 25 MG/ML
25 INJECTION INTRAMUSCULAR; INTRAVENOUS; SUBCUTANEOUS EVERY 10 MIN PRN
Status: DISCONTINUED | OUTPATIENT
Start: 2023-06-28 | End: 2023-06-28 | Stop reason: HOSPADM

## 2023-06-28 RX ORDER — ROPIVACAINE HYDROCHLORIDE 7.5 MG/ML
INJECTION, SOLUTION EPIDURAL; PERINEURAL
Status: DISCONTINUED | OUTPATIENT
Start: 2023-06-28 | End: 2023-06-28

## 2023-06-28 RX ORDER — ONDANSETRON 2 MG/ML
4 INJECTION INTRAMUSCULAR; INTRAVENOUS DAILY PRN
Status: DISCONTINUED | OUTPATIENT
Start: 2023-06-28 | End: 2023-06-28 | Stop reason: HOSPADM

## 2023-06-28 RX ORDER — HYDROMORPHONE HYDROCHLORIDE 2 MG/ML
0.5 INJECTION, SOLUTION INTRAMUSCULAR; INTRAVENOUS; SUBCUTANEOUS EVERY 5 MIN PRN
Status: DISCONTINUED | OUTPATIENT
Start: 2023-06-28 | End: 2023-06-28 | Stop reason: HOSPADM

## 2023-06-28 RX ADMIN — ATORVASTATIN CALCIUM 40 MG: 40 TABLET, FILM COATED ORAL at 09:06

## 2023-06-28 RX ADMIN — POTASSIUM CHLORIDE 40 MEQ: 1500 TABLET, EXTENDED RELEASE ORAL at 12:06

## 2023-06-28 RX ADMIN — HYDROCHLOROTHIAZIDE 25 MG: 25 TABLET ORAL at 09:06

## 2023-06-28 RX ADMIN — PREGABALIN 75 MG: 75 CAPSULE ORAL at 09:06

## 2023-06-28 RX ADMIN — PREGABALIN 75 MG: 75 CAPSULE ORAL at 08:06

## 2023-06-28 RX ADMIN — ONDANSETRON 4 MG: 2 INJECTION INTRAMUSCULAR; INTRAVENOUS at 07:06

## 2023-06-28 RX ADMIN — DEXAMETHASONE SODIUM PHOSPHATE 10 MG: 10 INJECTION, SOLUTION INTRAMUSCULAR; INTRAVENOUS at 07:06

## 2023-06-28 RX ADMIN — OXYCODONE HYDROCHLORIDE 10 MG: 5 TABLET ORAL at 03:06

## 2023-06-28 RX ADMIN — MAGNESIUM SULFATE HEPTAHYDRATE 2 G: 40 INJECTION, SOLUTION INTRAVENOUS at 12:06

## 2023-06-28 RX ADMIN — FAMOTIDINE 20 MG: 20 TABLET, FILM COATED ORAL at 09:06

## 2023-06-28 RX ADMIN — FAMOTIDINE 20 MG: 20 TABLET, FILM COATED ORAL at 08:06

## 2023-06-28 RX ADMIN — DOCUSATE SODIUM 100 MG: 100 CAPSULE, LIQUID FILLED ORAL at 08:06

## 2023-06-28 RX ADMIN — AMLODIPINE BESYLATE 5 MG: 5 TABLET ORAL at 09:06

## 2023-06-28 RX ADMIN — OXYCODONE HYDROCHLORIDE 10 MG: 5 TABLET ORAL at 07:06

## 2023-06-28 RX ADMIN — MIDAZOLAM 2 MG: 1 INJECTION INTRAMUSCULAR; INTRAVENOUS at 07:06

## 2023-06-28 RX ADMIN — ROPIVACAINE HYDROCHLORIDE 30 ML: 7.5 INJECTION, SOLUTION EPIDURAL; PERINEURAL at 07:06

## 2023-06-28 RX ADMIN — DEXAMETHASONE SODIUM PHOSPHATE 4 MG: 4 INJECTION, SOLUTION INTRA-ARTICULAR; INTRALESIONAL; INTRAMUSCULAR; INTRAVENOUS; SOFT TISSUE at 07:06

## 2023-06-28 RX ADMIN — MUPIROCIN 1 G: 20 OINTMENT TOPICAL at 09:06

## 2023-06-28 RX ADMIN — FENTANYL CITRATE 100 MCG: 50 INJECTION INTRAMUSCULAR; INTRAVENOUS at 07:06

## 2023-06-28 RX ADMIN — MUPIROCIN 1 G: 20 OINTMENT TOPICAL at 08:06

## 2023-06-28 RX ADMIN — LIDOCAINE HYDROCHLORIDE 80 MG: 20 INJECTION, SOLUTION EPIDURAL; INFILTRATION; INTRACAUDAL; PERINEURAL at 07:06

## 2023-06-28 RX ADMIN — POLYETHYLENE GLYCOL 3350 17 G: 17 POWDER, FOR SOLUTION ORAL at 09:06

## 2023-06-28 RX ADMIN — PROPOFOL 200 MG: 10 INJECTION, EMULSION INTRAVENOUS at 07:06

## 2023-06-28 RX ADMIN — DOCUSATE SODIUM 100 MG: 100 CAPSULE, LIQUID FILLED ORAL at 09:06

## 2023-06-28 RX ADMIN — OXYCODONE HYDROCHLORIDE 10 MG: 5 TABLET ORAL at 12:06

## 2023-06-28 RX ADMIN — SODIUM CHLORIDE, POTASSIUM CHLORIDE, SODIUM LACTATE AND CALCIUM CHLORIDE: 600; 310; 30; 20 INJECTION, SOLUTION INTRAVENOUS at 07:06

## 2023-06-28 RX ADMIN — ASPIRIN 81 MG 324 MG: 81 TABLET ORAL at 09:06

## 2023-06-28 NOTE — ANESTHESIA POSTPROCEDURE EVALUATION
Anesthesia Post Evaluation    Patient: Sadie Martinez    Procedure(s) Performed: Procedure(s) (LRB):  MANIPULATION, KNEE (Right)    Final Anesthesia Type: general      Patient location during evaluation: PACU  Post-procedure vital signs: reviewed and stable  Pain management: adequate  Airway patency: patent    PONV status at discharge: No PONV  Anesthetic complications: no      Cardiovascular status: hemodynamically stable  Respiratory status: unassisted  Hydration status: euvolemic  Follow-up not needed.          Vitals Value Taken Time   /82 06/28/23 1100   Temp 37.8 °C (100 °F) 06/28/23 1100   Pulse 98 06/28/23 1100   Resp 16 06/28/23 1100   SpO2 100 % 06/28/23 1100         Event Time   Out of Recovery 06/28/2023 08:32:00         Pain/Key Score: Pain Rating Prior to Med Admin: 10 (6/28/2023 12:01 AM)  Pain Rating Post Med Admin: 0 (6/28/2023  1:01 AM)  Key Score: 9 (6/28/2023  8:30 AM)

## 2023-06-28 NOTE — HOSPITAL COURSE
06/26 Not much pain. Ortho note seen with need to return to surgery with abnormal position prosthesis. Discussed with Dr Warner and will stop her Arimidex for two weeks to lower risk of DVT. Discussed with pt and she understands. BP and BS Ok. Correct electrolytes.   06/27 Seen prior to surgery in good spirits. Seems medically stable to go. Plan rehab at [place]. BP and BS good.

## 2023-06-28 NOTE — ANESTHESIA PREPROCEDURE EVALUATION
06/27/2023  Sadie Martinez is a 58 y.o., female.      Pre-op Assessment    I have reviewed the Patient Summary Reports.       I have reviewed the Medications.     Review of Systems         Anesthesia Plan  Type of Anesthesia, risks & benefits discussed:    Anesthesia Type: Gen Supraglottic Airway, Regional  Intra-op Monitoring Plan: Standard ASA Monitors  Post Op Pain Control Plan: IV/PO Opioids PRN  Induction:  IV  Informed Consent: Informed consent signed with the Patient and all parties understand the risks and agree with anesthesia plan.  All questions answered.   ASA Score: 3    Ready For Surgery From Anesthesia Perspective.     .  Allergic to mobic    Hct 31  K 3.4  Cr 1.3  Ca 8.2  6/27/23 EKG: Normal sinus rhythm 77 bpm  Septal infarct ,age undetermined   Inferior infarct ,age undetermined    Medical History   Cancer Arthritis   Diabetes mellitus, type 2 Hypertension   Breast cancer    Anemia    Plan is general plus adductor canal block

## 2023-06-28 NOTE — PROGRESS NOTES
[facility]  [facility] Medicine  Progress Note    Patient Name: Sadie Martinez  MRN: [id number]  Patient Class: OP- Outpatient Recovery   Admission Date: 6/26/2023  Length of Stay: 0 days  Attending Physician: Indra Matos MD  Primary Care Provider: Indra Matos MD        Subjective:     Principal Problem:Localized osteoarthritis of right knee        HPI:  Thank you for consult:    Chief complaint:  Right knee pain.    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  Patient states she injured her right knee at work in 2017.  States has had discomfort with it since.  More than a year ago she had a meniscus tear in right knee requiring a knee scope.  Has had continued pain leading to her knee surgery.  Denies any significant pain to her left knee.     Review of system:  See above.  Wears glasses without recent change in vision.  States also has some chronic back issues which she states initiated at her work.  Denies any snoring and states sleep issues seemed to come from her back and chronic knee pain.  Review of systems otherwise.    Past medical history:  -hypertension greater than 15 years.  -diabetes mellitus type 2 treated with oral agents  -diagnosed 2020 with breast cancer status post left mastectomy.  No chemotherapy or radiation needed.  Followed by Dr. Warner.    Past surgical history:  Right knee scope  Right total knee replacement this admission  Previous left mastectomy in 2020  Hysterectomy with bilateral salpingo-oophorectomy  Low back surgery    Patient list Mobic as an allergy    Social history:  Lives by herself  Has children that live in the area that could possibly help her as needed  Would like to go to swing bed following this admission  No history of tobacco use  Drink alcohol only occasionally    Family history:  Mother had heart problems with heart attack starting her 60s    Health maintenance  issues:  Primary care provider is Melanie Watts NP  Had flu shot last fall  No prior Pneumovax but recommended she get wanted age 63  Had mammogram in April  Last Pap smear 3 years earlier  Scheduled for colonoscopy next year, states prior colonoscopy showed an area they were concerned about infection and they found no polyps but told her to repeat it next year          Overview/Hospital Course:  06/26 Not much pain. Ortho note seen with need to return to surgery with abnormal position prosthesis. Discussed with Dr Warner and will stop her Arimidex for two weeks to lower risk of DVT. Discussed with pt and she understands. BP and BS Ok. Correct electrolytes.        Past Medical History:   Diagnosis Date    Arthritis     Breast cancer     Left breast mastectomy 4/16/2020    Cancer     Diabetes mellitus, type 2     Hypertension        Past Surgical History:   Procedure Laterality Date    ARTHROPLASTY, KNEE, TOTAL, USING COMPUTER-ASSISTED NAVIGATION Right 6/26/2023    Procedure: ARTHROPLASTY, KNEE, TOTAL, USING COMPUTER-ASSISTED NAVIGATION;  Surgeon: Indra Matos MD;  Location: [facility] OR;  Service: Orthopedics;  Laterality: Right;    ARTHROSCOPIC CHONDROPLASTY OF KNEE JOINT Right 9/8/2021    Procedure: ARTHROSCOPY, KNEE, WITH CHONDROPLASTY;  Surgeon: Indra Matos MD;  Location: [facility] OR;  Service: Orthopedics;  Laterality: Right;    BACK SURGERY      CERVICAL BIOPSY  W/ LOOP ELECTRODE EXCISION      HYSTERECTOMY      KNEE ARTHROSCOPY W/ MENISCECTOMY Right 9/8/2021    Procedure: ARTHROSCOPY, KNEE, WITH MENISCECTOMY;  Surgeon: Indra Matos MD;  Location: [facility] OR;  Service: Orthopedics;  Laterality: Right;    KNEE ARTHROSCOPY W/ PLICA EXCISION Right 9/8/2021    Procedure: EXCISION, PLICA, KNEE, ARTHROSCOPIC;  Surgeon: Indra Matos MD;  Location: [facility] OR;  Service: Orthopedics;  Laterality: Right;    MASTECTOMY, PARTIAL Left     TUBAL LIGATION         Review of patient's  allergies indicates:   Allergen Reactions    Mobic [meloxicam] Swelling       No current facility-administered medications on file prior to encounter.     Current Outpatient Medications on File Prior to Encounter   Medication Sig    amLODIPine (NORVASC) 5 MG tablet Take 5 mg by mouth once daily.    anastrozole (ARIMIDEX) 1 mg Tab Take 1 mg by mouth once daily.    calcium carbonate (OS-WILLIAM) 600 mg calcium (1,500 mg) Tab Take 600 mg by mouth once daily.    cyclobenzaprine (FLEXERIL) 10 MG tablet TAKE 1 TABLET BY MOUTH AT BEDTIME AS NEEDED FOR MUSCLE PAIN    gabapentin (NEURONTIN) 300 MG capsule Take 300 mg by mouth 3 (three) times daily.    glipiZIDE (GLUCOTROL) 10 MG tablet TAKE 1 TABLET BY MOUTH EVERY DAY BEFORE A MEAL    hydroCHLOROthiazide (HYDRODIURIL) 25 MG tablet Take 25 mg by mouth once daily.    metFORMIN (GLUCOPHAGE) 1000 MG tablet Take 1,000 mg by mouth 2 (two) times daily with meals.    rosuvastatin (CRESTOR) 10 MG tablet Take 10 mg by mouth once daily.    [DISCONTINUED] HYDROcodone-acetaminophen (NORCO) 5-325 mg per tablet Take 1 tablet by mouth every 6 (six) hours as needed for Pain. (Patient taking differently: Take 2 tablets by mouth every 6 (six) hours as needed for Pain.)    ondansetron (ZOFRAN-ODT) 4 MG TbDL Take 1 tablet (4 mg total) by mouth every 6 (six) hours as needed (nausea).    [DISCONTINUED] HYDROcodone-acetaminophen (NORCO) 7.5-325 mg per tablet Take 1 tablet by mouth 3 (three) times daily.     Family History       Problem Relation (Age of Onset)    Dementia Father    Diabetes Mother    Heart attack Mother    Hypertension Mother    Pancreatic cancer Maternal Uncle    Stomach cancer Maternal Uncle    Stroke Father          Tobacco Use    Smoking status: Never    Smokeless tobacco: Never   Substance and Sexual Activity    Alcohol use: Not Currently    Drug use: Never    Sexual activity: Not Currently     Partners: Male     Review of Systems   Constitutional:  Negative for  appetite change, fatigue and fever.   HENT:  Negative for congestion, hearing loss and trouble swallowing.         Wears glasses   Respiratory:  Negative for chest tightness, shortness of breath and wheezing.    Cardiovascular:  Negative for chest pain and palpitations.   Gastrointestinal:  Negative for abdominal pain, constipation and nausea.   Genitourinary:  Negative for difficulty urinating and dysuria.   Musculoskeletal:  Positive for arthralgias and back pain. Negative for neck stiffness.   Skin:  Negative for pallor and rash.   Neurological:  Negative for dizziness, speech difficulty and headaches.   Psychiatric/Behavioral:  Negative for confusion and suicidal ideas.    Objective:     Vital Signs (Most Recent):  Temp: 98 °F (36.7 °C) (06/27/23 1911)  Pulse: 86 (06/27/23 1911)  Resp: 16 (06/27/23 1911)  BP: 138/71 (06/27/23 1911)  SpO2: 99 % (06/27/23 1911) Vital Signs (24h Range):  Temp:  [97.1 °F (36.2 °C)-98.9 °F (37.2 °C)] 98 °F (36.7 °C)  Pulse:  [75-96] 86  Resp:  [16-18] 16  SpO2:  [98 %-100 %] 99 %  BP: (111-138)/(58-76) 138/71     Weight: 74.4 kg (164 lb)  Body mass index is 30 kg/m².     Physical Exam  Vitals reviewed.   Constitutional:       General: She is awake. She is not in acute distress.     Appearance: She is well-developed. She is not toxic-appearing.   HENT:      Head: Normocephalic.      Nose: Nose normal.      Mouth/Throat:      Pharynx: Oropharynx is clear.   Eyes:      Extraocular Movements: Extraocular movements intact.      Pupils: Pupils are equal, round, and reactive to light.   Neck:      Thyroid: No thyroid mass.      Vascular: No carotid bruit.   Cardiovascular:      Rate and Rhythm: Normal rate and regular rhythm.      Pulses: Normal pulses.      Heart sounds: Normal heart sounds. No murmur heard.  Pulmonary:      Effort: Pulmonary effort is normal.      Breath sounds: Normal breath sounds and air entry. No wheezing.   Abdominal:      General: Bowel sounds are normal. There is  no distension.      Palpations: Abdomen is soft.      Tenderness: There is no abdominal tenderness.   Musculoskeletal:      Cervical back: Neck supple. No rigidity.      Comments: Changes consistent with right knee surgery this admission   Skin:     General: Skin is warm.      Coloration: Skin is not jaundiced.      Findings: No lesion.   Neurological:      General: No focal deficit present.      Mental Status: She is alert and oriented to person, place, and time.      Cranial Nerves: No cranial nerve deficit.   Psychiatric:         Attention and Perception: Attention normal.         Mood and Affect: Mood normal.         Behavior: Behavior normal. Behavior is cooperative.         Thought Content: Thought content normal.         Cognition and Memory: Cognition normal.        Significant Labs: All pertinent labs within the past 24 hours have been reviewed.  BMP:   Recent Labs   Lab 06/27/23  0336   *      K 3.4*      CO2 27   BUN 14   CREATININE 1.30*   CALCIUM 8.2*   MG 1.3*     CBC:   Recent Labs   Lab 06/27/23  0336   WBC 11.22*   HGB 10.1*   HCT 30.8*        CMP:   Recent Labs   Lab 06/27/23  0336      K 3.4*      CO2 27   *   BUN 14   CREATININE 1.30*   CALCIUM 8.2*   PROT 6.0*   ALBUMIN 3.0*   BILITOT 0.3   ALKPHOS 54   AST 16   ALT 17   ANIONGAP 12       Significant Imaging: I have reviewed all pertinent imaging results/findings within the past 24 hours.      Intake/Output - Last 3 Shifts         06/26 0700  06/27 0659 06/27 0700  06/28 0659    P.O. 120     I.V. (mL/kg) 200 (2.7)     IV Piggyback 250     Total Intake(mL/kg) 570 (7.7)     Net +570           Urine Occurrence 3 x 1 x          Microbiology Results (last 7 days)       ** No results found for the last 168 hours. **                Assessment/Plan:      * Localized osteoarthritis of right knee    Knee replacement surgery 06/26    History of left breast cancer    Status post mastectomy in 2020  No radiation  or chemotherapy required  Followed by Dr. Warner    Type 2 diabetes mellitus without complication, without long-term current use of insulin  Patient's FSGs are controlled on current medication regimen.  Last A1c reviewed- No results found for: LABA1C, HGBA1C  Most recent fingerstick glucose reviewed- No results for input(s): POCTGLUCOSE in the last 24 hours.  Current correctional scale  Medium  Maintain anti-hyperglycemic dose as follows-   Antihyperglycemics (From admission, onward)    Start     Stop Route Frequency Ordered    06/27/23 0745  glipiZIDE tablet 10 mg         -- Oral With breakfast 06/26/23 1431    06/26/23 1715  metFORMIN tablet 1,000 mg         -- Oral 2 times daily with meals 06/26/23 1431        Hold Oral hypoglycemics while patient is in the hospital.    Systolic hypertension  Continue with home blood pressure medication      VTE Risk Mitigation (From admission, onward)         Ordered     IP VTE LOW RISK PATIENT  Once         06/27/23 0811     Place sequential compression device  Until discontinued         06/27/23 0811     Place SONI hose  Until discontinued         06/26/23 1431     Place sequential compression device  Until discontinued         06/26/23 1431                Discharge Planning   GERRI: 6/27/2023     Code Status: Prior   Is the patient medically ready for discharge?:     Reason for patient still in hospital (select all that apply): Laboratory test, Treatment and Imaging  Discharge Plan A: Rehab                  Aly Cardenas MD  Department of Hospital Medicine   [facility]

## 2023-06-28 NOTE — PT/OT/SLP PROGRESS
Physical Therapy Treatment    Patient Name:  Sadie Martinez   MRN:  [id number]    Recommendations:     Discharge Recommendations: rehabilitation facility, home health PT  Discharge Equipment Recommendations: none  Barriers to discharge:  ongoing medical care    Assessment:     Sadie Martinez is a 58 y.o. female admitted with a medical diagnosis of Localized osteoarthritis of right knee.  She presents with the following impairments/functional limitations: weakness, impaired endurance, impaired functional mobility, gait instability, decreased lower extremity function, pain, decreased ROM, orthopedic precautions. Pt underwent surgical manipulation/revision of R knee this AM. Pt demo good mobility in spite of pain. Pt to wear R knee immobilizer for 1wk, WBAT.    Rehab Prognosis: Good; patient would benefit from acute skilled PT services to address these deficits and reach maximum level of function.    Recent Surgery: Procedure(s) (LRB):  MANIPULATION, KNEE (Right) Day of Surgery    Plan:     During this hospitalization, patient to be seen BID (5x/wk; daily 2x/wk) to address the identified rehab impairments via gait training, therapeutic activities, therapeutic exercises and progress toward the following goals:    Plan of Care Expires:  07/26/23    Subjective     Chief Complaint: R TKA  Patient/Family Comments/goals: agreeable to PT  Pain/Comfort:  Pain Rating 1: 8/10  Location - Side 1: Right  Location 1: knee  Pain Addressed 1: Nurse notified      Objective:     Communicated with DAJA Painter LPN prior to session.  Patient found supine with blood pressure cuff, pulse ox (continuous), peripheral IV, knee immobilizer, SCD upon PT entry to room.     General Precautions: Standard, fall  Orthopedic Precautions: RLE weight bearing as tolerated  Braces: Knee immobilizer  Respiratory Status: Room air     Functional Mobility:  Bed Mobility:     Scooting: minimum assistance  Supine to Sit: minimum assistance  Sit to Supine: minimum  assistance  Transfers:     Sit to Stand:  contact guard assistance and minimum assistance with rolling walker  Gait: 40ft, R knee immobilizer in place, slow debra, short stride  Balance: Fair in stance       AM-PAC 6 CLICK MOBILITY  Turning over in bed (including adjusting bedclothes, sheets and blankets)?: 3  Sitting down on and standing up from a chair with arms (e.g., wheelchair, bedside commode, etc.): 3  Moving from lying on back to sitting on the side of the bed?: 3  Moving to and from a bed to a chair (including a wheelchair)?: 3  Need to walk in hospital room?: 3  Climbing 3-5 steps with a railing?: 1  Basic Mobility Total Score: 16       Treatment & Education:  Bilateral lower extremity exercise x 10-15 reps: ankle pumps, Quad sets, glut sets, hip abduction/adduction, and straight leg raises with active assist ROM, verbal cues for sequencing and safety, and tactile cues     Patient left supine with all lines intact, call button in reach, and LPN notified..    GOALS:   Multidisciplinary Problems       Physical Therapy Goals          Problem: Physical Therapy    Goal Priority Disciplines Outcome Goal Variances Interventions   Physical Therapy Goal     PT, PT/OT Ongoing, Progressing     Description: Short Term Goals to be met by: 23    Patient will increase functional independence with mobility by performin. Supine to sit with Modified [place]  2. Sit to stand transfer with Modified [place]  3. Bed to chair transfer with Modified [place] using Rolling Walker, WBAT R LE with immobilizer  4. Gait  x 100 feet with Modified [place] using Rolling Walker, WBAT R LE with immobilizer  5. Lower extremity exercise program x30 reps per handout, with assistance as needed with immobilizer  6. Negotiate 3 steps with 1 rail with CGA with immobilizer    Long Term Goals to be met by: 23    Pt will regain full independent functional mobility to return to prior activities of daily living.                         Time Tracking:     PT Received On: 06/28/23  PT Start Time: 1530     PT Stop Time: 1554  PT Total Time (min): 24 min     Billable Minutes: Gait Training 10 and Therapeutic Exercise 14    Treatment Type: Evaluation  PT/PTA: PT           06/28/2023

## 2023-06-28 NOTE — ANESTHESIA PROCEDURE NOTES
Peripheral Block    Patient location during procedure: OR   Block not for primary anesthetic.  Reason for block: at surgeon's request and post-op pain management   Post-op Pain Location: right knee   Start time: 6/28/2023 7:35 AM  Timeout: 6/28/2023 7:35 AM   End time: 6/28/2023 7:35 AM    Staffing  Authorizing Provider: Bill Davey MD  Performing Provider: Kaushal Jackson MD    Preanesthetic Checklist  Completed: patient identified, IV checked, site marked, risks and benefits discussed, surgical consent, monitors and equipment checked, pre-op evaluation and timeout performed  Peripheral Block  Patient position: supine  Prep: ChloraPrep  Patient monitoring: heart rate, cardiac monitor, continuous pulse ox, continuous capnometry and frequent blood pressure checks  Block type: adductor canal  Laterality: right  Injection technique: single shot  Needle  Needle type: Stimuplex   Needle gauge: 20 G  Needle length: 4 in  Needle localization: ultrasound guidance   -ultrasound image captured on disc.  Assessment  Injection assessment: negative aspiration, negative parasthesia and local visualized surrounding nerve  Paresthesia pain: none  Heart rate change: no  Slow fractionated injection: yes  Pain Tolerance: comfortable throughout block and no complaints  Medications:    Medications: ROPIvacaine (NAROPIN) injection 0.75% - Perineural   30 mL - 6/28/2023 7:35:00 AM  dexAMETHasone sodium phos (PF) injection 10 mg/mL - Other   10 mg - 6/28/2023 7:35:00 AM

## 2023-06-28 NOTE — PLAN OF CARE
Will keep her immobilized in a knee brace for the next week.  Okay to weightbear as tolerated in full extension with a knee immobilizer on.

## 2023-06-28 NOTE — ANESTHESIA PROCEDURE NOTES
Intubation    Date/Time: 6/28/2023 7:30 AM  Performed by: Monty Garner CRNA  Authorized by: Bill Davey MD     Intubation:     Induction:  Intravenous    Intubated:  Postinduction    Mask Ventilation:  Easy mask    Attempts:  1    Attempted By:  CRNA    Difficult Airway Encountered?: No      Complications:  None    Airway Device:  Supraglottic airway/LMA    Airway Device Size:  4.0    Style/Cuff Inflation:  Cuffed (inflated to minimal occlusive pressure)    Placement Verified By:  Capnometry    Complicating Factors:  None    Findings Post-Intubation:  BS equal bilateral and atraumatic/condition of teeth unchanged

## 2023-06-28 NOTE — TRANSFER OF CARE
"Anesthesia Transfer of Care Note    Patient: Sadie Martinez    Procedure(s) Performed: Procedure(s) (LRB):  MANIPULATION, KNEE (Right)    Patient location: PACU    Anesthesia Type: general    Transport from OR: Transported from OR on room air with adequate spontaneous ventilation    Post pain: adequate analgesia    Post assessment: no apparent anesthetic complications and tolerated procedure well    Post vital signs: stable    Level of consciousness: responds to stimulation    Nausea/Vomiting: no nausea/vomiting    Complications: none    Transfer of care protocol was followedComments: Report Given to PACU rn VSS      Last vitals:   Visit Vitals  /72 (BP Location: Right arm, Patient Position: Lying)   Pulse (!) 112   Temp 36.4 °C (97.6 °F) (Oral)   Resp 19   Ht 5' 2" (1.575 m)   Wt 74.4 kg (164 lb)   SpO2 97%   Breastfeeding No   BMI 30.00 kg/m²     "

## 2023-06-28 NOTE — PLAN OF CARE
Rc'd call from oskar Ascencio is requesting a peer to peer to be completed by 2pm this day, secure chat sent to MD Burrell however he is SX this day, added his RN to chat. Will follow.   1415  Peer to peer set up fpr tomorrow at 11am with MD Matos, MD Nikki Leal will call MD ri=[facility] [[email or web address]] cell phone.

## 2023-06-28 NOTE — PT/OT/SLP PROGRESS
Occupational Therapy   Treatment    Name: Sadie Martinez  MRN: [id number]  Admitting Diagnosis:  Localized osteoarthritis of right knee  Day of Surgery    Recommendations:     Discharge Recommendations: nursing facility, skilled, rehabilitation facility  Discharge Equipment Recommendations:   (to be determined)  Barriers to discharge:  None    Assessment:     Sadie Martinez is a 58 y.o. female with a medical diagnosis of Localized osteoarthritis of right knee.  She presents with desire to use the bathroom. Performance deficits affecting function are impaired self care skills, impaired functional mobility, gait instability, decreased lower extremity function.     Rehab Prognosis:  Good; patient would benefit from acute skilled OT services to address these deficits and reach maximum level of function.       Plan:     Patient to be seen 5 x/week to address the above listed problems via self-care/home management, therapeutic activities, therapeutic exercises  Plan of Care Expires:    Plan of Care Reviewed with: patient    Subjective     Chief Complaint: (R) TKR and Revision  Patient/Family Comments/goals: Swingbed prior to d/c home  Pain/Comfort:       Objective:     Communicated with: SHAUNA Painter prior to session.  Patient found HOB elevated with knee immobilizer, peripheral IV upon OT entry to room.    General Precautions: Standard, fall    Orthopedic Precautions:RLE weight bearing as tolerated (with knee immobilizer x 2 weeks)  Braces: Knee immobilizer  Respiratory Status: Room air     Occupational Performance:     Bed Mobility:    Patient completed Rolling/Turning to Right with minimum assistance  Patient completed Supine to Sit with minimum assistance and with (R) LE management  Patient completed Sit to Supine with contact guard assistance     Functional Mobility/Transfers:  Patient completed Sit <> Stand Transfer with minimum assistance  with  gait belt to stand to RW   Patient completed Toilet Transfer Step  Transfer technique with contact guard assistance with  rolling walker and gait belt. Pt ambulated from bed to bathroom and back with RW  Functional Mobility: CGA with RW and gait belt    Activities of Daily Living:  Grooming: Pt stood at sink with SBA to wash hands    Toileting: stand by assistance        [facility] 6 Click ADL:      Treatment & Education:  Pt participated well wt tx. Pt declined exercises with focus on transferring to bathroom.    Patient left HOB elevated with all lines intact, call button in reach, and nurse notified    GOALS:   Multidisciplinary Problems       Occupational Therapy Goals          Problem: Occupational Therapy    Goal Priority Disciplines Outcome Interventions   Occupational Therapy Goal     OT, PT/OT Ongoing, Progressing    Description: STG:  Pt will perform grooming with setup  Pt will bathe with min a  Pt will perform UE dressing with (I)   Pt will perform LE dressing with I/Mod I  Pt will transfer bed/chair/bsc with (S)  Pt will perform standing task x 2 min with (S)   Pt will tolerate 15 minutes of tx without fatigue      LT.Restore to max I with self care and mobility.                         Time Tracking:     OT Date of Treatment: 23  OT Start Time:   OT Stop Time:   OT Total Time (min): 16 min    Billable Minutes:Self Care/Home Management 10    OT/MONSTER: OT          2023

## 2023-06-28 NOTE — OR NURSING
0758 Rec'd pt to PACU asleep with oral airway in place. VSS. No signs of distress noted. Right knee dressing C/D/I with immobilizer in place. Right pedal pulses 2+, cap refill less than 3 seconds. No needs at this time. Will continue to monitor.     0805 Oral airway removed, respirations even and unlabored. Reoriented to surroundings. Denies pain/needs at this time.     0813 Called and spoke with pt daughter. Update given and all questions answered.     0832 Out of PACU. VSS. No signs of bleeding/distress noted.     0840 Pt to room 462 awake and alert with no distress noted, respirations even and unlabored. No visitors at bedside. Bedside report given to A Paul RN. Right knee dressing C/D/I with immobilizer secure and intact, pedal pulses 2+, cap refill less than 3 seconds, able to wiggle toes on command. Denies pain/needs. /83, P 102, R 16, O2 100% RA, T 99.4 oral.   
1155-Pt rec'd to RR sedated, unresponsive to stimulation, stable condition, 6L O2 via simple fm, SaO2-100%, stable condition, suctioned PRN, will titrate O2 PRN, resp even et unlabored, IV fluids infusing, ace wrap dressing dry/intact with leg immobilizer on, + dorsalis pedis pulse , toes warm/dry to touch, SONI/SCD on left leg, will con't to monitor, safety measures in effect.    1205-X-ray done to right knee at bedside, blood sugar was 185.    1210-Pt awake but drowsy, responds to stimulation, placed on room air, SaO2-98%, able to wiggle toes, c/o slight numbness at present, will con't to monitor.    1219- at bedside to see pt.    1235-Pt awake and alert, SaO2-100%, denies any c/o pain, will con't to monitor.    1300-Holding in PACU, waiting on room to be cleaned.    1315-Pt awake and alert, denies any c/o pain, SaO2-99%, will con't to monitor.    1355-Pt awake and alert, talking with sta, on room air, SaO2-100%, denies any c/o pain, able to wiggle toes with c/o slight numbness, orders  to transfer to room 462.    1415-Pt care released to Kacey(RN), pt awake and alert, vss temp 98.1 oral, SaO2-100%, hr-101, resp-16, b/p 148/71.  
Clearance received to clinic from Melanie Watts's office with a page unable to read. Laurita in Dr. Burrell's office called for clarification. Verbal from Mac's office that missing page stated patient clear for surgery. Laurita asked for them to resend copy. Copy never received and clinic out until after surgery. I informed anesthesia of the situation and they were ok to proceed with clearance we have and verbal from clinic.  
The patient is a 4y Male complaining of fever.

## 2023-06-29 LAB
ANION GAP SERPL CALCULATED.3IONS-SCNC: 12 MMOL/L (ref 7–16)
BUN SERPL-MCNC: 10 MG/DL (ref 7–18)
BUN/CREAT SERPL: 8 (ref 6–20)
CALCIUM SERPL-MCNC: 9 MG/DL (ref 8.5–10.1)
CHLORIDE SERPL-SCNC: 101 MMOL/L (ref 98–107)
CO2 SERPL-SCNC: 30 MMOL/L (ref 21–32)
CREAT SERPL-MCNC: 1.18 MG/DL (ref 0.55–1.02)
EGFR (NO RACE VARIABLE) (RUSH/TITUS): 53 ML/MIN/1.73M2
GLUCOSE SERPL-MCNC: 143 MG/DL (ref 70–105)
GLUCOSE SERPL-MCNC: 217 MG/DL (ref 74–106)
GLUCOSE SERPL-MCNC: 226 MG/DL (ref 70–105)
GLUCOSE SERPL-MCNC: 289 MG/DL (ref 70–105)
GLUCOSE SERPL-MCNC: 55 MG/DL (ref 70–105)
MAGNESIUM SERPL-MCNC: 1.8 MG/DL (ref 1.7–2.3)
PHOSPHATE SERPL-MCNC: 4.6 MG/DL (ref 2.5–4.5)
POTASSIUM SERPL-SCNC: 3.5 MMOL/L (ref 3.5–5.1)
SODIUM SERPL-SCNC: 139 MMOL/L (ref 136–145)

## 2023-06-29 PROCEDURE — 99232 SBSQ HOSP IP/OBS MODERATE 35: CPT | Mod: ,,, | Performed by: STUDENT IN AN ORGANIZED HEALTH CARE EDUCATION/TRAINING PROGRAM

## 2023-06-29 PROCEDURE — 99232 PR SUBSEQUENT HOSPITAL CARE,LEVL II: ICD-10-PCS | Mod: ,,, | Performed by: STUDENT IN AN ORGANIZED HEALTH CARE EDUCATION/TRAINING PROGRAM

## 2023-06-29 PROCEDURE — 97116 GAIT TRAINING THERAPY: CPT | Mod: CQ

## 2023-06-29 PROCEDURE — 83735 ASSAY OF MAGNESIUM: CPT | Performed by: STUDENT IN AN ORGANIZED HEALTH CARE EDUCATION/TRAINING PROGRAM

## 2023-06-29 PROCEDURE — 94761 N-INVAS EAR/PLS OXIMETRY MLT: CPT

## 2023-06-29 PROCEDURE — 84100 ASSAY OF PHOSPHORUS: CPT | Performed by: STUDENT IN AN ORGANIZED HEALTH CARE EDUCATION/TRAINING PROGRAM

## 2023-06-29 PROCEDURE — 82962 GLUCOSE BLOOD TEST: CPT | Mod: 91

## 2023-06-29 PROCEDURE — 25000003 PHARM REV CODE 250: Performed by: ORTHOPAEDIC SURGERY

## 2023-06-29 PROCEDURE — 80048 BASIC METABOLIC PNL TOTAL CA: CPT | Performed by: STUDENT IN AN ORGANIZED HEALTH CARE EDUCATION/TRAINING PROGRAM

## 2023-06-29 PROCEDURE — 63600175 PHARM REV CODE 636 W HCPCS: Mod: GZ | Performed by: ORTHOPAEDIC SURGERY

## 2023-06-29 PROCEDURE — 97110 THERAPEUTIC EXERCISES: CPT | Mod: CQ

## 2023-06-29 PROCEDURE — 97535 SELF CARE MNGMENT TRAINING: CPT

## 2023-06-29 RX ADMIN — DOCUSATE SODIUM 100 MG: 100 CAPSULE, LIQUID FILLED ORAL at 09:06

## 2023-06-29 RX ADMIN — OXYCODONE HYDROCHLORIDE 10 MG: 5 TABLET ORAL at 02:06

## 2023-06-29 RX ADMIN — ASPIRIN 81 MG 324 MG: 81 TABLET ORAL at 09:06

## 2023-06-29 RX ADMIN — INSULIN ASPART 6 UNITS: 100 INJECTION, SOLUTION INTRAVENOUS; SUBCUTANEOUS at 11:06

## 2023-06-29 RX ADMIN — MUPIROCIN 1 G: 20 OINTMENT TOPICAL at 09:06

## 2023-06-29 RX ADMIN — OXYCODONE HYDROCHLORIDE 10 MG: 5 TABLET ORAL at 07:06

## 2023-06-29 RX ADMIN — METFORMIN HYDROCHLORIDE 1000 MG: 500 TABLET ORAL at 09:06

## 2023-06-29 RX ADMIN — AMLODIPINE BESYLATE 5 MG: 5 TABLET ORAL at 09:06

## 2023-06-29 RX ADMIN — PREGABALIN 75 MG: 75 CAPSULE ORAL at 09:06

## 2023-06-29 RX ADMIN — GLIPIZIDE 5 MG: 5 TABLET ORAL at 09:06

## 2023-06-29 RX ADMIN — OXYCODONE HYDROCHLORIDE 10 MG: 5 TABLET ORAL at 03:06

## 2023-06-29 RX ADMIN — OXYCODONE HYDROCHLORIDE 10 MG: 5 TABLET ORAL at 09:06

## 2023-06-29 RX ADMIN — ATORVASTATIN CALCIUM 40 MG: 40 TABLET, FILM COATED ORAL at 09:06

## 2023-06-29 RX ADMIN — FAMOTIDINE 20 MG: 20 TABLET, FILM COATED ORAL at 09:06

## 2023-06-29 RX ADMIN — HYDROCHLOROTHIAZIDE 25 MG: 25 TABLET ORAL at 09:06

## 2023-06-29 RX ADMIN — POLYETHYLENE GLYCOL 3350 17 G: 17 POWDER, FOR SOLUTION ORAL at 09:06

## 2023-06-29 NOTE — PROGRESS NOTES
[facility]  [facility] Medicine  Progress Note    Patient Name: Sadie Martinez  MRN: [id number]  Patient Class: OP- Outpatient Recovery   Admission Date: 6/26/2023  Length of Stay: 0 days  Attending Physician: Indra Matos MD  Primary Care Provider: Indra Matos MD        Subjective:     Principal Problem:Localized osteoarthritis of right knee        HPI:  Thank you for consult:    Chief complaint:  Right knee pain.    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  Patient states she injured her right knee at work in 2017.  States has had discomfort with it since.  More than a year ago she had a meniscus tear in right knee requiring a knee scope.  Has had continued pain leading to her knee surgery.  Denies any significant pain to her left knee.     Review of system:  See above.  Wears glasses without recent change in vision.  States also has some chronic back issues which she states initiated at her work.  Denies any snoring and states sleep issues seemed to come from her back and chronic knee pain.  Review of systems otherwise.    Past medical history:  -hypertension greater than 15 years.  -diabetes mellitus type 2 treated with oral agents  -diagnosed 2020 with breast cancer status post left mastectomy.  No chemotherapy or radiation needed.  Followed by Dr. Warner.    Past surgical history:  Right knee scope  Right total knee replacement this admission  Previous left mastectomy in 2020  Hysterectomy with bilateral salpingo-oophorectomy  Low back surgery    Patient list Mobic as an allergy    Social history:  Lives by herself  Has children that live in the area that could possibly help her as needed  Would like to go to [facility] following this admission  No history of tobacco use  Drink alcohol only occasionally    Family history:  Mother had heart problems with heart attack starting her 60s    Health maintenance  issues:  Primary care provider is Melanie Watts NP  Had flu shot last fall  No prior Pneumovax but recommended she get wanted age 63  Had mammogram in April  Last Pap smear 3 years earlier  Scheduled for colonoscopy next year, states prior colonoscopy showed an area they were concerned about infection and they found no polyps but told her to repeat it next year          Overview/Hospital Course:  06/26 Not much pain. Ortho note seen with need to return to surgery with abnormal position prosthesis. Discussed with Dr Warner and will stop her Arimidex for two weeks to lower risk of DVT. Discussed with pt and she understands. BP and BS Ok. Correct electrolytes.   06/27 Seen prior to surgery in good spirits. Seems medically stable to go. Plan rehab at [facility]. BP and BS good.      Past Medical History:   Diagnosis Date    Arthritis     Breast cancer     Left breast mastectomy 4/16/2020    Cancer     Diabetes mellitus, type 2     Hypertension        Past Surgical History:   Procedure Laterality Date    ARTHROPLASTY, KNEE, TOTAL, USING COMPUTER-ASSISTED NAVIGATION Right 6/26/2023    Procedure: ARTHROPLASTY, KNEE, TOTAL, USING COMPUTER-ASSISTED NAVIGATION;  Surgeon: Indra Matos MD;  Location: [facility] OR;  Service: Orthopedics;  Laterality: Right;    ARTHROSCOPIC CHONDROPLASTY OF KNEE JOINT Right 9/8/2021    Procedure: ARTHROSCOPY, KNEE, WITH CHONDROPLASTY;  Surgeon: Indra Matos MD;  Location: [facility] OR;  Service: Orthopedics;  Laterality: Right;    BACK SURGERY      CERVICAL BIOPSY  W/ LOOP ELECTRODE EXCISION      HYSTERECTOMY      KNEE ARTHROSCOPY W/ MENISCECTOMY Right 9/8/2021    Procedure: ARTHROSCOPY, KNEE, WITH MENISCECTOMY;  Surgeon: Indra Matos MD;  Location: [facility] OR;  Service: Orthopedics;  Laterality: Right;    KNEE ARTHROSCOPY W/ PLICA EXCISION Right 9/8/2021    Procedure: EXCISION, PLICA, KNEE, ARTHROSCOPIC;  Surgeon: Indra Matos MD;  Location: [facility] OR;  Service:  Orthopedics;  Laterality: Right;    MASTECTOMY, PARTIAL Left     TUBAL LIGATION         Review of patient's allergies indicates:   Allergen Reactions    Mobic [meloxicam] Swelling       No current facility-administered medications on file prior to encounter.     Current Outpatient Medications on File Prior to Encounter   Medication Sig    amLODIPine (NORVASC) 5 MG tablet Take 5 mg by mouth once daily.    anastrozole (ARIMIDEX) 1 mg Tab Take 1 mg by mouth once daily.    calcium carbonate (OS-WILLIAM) 600 mg calcium (1,500 mg) Tab Take 600 mg by mouth once daily.    cyclobenzaprine (FLEXERIL) 10 MG tablet TAKE 1 TABLET BY MOUTH AT BEDTIME AS NEEDED FOR MUSCLE PAIN    gabapentin (NEURONTIN) 300 MG capsule Take 300 mg by mouth 3 (three) times daily.    glipiZIDE (GLUCOTROL) 10 MG tablet TAKE 1 TABLET BY MOUTH EVERY DAY BEFORE A MEAL    hydroCHLOROthiazide (HYDRODIURIL) 25 MG tablet Take 25 mg by mouth once daily.    metFORMIN (GLUCOPHAGE) 1000 MG tablet Take 1,000 mg by mouth 2 (two) times daily with meals.    rosuvastatin (CRESTOR) 10 MG tablet Take 10 mg by mouth once daily.    ondansetron (ZOFRAN-ODT) 4 MG TbDL Take 1 tablet (4 mg total) by mouth every 6 (six) hours as needed (nausea).     Family History       Problem Relation (Age of Onset)    Dementia Father    Diabetes Mother    Heart attack Mother    Hypertension Mother    Pancreatic cancer Maternal Uncle    Stomach cancer Maternal Uncle    Stroke Father          Tobacco Use    Smoking status: Never    Smokeless tobacco: Never   Substance and Sexual Activity    Alcohol use: Not Currently    Drug use: Never    Sexual activity: Not Currently     Partners: Male     Review of Systems   Constitutional:  Negative for appetite change, fatigue and fever.   HENT:  Negative for congestion, hearing loss and trouble swallowing.         Wears glasses   Respiratory:  Negative for chest tightness, shortness of breath and wheezing.    Cardiovascular:  Negative for  chest pain and palpitations.   Gastrointestinal:  Negative for abdominal pain, constipation and nausea.   Genitourinary:  Negative for difficulty urinating and dysuria.   Musculoskeletal:  Positive for arthralgias and back pain. Negative for neck stiffness.   Skin:  Negative for pallor and rash.   Neurological:  Negative for dizziness, speech difficulty and headaches.   Psychiatric/Behavioral:  Negative for confusion and suicidal ideas.    Objective:     Vital Signs (Most Recent):  Temp: 97.5 °F (36.4 °C) (06/28/23 1920)  Pulse: 105 (06/28/23 1920)  Resp: 18 (06/28/23 1920)  BP: (!) 140/60 (06/28/23 1920)  SpO2: 97 % (06/28/23 2022) Vital Signs (24h Range):  Temp:  [97.5 °F (36.4 °C)-100 °F (37.8 °C)] 97.5 °F (36.4 °C)  Pulse:  [] 105  Resp:  [16-20] 18  SpO2:  [95 %-100 %] 97 %  BP: (114-151)/(60-87) 140/60     Weight: 74.4 kg (164 lb)  Body mass index is 30 kg/m².     Physical Exam  Vitals reviewed.   Constitutional:       General: She is awake. She is not in acute distress.     Appearance: She is well-developed. She is not toxic-appearing.   HENT:      Head: Normocephalic.      Nose: Nose normal.      Mouth/Throat:      Pharynx: Oropharynx is clear.   Eyes:      Extraocular Movements: Extraocular movements intact.      Pupils: Pupils are equal, round, and reactive to light.   Neck:      Thyroid: No thyroid mass.      Vascular: No carotid bruit.   Cardiovascular:      Rate and Rhythm: Normal rate and regular rhythm.      Pulses: Normal pulses.      Heart sounds: Normal heart sounds. No murmur heard.  Pulmonary:      Effort: Pulmonary effort is normal.      Breath sounds: Normal breath sounds and air entry. No wheezing.   Abdominal:      General: Bowel sounds are normal. There is no distension.      Palpations: Abdomen is soft.      Tenderness: There is no abdominal tenderness.   Musculoskeletal:      Cervical back: Neck supple. No rigidity.      Comments: Changes consistent with right knee surgery this  admission   Skin:     General: Skin is warm.      Coloration: Skin is not jaundiced.      Findings: No lesion.   Neurological:      General: No focal deficit present.      Mental Status: She is alert and oriented to person, place, and time.      Cranial Nerves: No cranial nerve deficit.   Psychiatric:         Attention and Perception: Attention normal.         Mood and Affect: Mood normal.         Behavior: Behavior normal. Behavior is cooperative.         Thought Content: Thought content normal.         Cognition and Memory: Cognition normal.        Significant Labs: All pertinent labs within the past 24 hours have been reviewed.  BMP:   Recent Labs   Lab 06/27/23  0336   *      K 3.4*      CO2 27   BUN 14   CREATININE 1.30*   CALCIUM 8.2*   MG 1.3*       CBC:   Recent Labs   Lab 06/27/23 0336   WBC 11.22*   HGB 10.1*   HCT 30.8*          CMP:   Recent Labs   Lab 06/27/23 0336      K 3.4*      CO2 27   *   BUN 14   CREATININE 1.30*   CALCIUM 8.2*   PROT 6.0*   ALBUMIN 3.0*   BILITOT 0.3   ALKPHOS 54   AST 16   ALT 17   ANIONGAP 12         Significant Imaging: I have reviewed all pertinent imaging results/findings within the past 24 hours.      Intake/Output - Last 3 Shifts         06/27 0700  06/28 0659 06/28 0700  06/29 0659    P.O.      I.V. (mL/kg)  25 (0.3)    IV Piggyback      Total Intake(mL/kg)  25 (0.3)    Net  +25          Urine Occurrence 2 x 2 x          Microbiology Results (last 7 days)       ** No results found for the last 168 hours. **                Assessment/Plan:      * Localized osteoarthritis of right knee    Knee replacement surgery 06/26    History of left breast cancer    Status post mastectomy in 2020  No radiation or chemotherapy required  Followed by Dr. Warner    06/26 Discussed with Dr Warner and pt will hold Armidex for 2 weeks secondary risk DVT. Discussed with pt.    Type 2 diabetes mellitus without complication, without long-term  current use of insulin  Patient's FSGs are controlled on current medication regimen.  Last A1c reviewed- No results found for: LABA1C, HGBA1C  Most recent fingerstick glucose reviewed- No results for input(s): POCTGLUCOSE in the last 24 hours.  Current correctional scale  Medium  Maintain anti-hyperglycemic dose as follows-   Antihyperglycemics (From admission, onward)    Start     Stop Route Frequency Ordered    06/27/23 0745  glipiZIDE tablet 10 mg         -- Oral With breakfast 06/26/23 1431    06/26/23 1715  metFORMIN tablet 1,000 mg         -- Oral 2 times daily with meals 06/26/23 1431        Hold Oral hypoglycemics while patient is in the hospital.    Systolic hypertension  Continue with home blood pressure medication      VTE Risk Mitigation (From admission, onward)         Ordered     IP VTE LOW RISK PATIENT  Once         06/27/23 0811     Place sequential compression device  Until discontinued         06/27/23 0811     Place SONI hose  Until discontinued         06/26/23 1431     Place sequential compression device  Until discontinued         06/26/23 1431                Discharge Planning   GERRI: 6/27/2023     Code Status: Prior   Is the patient medically ready for discharge?:     Reason for patient still in hospital (select all that apply): Laboratory test, Treatment and Imaging  Discharge Plan A: Rehab                  Aly Cardenas MD  Department of Hospital Medicine   [facility]

## 2023-06-29 NOTE — PLAN OF CARE
Spoke with [facility] pt was denied for in pt rehab, however approved for SNF/SWB, spoke with aziza at [facility] and they will submit through [facility]. Will check with pt prefers.   Choice for [facility] sent referral now.

## 2023-06-29 NOTE — PT/OT/SLP PROGRESS
Occupational Therapy   Treatment    Name: Sadie Martinez  MRN: [id number]  Admitting Diagnosis:  Localized osteoarthritis of right knee  1 Day Post-Op    Recommendations:     Discharge Recommendations: nursing facility, skilled, rehabilitation facility  Discharge Equipment Recommendations:   (to be determined)  Barriers to discharge:  None    Assessment:     Sadie Martinez is a 58 y.o. female with a medical diagnosis of Localized osteoarthritis of right knee.  She presents with alert and working on her bath upon OT arrival. Performance deficits affecting function are impaired balance, impaired endurance, impaired functional mobility, decreased ROM, pain, orthopedic precautions.     Rehab Prognosis:  Good; patient would benefit from acute skilled OT services to address these deficits and reach maximum level of function.       Plan:     Patient to be seen 5 x/week to address the above listed problems via self-care/home management, therapeutic activities, therapeutic exercises  Plan of Care Expires:    Plan of Care Reviewed with: patient    Subjective     Chief Complaint: Pt c/o (R) knee pain post op (R) TKR day 2  post op day 1 knee manipulation due to mal alignment  Patient/Family Comments/goals: Pt wants to go to inpt rehab or swing bed prior to returning home  Pain/Comfort:  Pain Rating 1: 6/10  Location - Side 1: Right  Location 1: knee  Pain Addressed 1: Nurse notified  Pain Rating Post-Intervention 1: 6/10    Objective:     Communicated with: SHAUNA Painter prior to session.  Patient found up in chair with knee immobilizer, peripheral IV upon OT entry to room.    General Precautions: Standard, fall    Orthopedic Precautions:RLE weight bearing as tolerated (with knee immobilizer x 2 weeks)  Braces: Knee immobilizer  Respiratory Status: Room air     Occupational Performance:     Bed Mobility:    NT    Functional Mobility/Transfers:  Patient completed Sit <> Stand Transfer with independence  with  rolling walker    Patient completed Toilet Transfer Step Transfer technique with stand by assistance with  rolling walker and bedside commode  Functional Mobility: Pt performed mobility with RW and SBA    Activities of Daily Living:  Grooming: independence with access to supplies to brush her teeth and style hair  Bathing: modified independence after setup  Upper Body Dressing: independence with hospital gown  Lower Body Dressing: stand by assistance to use reacher to doff (L) sock and sockaid to don (R) sock  Toileting: modified independence with BSC      [facility] 6 Click ADL: 23    Treatment & Education:  P educated on using reacher to assist with LE dressing, donning (R) LE first and doffing (R) LE last. Pt was also educated on using sockaid to don socks    Patient left up in chair with call button in reach and PTA Pepper Centeno present    GOALS:   Multidisciplinary Problems       Occupational Therapy Goals          Problem: Occupational Therapy    Goal Priority Disciplines Outcome Interventions   Occupational Therapy Goal     OT, PT/OT Ongoing, Progressing    Description: STG:  Pt will perform grooming with setup  Pt will bathe with min a  Pt will perform UE dressing with (I)   Pt will perform LE dressing with I/Mod I  Pt will transfer bed/chair/bsc with (S)  Pt will perform standing task x 2 min with (S)   Pt will tolerate 15 minutes of tx without fatigue      LT.Restore to max I with self care and mobility.                         Time Tracking:     OT Date of Treatment: 23  OT Start Time: 848  OT Stop Time: 912  OT Total Time (min): 24 min    Billable Minutes:Self Care/Home Management 24 min    OT/MONSTER: OT          2023

## 2023-06-29 NOTE — PROGRESS NOTES
[facility]  [facility] Medicine  Progress Note    Patient Name: Sadie Martinez  MRN: [id number]  Patient Class: OP- Outpatient Recovery   Admission Date: 6/26/2023  Length of Stay: 0 days  Attending Physician: Indra Matos MD  Primary Care Provider: Indra Matos MD        Subjective:     Principal Problem:Localized osteoarthritis of right knee        HPI:  Thank you for consult:    Chief complaint:  Right knee pain.    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  Patient states she injured her right knee at work in 2017.  States has had discomfort with it since.  More than a year ago she had a meniscus tear in right knee requiring a knee scope.  Has had continued pain leading to her knee surgery.  Denies any significant pain to her left knee.     Review of system:  See above.  Wears glasses without recent change in vision.  States also has some chronic back issues which she states initiated at her work.  Denies any snoring and states sleep issues seemed to come from her back and chronic knee pain.  Review of systems otherwise.    Past medical history:  -hypertension greater than 15 years.  -diabetes mellitus type 2 treated with oral agents  -diagnosed 2020 with breast cancer status post left mastectomy.  No chemotherapy or radiation needed.  Followed by Dr. Warner.    Past surgical history:  Right knee scope  Right total knee replacement this admission  Previous left mastectomy in 2020  Hysterectomy with bilateral salpingo-oophorectomy  Low back surgery    Patient list Mobic as an allergy    Social history:  Lives by herself  Has children that live in the area that could possibly help her as needed  Would like to go to [facility] bed following this admission  No history of tobacco use  Drink alcohol only occasionally    Family history:  Mother had heart problems with heart attack starting her 60s    Health maintenance  issues:  Primary care provider is Melanie Watts NP  Had flu shot last fall  No prior Pneumovax but recommended she get wanted age 63  Had mammogram in April  Last Pap smear 3 years earlier  Scheduled for colonoscopy next year, states prior colonoscopy showed an area they were concerned about infection and they found no polyps but told her to repeat it next year          Overview/Hospital Course:  06/26 Not much pain. Ortho note seen with need to return to surgery with abnormal position prosthesis. Discussed with Dr Warner and will stop her Arimidex for two weeks to lower risk of DVT. Discussed with pt and she understands. BP and BS Ok. Correct electrolytes.   06/27 Seen prior to surgery in good spirits. Seems medically stable to go. Plan rehab at [facility]. BP and BS good.      Interval History: naeo    Review of Systems   Constitutional:  Negative for chills, fatigue, fever and unexpected weight change.   HENT:  Negative for congestion, mouth sores and sore throat.    Eyes:  Negative for photophobia and visual disturbance.   Respiratory:  Negative for cough, chest tightness, shortness of breath and wheezing.    Cardiovascular:  Negative for chest pain, palpitations and leg swelling.   Gastrointestinal:  Negative for abdominal pain, diarrhea, nausea and vomiting.   Endocrine: Negative for cold intolerance and heat intolerance.   Genitourinary:  Negative for difficulty urinating, dysuria, frequency and urgency.   Musculoskeletal:  Positive for arthralgias. Negative for back pain and myalgias.   Skin:  Negative for pallor and rash.   Neurological:  Negative for tremors, seizures, syncope, weakness, numbness and headaches.   Hematological:  Does not bruise/bleed easily.   Psychiatric/Behavioral:  Negative for agitation, confusion, hallucinations and suicidal ideas.    Objective:     Vital Signs (Most Recent):  Temp: 98 °F (36.7 °C) (06/29/23 0714)  Pulse: 93 (06/29/23 0714)  Resp: 17 (06/29/23 0950)  BP: (!) 107/50 (06/29/23  0714)  SpO2: 99 % (06/29/23 0714) Vital Signs (24h Range):  Temp:  [97.1 °F (36.2 °C)-99.7 °F (37.6 °C)] 98 °F (36.7 °C)  Pulse:  [] 93  Resp:  [16-18] 17  SpO2:  [96 %-100 %] 99 %  BP: (107-140)/(50-82) 107/50     Weight: 74.4 kg (164 lb)  Body mass index is 30 kg/m².  No intake or output data in the 24 hours ending 06/29/23 1110      Physical Exam  Vitals reviewed.   Constitutional:       General: She is awake. She is not in acute distress.     Appearance: She is well-developed. She is not toxic-appearing.   HENT:      Head: Normocephalic.      Nose: Nose normal.      Mouth/Throat:      Pharynx: Oropharynx is clear.   Eyes:      Extraocular Movements: Extraocular movements intact.      Pupils: Pupils are equal, round, and reactive to light.   Neck:      Thyroid: No thyroid mass.      Vascular: No carotid bruit.   Cardiovascular:      Rate and Rhythm: Normal rate and regular rhythm.      Pulses: Normal pulses.      Heart sounds: Normal heart sounds. No murmur heard.  Pulmonary:      Effort: Pulmonary effort is normal.      Breath sounds: Normal breath sounds and air entry. No wheezing.   Abdominal:      General: Bowel sounds are normal. There is no distension.      Palpations: Abdomen is soft.      Tenderness: There is no abdominal tenderness.   Musculoskeletal:      Cervical back: Neck supple. No rigidity.      Comments: Dressed right knee   Skin:     General: Skin is warm.      Coloration: Skin is not jaundiced.      Findings: No lesion.   Neurological:      General: No focal deficit present.      Mental Status: She is alert and oriented to person, place, and time.      Cranial Nerves: No cranial nerve deficit.   Psychiatric:         Attention and Perception: Attention normal.         Mood and Affect: Mood normal.         Behavior: Behavior normal. Behavior is cooperative.         Thought Content: Thought content normal.         Cognition and Memory: Cognition normal.           Significant Labs: All  pertinent labs within the past 24 hours have been reviewed.    Significant Imaging: I have reviewed all pertinent imaging results/findings within the past 24 hours.      Assessment/Plan:      * Localized osteoarthritis of right knee    Knee replacement surgery 06/26    History of left breast cancer    Status post mastectomy in 2020  No radiation or chemotherapy required  Followed by Dr. Warner    06/26 Discussed with Dr Warner and pt will hold Armidex for 2 weeks secondary risk DVT. Discussed with pt.    Type 2 diabetes mellitus without complication, without long-term current use of insulin  Patient's FSGs are controlled on current medication regimen.  Last A1c reviewed-   Lab Results   Component Value Date    HGBA1C 6.2 06/27/2023     Most recent fingerstick glucose reviewed- No results for input(s): POCTGLUCOSE in the last 24 hours.  Current correctional scale  Medium  Maintain anti-hyperglycemic dose as follows-   Antihyperglycemics (From admission, onward)    Start     Stop Route Frequency Ordered    06/29/23 0745  glipiZIDE tablet 5 mg         -- Oral With breakfast 06/27/23 2239    06/26/23 1934  insulin aspart U-100 injection 1-10 Units         -- SubQ Before meals & nightly PRN 06/26/23 1834        Hold Oral hypoglycemics while patient is in the hospital.    Systolic hypertension  Continue with home blood pressure medication      VTE Risk Mitigation (From admission, onward)         Ordered     IP VTE LOW RISK PATIENT  Once         06/27/23 0811     Place sequential compression device  Until discontinued         06/27/23 0811     Place SONI hose  Until discontinued         06/26/23 1431     Place sequential compression device  Until discontinued         06/26/23 1431                Discharge Planning   GERRI: 6/27/2023     Code Status: Prior   Is the patient medically ready for discharge?:     Reason for patient still in hospital (select all that apply): Treatment  Discharge Plan A: Rehab                   Marty Weathers DO  Department of Hospital Medicine   [facility]

## 2023-06-29 NOTE — PT/OT/SLP PROGRESS
Physical Therapy      Patient Name:  Sadie Martinez   MRN:  [id number]    Patient not seen today secondary to Off the floor for procedure/surgery (pt gone for R knee revision/manipulation in AM). Will follow-up in PM.

## 2023-06-29 NOTE — SUBJECTIVE & OBJECTIVE
Past Medical History:   Diagnosis Date    Arthritis     Breast cancer     Left breast mastectomy 4/16/2020    Cancer     Diabetes mellitus, type 2     Hypertension        Past Surgical History:   Procedure Laterality Date    ARTHROPLASTY, KNEE, TOTAL, USING COMPUTER-ASSISTED NAVIGATION Right 6/26/2023    Procedure: ARTHROPLASTY, KNEE, TOTAL, USING COMPUTER-ASSISTED NAVIGATION;  Surgeon: Indra Matos MD;  Location: [facility];  Service: Orthopedics;  Laterality: Right;    ARTHROSCOPIC CHONDROPLASTY OF KNEE JOINT Right 9/8/2021    Procedure: ARTHROSCOPY, KNEE, WITH CHONDROPLASTY;  Surgeon: Indra Matos MD;  Location: [facility] OR;  Service: Orthopedics;  Laterality: Right;    BACK SURGERY      CERVICAL BIOPSY  W/ LOOP ELECTRODE EXCISION      HYSTERECTOMY      KNEE ARTHROSCOPY W/ MENISCECTOMY Right 9/8/2021    Procedure: ARTHROSCOPY, KNEE, WITH MENISCECTOMY;  Surgeon: Indra Matos MD;  Location: [facility] OR;  Service: Orthopedics;  Laterality: Right;    KNEE ARTHROSCOPY W/ PLICA EXCISION Right 9/8/2021    Procedure: EXCISION, PLICA, KNEE, ARTHROSCOPIC;  Surgeon: Indra Matos MD;  Location: [facility];  Service: Orthopedics;  Laterality: Right;    MASTECTOMY, PARTIAL Left     TUBAL LIGATION         Review of patient's allergies indicates:   Allergen Reactions    Mobic [meloxicam] Swelling       No current facility-administered medications on file prior to encounter.     Current Outpatient Medications on File Prior to Encounter   Medication Sig    amLODIPine (NORVASC) 5 MG tablet Take 5 mg by mouth once daily.    anastrozole (ARIMIDEX) 1 mg Tab Take 1 mg by mouth once daily.    calcium carbonate (OS-WILLIAM) 600 mg calcium (1,500 mg) Tab Take 600 mg by mouth once daily.    cyclobenzaprine (FLEXERIL) 10 MG tablet TAKE 1 TABLET BY MOUTH AT BEDTIME AS NEEDED FOR MUSCLE PAIN    gabapentin (NEURONTIN) 300 MG capsule Take 300 mg by mouth 3 (three) times daily.    glipiZIDE (GLUCOTROL) 10 MG tablet TAKE  1 TABLET BY MOUTH EVERY DAY BEFORE A MEAL    hydroCHLOROthiazide (HYDRODIURIL) 25 MG tablet Take 25 mg by mouth once daily.    metFORMIN (GLUCOPHAGE) 1000 MG tablet Take 1,000 mg by mouth 2 (two) times daily with meals.    rosuvastatin (CRESTOR) 10 MG tablet Take 10 mg by mouth once daily.    ondansetron (ZOFRAN-ODT) 4 MG TbDL Take 1 tablet (4 mg total) by mouth every 6 (six) hours as needed (nausea).     Family History       Problem Relation (Age of Onset)    Dementia Father    Diabetes Mother    Heart attack Mother    Hypertension Mother    Pancreatic cancer Maternal Uncle    Stomach cancer Maternal Uncle    Stroke Father          Tobacco Use    Smoking status: Never    Smokeless tobacco: Never   Substance and Sexual Activity    Alcohol use: Not Currently    Drug use: Never    Sexual activity: Not Currently     Partners: Male     Review of Systems   Constitutional:  Negative for appetite change, fatigue and fever.   HENT:  Negative for congestion, hearing loss and trouble swallowing.         Wears glasses   Respiratory:  Negative for chest tightness, shortness of breath and wheezing.    Cardiovascular:  Negative for chest pain and palpitations.   Gastrointestinal:  Negative for abdominal pain, constipation and nausea.   Genitourinary:  Negative for difficulty urinating and dysuria.   Musculoskeletal:  Positive for arthralgias and back pain. Negative for neck stiffness.   Skin:  Negative for pallor and rash.   Neurological:  Negative for dizziness, speech difficulty and headaches.   Psychiatric/Behavioral:  Negative for confusion and suicidal ideas.    Objective:     Vital Signs (Most Recent):  Temp: 97.5 °F (36.4 °C) (06/28/23 1920)  Pulse: 105 (06/28/23 1920)  Resp: 18 (06/28/23 1920)  BP: (!) 140/60 (06/28/23 1920)  SpO2: 97 % (06/28/23 2022) Vital Signs (24h Range):  Temp:  [97.5 °F (36.4 °C)-100 °F (37.8 °C)] 97.5 °F (36.4 °C)  Pulse:  [] 105  Resp:  [16-20] 18  SpO2:  [95 %-100 %] 97 %  BP:  (114-151)/(60-87) 140/60     Weight: 74.4 kg (164 lb)  Body mass index is 30 kg/m².     Physical Exam  Vitals reviewed.   Constitutional:       General: She is awake. She is not in acute distress.     Appearance: She is well-developed. She is not toxic-appearing.   HENT:      Head: Normocephalic.      Nose: Nose normal.      Mouth/Throat:      Pharynx: Oropharynx is clear.   Eyes:      Extraocular Movements: Extraocular movements intact.      Pupils: Pupils are equal, round, and reactive to light.   Neck:      Thyroid: No thyroid mass.      Vascular: No carotid bruit.   Cardiovascular:      Rate and Rhythm: Normal rate and regular rhythm.      Pulses: Normal pulses.      Heart sounds: Normal heart sounds. No murmur heard.  Pulmonary:      Effort: Pulmonary effort is normal.      Breath sounds: Normal breath sounds and air entry. No wheezing.   Abdominal:      General: Bowel sounds are normal. There is no distension.      Palpations: Abdomen is soft.      Tenderness: There is no abdominal tenderness.   Musculoskeletal:      Cervical back: Neck supple. No rigidity.      Comments: Changes consistent with right knee surgery this admission   Skin:     General: Skin is warm.      Coloration: Skin is not jaundiced.      Findings: No lesion.   Neurological:      General: No focal deficit present.      Mental Status: She is alert and oriented to person, place, and time.      Cranial Nerves: No cranial nerve deficit.   Psychiatric:         Attention and Perception: Attention normal.         Mood and Affect: Mood normal.         Behavior: Behavior normal. Behavior is cooperative.         Thought Content: Thought content normal.         Cognition and Memory: Cognition normal.        Significant Labs: All pertinent labs within the past 24 hours have been reviewed.  BMP:   Recent Labs   Lab 06/27/23  0336   *      K 3.4*      CO2 27   BUN 14   CREATININE 1.30*   CALCIUM 8.2*   MG 1.3*       CBC:   Recent Labs    Lab 06/27/23  0336   WBC 11.22*   HGB 10.1*   HCT 30.8*          CMP:   Recent Labs   Lab 06/27/23  0336      K 3.4*      CO2 27   *   BUN 14   CREATININE 1.30*   CALCIUM 8.2*   PROT 6.0*   ALBUMIN 3.0*   BILITOT 0.3   ALKPHOS 54   AST 16   ALT 17   ANIONGAP 12         Significant Imaging: I have reviewed all pertinent imaging results/findings within the past 24 hours.      Intake/Output - Last 3 Shifts         06/27 0700 06/28 0659 06/28 0700 06/29 0659    P.O.      I.V. (mL/kg)  25 (0.3)    IV Piggyback      Total Intake(mL/kg)  25 (0.3)    Net  +25          Urine Occurrence 2 x 2 x          Microbiology Results (last 7 days)       ** No results found for the last 168 hours. **

## 2023-06-29 NOTE — ASSESSMENT & PLAN NOTE
Patient's FSGs are controlled on current medication regimen.  Last A1c reviewed-   Lab Results   Component Value Date    HGBA1C 6.2 06/27/2023     Most recent fingerstick glucose reviewed- No results for input(s): POCTGLUCOSE in the last 24 hours.  Current correctional scale  Medium  Maintain anti-hyperglycemic dose as follows-   Antihyperglycemics (From admission, onward)    Start     Stop Route Frequency Ordered    06/29/23 0745  glipiZIDE tablet 5 mg         -- Oral With breakfast 06/27/23 2239    06/26/23 1934  insulin aspart U-100 injection 1-10 Units         -- SubQ Before meals & nightly PRN 06/26/23 1834        Hold Oral hypoglycemics while patient is in the hospital.

## 2023-06-29 NOTE — SUBJECTIVE & OBJECTIVE
Interval History: naeo    Review of Systems   Constitutional:  Negative for chills, fatigue, fever and unexpected weight change.   HENT:  Negative for congestion, mouth sores and sore throat.    Eyes:  Negative for photophobia and visual disturbance.   Respiratory:  Negative for cough, chest tightness, shortness of breath and wheezing.    Cardiovascular:  Negative for chest pain, palpitations and leg swelling.   Gastrointestinal:  Negative for abdominal pain, diarrhea, nausea and vomiting.   Endocrine: Negative for cold intolerance and heat intolerance.   Genitourinary:  Negative for difficulty urinating, dysuria, frequency and urgency.   Musculoskeletal:  Positive for arthralgias. Negative for back pain and myalgias.   Skin:  Negative for pallor and rash.   Neurological:  Negative for tremors, seizures, syncope, weakness, numbness and headaches.   Hematological:  Does not bruise/bleed easily.   Psychiatric/Behavioral:  Negative for agitation, confusion, hallucinations and suicidal ideas.    Objective:     Vital Signs (Most Recent):  Temp: 98 °F (36.7 °C) (06/29/23 0714)  Pulse: 93 (06/29/23 0714)  Resp: 17 (06/29/23 0950)  BP: (!) 107/50 (06/29/23 0714)  SpO2: 99 % (06/29/23 0714) Vital Signs (24h Range):  Temp:  [97.1 °F (36.2 °C)-99.7 °F (37.6 °C)] 98 °F (36.7 °C)  Pulse:  [] 93  Resp:  [16-18] 17  SpO2:  [96 %-100 %] 99 %  BP: (107-140)/(50-82) 107/50     Weight: 74.4 kg (164 lb)  Body mass index is 30 kg/m².  No intake or output data in the 24 hours ending 06/29/23 1110      Physical Exam  Vitals reviewed.   Constitutional:       General: She is awake. She is not in acute distress.     Appearance: She is well-developed. She is not toxic-appearing.   HENT:      Head: Normocephalic.      Nose: Nose normal.      Mouth/Throat:      Pharynx: Oropharynx is clear.   Eyes:      Extraocular Movements: Extraocular movements intact.      Pupils: Pupils are equal, round, and reactive to light.   Neck:      Thyroid:  No thyroid mass.      Vascular: No carotid bruit.   Cardiovascular:      Rate and Rhythm: Normal rate and regular rhythm.      Pulses: Normal pulses.      Heart sounds: Normal heart sounds. No murmur heard.  Pulmonary:      Effort: Pulmonary effort is normal.      Breath sounds: Normal breath sounds and air entry. No wheezing.   Abdominal:      General: Bowel sounds are normal. There is no distension.      Palpations: Abdomen is soft.      Tenderness: There is no abdominal tenderness.   Musculoskeletal:      Cervical back: Neck supple. No rigidity.      Comments: Dressed right knee   Skin:     General: Skin is warm.      Coloration: Skin is not jaundiced.      Findings: No lesion.   Neurological:      General: No focal deficit present.      Mental Status: She is alert and oriented to person, place, and time.      Cranial Nerves: No cranial nerve deficit.   Psychiatric:         Attention and Perception: Attention normal.         Mood and Affect: Mood normal.         Behavior: Behavior normal. Behavior is cooperative.         Thought Content: Thought content normal.         Cognition and Memory: Cognition normal.           Significant Labs: All pertinent labs within the past 24 hours have been reviewed.    Significant Imaging: I have reviewed all pertinent imaging results/findings within the past 24 hours.

## 2023-06-29 NOTE — PT/OT/SLP PROGRESS
Physical Therapy Treatment    Patient Name:  Sadie Martinez   MRN:  [id number]    Recommendations:     Discharge Recommendations: nursing facility, skilled, rehabilitation facility  Discharge Equipment Recommendations: none  Barriers to discharge: Inaccessible home and Decreased caregiver support    Assessment:     Sadie Martinez is a 58 y.o. female admitted with a medical diagnosis of Localized osteoarthritis of right knee.  She presents with the following impairments/functional limitations: impaired balance, impaired endurance, impaired functional mobility, decreased ROM, pain, orthopedic precautions. Pt. With good participation with PT, mobility.     Rehab Prognosis: Good; patient would benefit from acute skilled PT services to address these deficits and reach maximum level of function.    Recent Surgery: Procedure(s) (LRB):  MANIPULATION, KNEE (Right) 1 Day Post-Op    Plan:     During this hospitalization, patient to be seen BID to address the identified rehab impairments via gait training, therapeutic activities, therapeutic exercises and progress toward the following goals:    Plan of Care Expires:  07/26/23    Subjective     Chief Complaint: knee pain  Patient/Family Comments/goals: Pt. With no complaints outside of pain. States she is going today  Pain/Comfort:  Pain Rating 1: 6/10  Location - Side 1: Right  Location 1: knee  Pain Addressed 1: Nurse notified      Objective:     Communicated with Mary Buckner PT for POC and Lexi Painter LPN prior to session.  Patient found up in chair with knee immobilizer, peripheral IV upon PT entry to room.     General Precautions: Standard, fall  Orthopedic Precautions: RLE weight bearing as tolerated  Braces: Knee immobilizer  Respiratory Status: Room air     Functional Mobility:  Transfers:     Sit to Stand:  minimum assistance with rolling walker  Gait: Pt. Performed gait x 35 feet with RW  with Fair + balance with gait belt in place and CGA      AM-PAC 6 CLICK  MOBILITY  Turning over in bed (including adjusting bedclothes, sheets and blankets)?: 3  Sitting down on and standing up from a chair with arms (e.g., wheelchair, bedside commode, etc.): 3  Moving from lying on back to sitting on the side of the bed?: 3  Moving to and from a bed to a chair (including a wheelchair)?: 3  Need to walk in hospital room?: 3  Climbing 3-5 steps with a railing?: 1  Basic Mobility Total Score: 16       Treatment & Education:  Pt. Participated in Longsitting: AP, Toe curls x 20 each,  Hip ABD/ ADD x 10 each, SLR 2 x 5 on Right and Heel slides with MRE x 20 on Left.     Patient left up in chair with all lines intact and call button in reach..    GOALS:   Multidisciplinary Problems       Physical Therapy Goals          Problem: Physical Therapy    Goal Priority Disciplines Outcome Goal Variances Interventions   Physical Therapy Goal     PT, PT/OT Ongoing, Progressing     Description: Short Term Goals to be met by: 23    Patient will increase functional independence with mobility by performin. Supine to sit with Modified [place]  2. Sit to stand transfer with Modified [place]  3. Bed to chair transfer with Modified [place] using Rolling Walker, WBAT R LE with immobilizer  4. Gait  x 100 feet with Modified [place] using Rolling Walker, WBAT R LE with immobilizer  5. Lower extremity exercise program x30 reps per handout, with assistance as needed with immobilizer  6. Negotiate 3 steps with 1 rail with CGA with immobilizer    Long Term Goals to be met by: 23    Pt will regain full independent functional mobility to return to prior activities of daily living.                        Time Tracking:     PT Received On: 23  PT Start Time: 912     PT Stop Time: 936  PT Total Time (min): 24 min     Billable Minutes: Gait Training 9 and Therapeutic Exercise 15    Treatment Type: Treatment  PT/PTA: PTA     Number of PTA visits since last PT visit: 1      06/29/2023

## 2023-06-29 NOTE — ASSESSMENT & PLAN NOTE
Status post mastectomy in 2020  No radiation or chemotherapy required  Followed by Dr. Warner    06/26 Discussed with Dr Warner and pt will hold Armidex for 2 weeks secondary risk DVT. Discussed with pt.

## 2023-06-29 NOTE — PT/OT/SLP PROGRESS
Physical Therapy      Patient Name:  Sadie Martinez   MRN:  [id number]    Patient not seen today secondary to knee and back Pain and pt. With low blood glucose @ 55 per Nursing care. Will follow-up tomorrow.

## 2023-06-30 ENCOUNTER — HOSPITAL ENCOUNTER (INPATIENT)
Facility: HOSPITAL | Age: 58
LOS: 12 days | Discharge: HOME HEALTH | DRG: 561 | End: 2023-07-12
Attending: FAMILY MEDICINE | Admitting: FAMILY MEDICINE
Payer: MEDICARE

## 2023-06-30 VITALS
HEART RATE: 97 BPM | HEIGHT: 62 IN | OXYGEN SATURATION: 98 % | TEMPERATURE: 99 F | BODY MASS INDEX: 30.18 KG/M2 | DIASTOLIC BLOOD PRESSURE: 68 MMHG | WEIGHT: 164 LBS | RESPIRATION RATE: 17 BRPM | SYSTOLIC BLOOD PRESSURE: 126 MMHG

## 2023-06-30 DIAGNOSIS — E83.42 HYPOMAGNESEMIA: ICD-10-CM

## 2023-06-30 DIAGNOSIS — E87.6 HYPOKALEMIA: Primary | ICD-10-CM

## 2023-06-30 DIAGNOSIS — R53.1 GENERALIZED WEAKNESS: ICD-10-CM

## 2023-06-30 DIAGNOSIS — E11.9 TYPE 2 DIABETES MELLITUS WITHOUT COMPLICATION, WITHOUT LONG-TERM CURRENT USE OF INSULIN: ICD-10-CM

## 2023-06-30 DIAGNOSIS — Z96.651 S/P TOTAL KNEE REPLACEMENT, RIGHT: ICD-10-CM

## 2023-06-30 LAB
GLUCOSE SERPL-MCNC: 139 MG/DL (ref 70–105)
GLUCOSE SERPL-MCNC: 152 MG/DL (ref 70–105)
GLUCOSE SERPL-MCNC: 230 MG/DL (ref 70–105)
GLUCOSE SERPL-MCNC: 347 MG/DL (ref 70–105)

## 2023-06-30 PROCEDURE — 97116 GAIT TRAINING THERAPY: CPT

## 2023-06-30 PROCEDURE — 11000004 HC SNF PRIVATE

## 2023-06-30 PROCEDURE — 99231 PR SUBSEQUENT HOSPITAL CARE,LEVL I: ICD-10-PCS | Mod: ,,, | Performed by: STUDENT IN AN ORGANIZED HEALTH CARE EDUCATION/TRAINING PROGRAM

## 2023-06-30 PROCEDURE — 25000003 PHARM REV CODE 250: Performed by: NURSE PRACTITIONER

## 2023-06-30 PROCEDURE — 99231 SBSQ HOSP IP/OBS SF/LOW 25: CPT | Mod: ,,, | Performed by: STUDENT IN AN ORGANIZED HEALTH CARE EDUCATION/TRAINING PROGRAM

## 2023-06-30 PROCEDURE — 94761 N-INVAS EAR/PLS OXIMETRY MLT: CPT

## 2023-06-30 PROCEDURE — 25000003 PHARM REV CODE 250: Performed by: STUDENT IN AN ORGANIZED HEALTH CARE EDUCATION/TRAINING PROGRAM

## 2023-06-30 PROCEDURE — 25000003 PHARM REV CODE 250: Performed by: FAMILY MEDICINE

## 2023-06-30 PROCEDURE — 25000003 PHARM REV CODE 250: Performed by: ORTHOPAEDIC SURGERY

## 2023-06-30 PROCEDURE — 97110 THERAPEUTIC EXERCISES: CPT | Mod: CO

## 2023-06-30 PROCEDURE — 82962 GLUCOSE BLOOD TEST: CPT

## 2023-06-30 PROCEDURE — 97110 THERAPEUTIC EXERCISES: CPT

## 2023-06-30 PROCEDURE — 82962 GLUCOSE BLOOD TEST: CPT | Mod: 91

## 2023-06-30 RX ORDER — SENNOSIDES 8.6 MG/1
8.6 TABLET ORAL DAILY
Status: DISCONTINUED | OUTPATIENT
Start: 2023-07-01 | End: 2023-07-12 | Stop reason: HOSPADM

## 2023-06-30 RX ORDER — HYDROCODONE BITARTRATE AND ACETAMINOPHEN 7.5; 325 MG/1; MG/1
1 TABLET ORAL EVERY 6 HOURS PRN
Status: DISCONTINUED | OUTPATIENT
Start: 2023-06-30 | End: 2023-07-10

## 2023-06-30 RX ORDER — DOCUSATE SODIUM 100 MG/1
100 CAPSULE, LIQUID FILLED ORAL EVERY 12 HOURS
Status: DISCONTINUED | OUTPATIENT
Start: 2023-06-30 | End: 2023-06-30 | Stop reason: HOSPADM

## 2023-06-30 RX ORDER — ATORVASTATIN CALCIUM 40 MG/1
40 TABLET, FILM COATED ORAL NIGHTLY
Status: DISCONTINUED | OUTPATIENT
Start: 2023-07-01 | End: 2023-07-12 | Stop reason: HOSPADM

## 2023-06-30 RX ORDER — GLIPIZIDE 5 MG/1
5 TABLET ORAL ONCE
Status: COMPLETED | OUTPATIENT
Start: 2023-06-30 | End: 2023-06-30

## 2023-06-30 RX ORDER — ENOXAPARIN SODIUM 100 MG/ML
60 INJECTION SUBCUTANEOUS EVERY 24 HOURS
Status: DISCONTINUED | OUTPATIENT
Start: 2023-07-01 | End: 2023-07-12 | Stop reason: HOSPADM

## 2023-06-30 RX ORDER — PANTOPRAZOLE SODIUM 40 MG/1
40 TABLET, DELAYED RELEASE ORAL DAILY
Status: DISCONTINUED | OUTPATIENT
Start: 2023-07-01 | End: 2023-07-12 | Stop reason: HOSPADM

## 2023-06-30 RX ORDER — POLYETHYLENE GLYCOL 3350 17 G/17G
34 POWDER, FOR SOLUTION ORAL 2 TIMES DAILY
Status: DISCONTINUED | OUTPATIENT
Start: 2023-06-30 | End: 2023-06-30 | Stop reason: HOSPADM

## 2023-06-30 RX ORDER — ACETAMINOPHEN 325 MG/1
650 TABLET ORAL EVERY 4 HOURS PRN
Status: DISCONTINUED | OUTPATIENT
Start: 2023-06-30 | End: 2023-07-12 | Stop reason: HOSPADM

## 2023-06-30 RX ORDER — SENNOSIDES 8.6 MG/1
8.6 TABLET ORAL DAILY
Status: DISCONTINUED | OUTPATIENT
Start: 2023-06-30 | End: 2023-06-30 | Stop reason: HOSPADM

## 2023-06-30 RX ORDER — ASPIRIN 81 MG/1
81 TABLET ORAL DAILY
Status: DISCONTINUED | OUTPATIENT
Start: 2023-07-01 | End: 2023-07-12 | Stop reason: HOSPADM

## 2023-06-30 RX ORDER — OXYCODONE HYDROCHLORIDE 5 MG/1
10 TABLET ORAL EVERY 4 HOURS PRN
Status: DISCONTINUED | OUTPATIENT
Start: 2023-06-30 | End: 2023-06-30 | Stop reason: HOSPADM

## 2023-06-30 RX ORDER — ONDANSETRON 4 MG/1
4 TABLET, ORALLY DISINTEGRATING ORAL EVERY 6 HOURS PRN
Status: DISCONTINUED | OUTPATIENT
Start: 2023-06-30 | End: 2023-07-12 | Stop reason: HOSPADM

## 2023-06-30 RX ORDER — GLIPIZIDE 2.5 MG/1
10 TABLET, EXTENDED RELEASE ORAL
Status: DISCONTINUED | OUTPATIENT
Start: 2023-07-01 | End: 2023-07-03 | Stop reason: DRUGHIGH

## 2023-06-30 RX ORDER — AMLODIPINE BESYLATE 5 MG/1
5 TABLET ORAL DAILY
Status: DISCONTINUED | OUTPATIENT
Start: 2023-07-01 | End: 2023-07-12 | Stop reason: HOSPADM

## 2023-06-30 RX ORDER — GLIPIZIDE 2.5 MG/1
5 TABLET, EXTENDED RELEASE ORAL
Status: DISCONTINUED | OUTPATIENT
Start: 2023-07-01 | End: 2023-06-30

## 2023-06-30 RX ORDER — MUPIROCIN 20 MG/G
OINTMENT TOPICAL 2 TIMES DAILY
Status: COMPLETED | OUTPATIENT
Start: 2023-06-30 | End: 2023-07-05

## 2023-06-30 RX ORDER — HYDROCHLOROTHIAZIDE 12.5 MG/1
25 TABLET ORAL DAILY
Status: DISCONTINUED | OUTPATIENT
Start: 2023-07-01 | End: 2023-07-12 | Stop reason: HOSPADM

## 2023-06-30 RX ADMIN — PREGABALIN 75 MG: 75 CAPSULE ORAL at 08:06

## 2023-06-30 RX ADMIN — ATORVASTATIN CALCIUM 40 MG: 40 TABLET, FILM COATED ORAL at 08:06

## 2023-06-30 RX ADMIN — GLIPIZIDE 5 MG: 5 TABLET ORAL at 10:06

## 2023-06-30 RX ADMIN — MUPIROCIN: 20 OINTMENT TOPICAL at 09:06

## 2023-06-30 RX ADMIN — HYDROCODONE BITARTRATE AND ACETAMINOPHEN 1 TABLET: 7.5; 325 TABLET ORAL at 09:06

## 2023-06-30 RX ADMIN — HYDROCHLOROTHIAZIDE 25 MG: 25 TABLET ORAL at 08:06

## 2023-06-30 RX ADMIN — ASPIRIN 81 MG 324 MG: 81 TABLET ORAL at 08:06

## 2023-06-30 RX ADMIN — MUPIROCIN 1 G: 20 OINTMENT TOPICAL at 08:06

## 2023-06-30 RX ADMIN — POLYETHYLENE GLYCOL 3350 17 G: 17 POWDER, FOR SOLUTION ORAL at 08:06

## 2023-06-30 RX ADMIN — GLIPIZIDE 5 MG: 5 TABLET ORAL at 08:06

## 2023-06-30 RX ADMIN — SENNOSIDES 8.6 MG: 8.6 TABLET, FILM COATED ORAL at 01:06

## 2023-06-30 RX ADMIN — DOCUSATE SODIUM 100 MG: 100 CAPSULE, LIQUID FILLED ORAL at 08:06

## 2023-06-30 RX ADMIN — OXYCODONE HYDROCHLORIDE 10 MG: 5 TABLET ORAL at 07:06

## 2023-06-30 RX ADMIN — FAMOTIDINE 20 MG: 20 TABLET, FILM COATED ORAL at 08:06

## 2023-06-30 RX ADMIN — OXYCODONE HYDROCHLORIDE 10 MG: 5 TABLET ORAL at 01:06

## 2023-06-30 RX ADMIN — AMLODIPINE BESYLATE 5 MG: 5 TABLET ORAL at 08:06

## 2023-06-30 NOTE — PT/OT/SLP PROGRESS
Occupational Therapy   Treatment    Name: Sadie Martinez  MRN: [id number]  Admitting Diagnosis:  Localized osteoarthritis of right knee  2 Days Post-Op    Recommendations:     Discharge Recommendations: nursing facility, skilled, rehabilitation facility  Discharge Equipment Recommendations:   (to be determined)  Barriers to discharge:       Assessment:     Sadie Martinez is a 58 y.o. female with a medical diagnosis of Localized osteoarthritis of right knee.  Performance deficits affecting function are weakness, impaired endurance, orthopedic precautions.     Rehab Prognosis:  Good; patient would benefit from acute skilled OT services to address these deficits and reach maximum level of function.       Plan:     Patient to be seen 5 x/week to address the above listed problems via self-care/home management, therapeutic activities, therapeutic exercises  Plan of Care Expires:    Plan of Care Reviewed with: patient    Subjective     Chief Complaint:   Patient/Family Comments/goals:   Pain/Comfort:  Pain Rating 1: 0/10    Objective:     Communicated with:Tyson Mayfield LPN  prior to session.  Patient found up in chair with knee immobilizer, peripheral IV upon OT entry to room.    General Precautions: Standard, fall    Orthopedic Precautions:RLE weight bearing as tolerated  Braces: Knee immobilizer  Respiratory Status: Room air     Occupational Performance:     Bed Mobility:         Functional Mobility/Transfers:    Functional Mobility:     Activities of Daily Living:        [facility] 6 Click ADL:      Treatment & Education:  Pt performed rom ex's with 2 lb wt 15 reps x 2 B shld flex,abd/add,elbow flex/ext, wrist flex/ext, red t band 15 rep x 2 B elbow flex.abd/add, hand helper with  2 rubber b and resistances 20 reps x 2  Patient left up in chair with all lines intact and call button in reach    GOALS:   Multidisciplinary Problems       Occupational Therapy Goals          Problem: Occupational Therapy    Goal Priority  Disciplines Outcome Interventions   Occupational Therapy Goal     OT, PT/OT Ongoing, Progressing    Description: STG:  Pt will perform grooming with setup  Pt will bathe with min a  Pt will perform UE dressing with (I)   Pt will perform LE dressing with I/Mod I  Pt will transfer bed/chair/bsc with (S)  Pt will perform standing task x 2 min with (S)   Pt will tolerate 15 minutes of tx without fatigue      LT.Restore to max I with self care and mobility.                         Time Tracking:     OT Date of Treatment: 23  OT Start Time: 1123  OT Stop Time: 1207  OT Total Time (min): 44 min    Billable Minutes:Therapeutic Exercise 30    OT/MONSTER: MONSTER          2023

## 2023-06-30 NOTE — PLAN OF CARE
[facility]  Discharge Final Note    Primary Care Provider: Indra Matos MD    Expected Discharge Date: 6/27/2023    Final Discharge Note (most recent)       Final Note - 06/30/23 1440          Final Note    Assessment Type Final Discharge Note     Anticipated Discharge Disposition Swing Bed        Post-Acute Status    Post-Acute Authorization Placement     Post-Acute Placement Status Set-up Complete/Auth obtained     Patient choice form signed by patient/caregiver List with quality metrics by geographic area provided;List from CMS Compare;List from System Post-Acute Care     Discharge Delays None known at this time                     Important Message from Medicare              Follow-up providers       WENDY Baez   Specialty: Family Medicine, Emergency Medicine, Orthopedic Surgery    [address]  [facility] [place] [address]   Phone: [phone]       Next Steps: Follow up in 13 day(s)    Instructions: July 10,2023 0820              After-discharge care                Destination       [facility]   Service: Skilled Nursing    [address]  [place] [address]   Phone: [phone]                             Pt dc to  bk. Chart reviewed, 0 dc needs.

## 2023-06-30 NOTE — HPI
Chief complaint:  Right knee pain.     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  Patient states she injured her right knee at work in 2017.  States has had discomfort with it since.  More than a year ago she had a meniscus tear in right knee requiring a knee scope.  Has had continued pain leading to her knee surgery.  Denies any significant pain to her left knee.      Review of system:  See above.  Wears glasses without recent change in vision.  States also has some chronic back issues which she states initiated at her work.  Denies any snoring and states sleep issues seemed to come from her back and chronic knee pain.  Review of systems otherwise.     Past medical history:  -hypertension greater than 15 years.  -diabetes mellitus type 2 treated with oral agents  -diagnosed 2020 with breast cancer status post left mastectomy.  No chemotherapy or radiation needed.  Followed by Dr. Warner.     Past surgical history:  Right knee scope  Right total knee replacement this admission  Previous left mastectomy in 2020  Hysterectomy with bilateral salpingo-oophorectomy  Low back surgery     Patient list Mobic as an allergy     Social history:  Lives by herself  Has children that live in the area that could possibly help her as needed  Would like to go to swing bed following this admission  No history of tobacco use  Drink alcohol only occasionally     Family history:  Mother had heart problems with heart attack starting her 60s     Health maintenance issues:  Primary care provider is Melanie Watts NP  Had flu shot last fall  No prior Pneumovax but recommended she get wanted age 63  Had mammogram in April  Last Pap smear 3 years earlier  Scheduled for colonoscopy next year, states prior colonoscopy showed an area they were concerned about infection and they found no polyps but told her to repeat it next year              Overview/Hospital  Course:  06/26 Not much pain. Ortho note seen with need to return to surgery with abnormal position prosthesis. Discussed with Dr Warner and will stop her Arimidex for two weeks to lower risk of DVT. Discussed with pt and she understands. BP and BS Ok. Correct electrolytes.   06/27 Seen prior to surgery in good spirits. Seems medically stable to go. Plan rehab at [place]. BP and BS good.

## 2023-06-30 NOTE — PT/OT/SLP PROGRESS
"Physical Therapy Treatment    Patient Name:  Sadie Martinez   MRN:  [id number]    Recommendations:     Discharge Recommendations: rehabilitation facility, nursing facility, skilled  Discharge Equipment Recommendations: walker, rolling  Barriers to discharge: None    Assessment:     Sadie Martinez is a 58 y.o. female admitted with a medical diagnosis of Localized osteoarthritis of right knee.  She presents with the following impairments/functional limitations: weakness, impaired endurance, impaired balance, impaired functional mobility, pain, decreased ROM, orthopedic precautions .    Patient participates well with PT interventions wearing the protective knee immobilizer. Patient reports Dr Indra Matos instructed her to wear the immobilizer for 2 weeks after her revision surgery. Patient approved for swing bed and will transition there later today once her daughter is off work.    Rehab Prognosis: Good; patient would benefit from acute skilled PT services to address these deficits and reach maximum level of function.    Recent Surgery: Procedure(s) (LRB):  MANIPULATION, KNEE (Right) 2 Days Post-Op    Plan:     During this hospitalization, patient to be seen BID to address the identified rehab impairments via gait training, therapeutic activities, therapeutic exercises and progress toward the following goals:    Plan of Care Expires:  07/26/23    Subjective     Chief Complaint: R TKR with revision required due to partial dislocation  Patient/Family Comments/goals: "I have been doing better. The doctor told me to keep this knee immobilizer on for two weeks."  Pain/Comfort:  Pain Rating 1: 8/10  Location - Side 1: Right  Location 1: knee  Pain Addressed 1: Reposition, Pre-medicate for activity, Distraction, Cessation of Activity  Pain Rating Post-Intervention 1: 8/10      Objective:     Communicated with Tyson Mayfield LPN prior to session.  Patient found supine with peripheral IV, knee immobilizer upon PT entry to " room.     General Precautions: Standard, fall  Orthopedic Precautions: RLE weight bearing as tolerated  Braces: Knee immobilizer  Respiratory Status: Room air     Functional Mobility:  Bed Mobility:     Supine to Sit: minimum assistance  Transfers:     Sit to Stand:  contact guard assistance with rolling walker and knee immobilizer  Bed to Chair: contact guard assistance with  rolling walker and knee immobilizer  using  Step Transfer  Gait: 80' x 2 trials SBA and verbal cues using RW/KI; seated rest break b/w trials; step to pattern, slow debra, mild right hip hiking to compensate for KI; no LOB, no lightheadedness      AM-PAC 6 CLICK MOBILITY  Turning over in bed (including adjusting bedclothes, sheets and blankets)?: 4  Sitting down on and standing up from a chair with arms (e.g., wheelchair, bedside commode, etc.): 4  Moving from lying on back to sitting on the side of the bed?: 3  Moving to and from a bed to a chair (including a wheelchair)?: 3  Need to walk in hospital room?: 3  Climbing 3-5 steps with a railing?: 3  Basic Mobility Total Score: 20       Treatment & Education:  Right lower extremity exercise x 30 reps: ankle pumps, Quad sets, glut sets, hip abduction/adduction, and straight leg raises with active assist ROM, verbal cues, tactile cues, and in knee immobilizer    Gait and transfers as above    Patient left up in chair with all lines intact, call button in reach, and Tyson Mayfield LPN notified..    GOALS:   Multidisciplinary Problems       Physical Therapy Goals          Problem: Physical Therapy    Goal Priority Disciplines Outcome Goal Variances Interventions   Physical Therapy Goal     PT, PT/OT Ongoing, Progressing     Description: Short Term Goals to be met by: 23    Patient will increase functional independence with mobility by performin. Supine to sit with Modified [place]  2. Sit to stand transfer with Modified [place]  3. Bed to chair transfer with Modified  [place] using Rolling Walker, WBAT R LE with immobilizer  4. Gait  x 100 feet with Modified [place] using Rolling Walker, WBAT R LE with immobilizer  5. Lower extremity exercise program x30 reps per handout, with assistance as needed with immobilizer  6. Negotiate 3 steps with 1 rail with CGA with immobilizer    Long Term Goals to be met by: 7/26/23    Pt will regain full independent functional mobility to return to prior activities of daily living.                        Time Tracking:     PT Received On: 06/30/23  PT Start Time: 1445     PT Stop Time: 1519  PT Total Time (min): 34 min     Billable Minutes: Gait Training 15 minutes and Therapeutic Exercise 15 minutes    Treatment Type: Treatment  PT/PTA: PT     Number of PTA visits since last PT visit: 0     06/30/2023

## 2023-06-30 NOTE — PROGRESS NOTES
[facility]  [facility] Medicine  Progress Note    Patient Name: Sadie Martinez  MRN: [id number]  Patient Class: OP- Outpatient Recovery   Admission Date: 6/26/2023  Length of Stay: 0 days  Attending Physician: Indra Matos MD  Primary Care Provider: Indra Matos MD        Subjective:     Principal Problem:Localized osteoarthritis of right knee        HPI:  Thank you for consult:    Chief complaint:  Right knee pain.    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  Patient states she injured her right knee at work in 2017.  States has had discomfort with it since.  More than a year ago she had a meniscus tear in right knee requiring a knee scope.  Has had continued pain leading to her knee surgery.  Denies any significant pain to her left knee.     Review of system:  See above.  Wears glasses without recent change in vision.  States also has some chronic back issues which she states initiated at her work.  Denies any snoring and states sleep issues seemed to come from her back and chronic knee pain.  Review of systems otherwise.    Past medical history:  -hypertension greater than 15 years.  -diabetes mellitus type 2 treated with oral agents  -diagnosed 2020 with breast cancer status post left mastectomy.  No chemotherapy or radiation needed.  Followed by Dr. Warner.    Past surgical history:  Right knee scope  Right total knee replacement this admission  Previous left mastectomy in 2020  Hysterectomy with bilateral salpingo-oophorectomy  Low back surgery    Patient list Mobic as an allergy    Social history:  Lives by herself  Has children that live in the area that could possibly help her as needed  Would like to go to swing bed following this admission  No history of tobacco use  Drink alcohol only occasionally    Family history:  Mother had heart problems with heart attack starting her 60s    Health maintenance  issues:  Primary care provider is Melanie Watst NP  Had flu shot last fall  No prior Pneumovax but recommended she get wanted age 63  Had mammogram in April  Last Pap smear 3 years earlier  Scheduled for colonoscopy next year, states prior colonoscopy showed an area they were concerned about infection and they found no polyps but told her to repeat it next year          Overview/Hospital Course:  06/26 Not much pain. Ortho note seen with need to return to surgery with abnormal position prosthesis. Discussed with Dr Warner and will stop her Arimidex for two weeks to lower risk of DVT. Discussed with pt and she understands. BP and BS Ok. Correct electrolytes.   06/27 Seen prior to surgery in good spirits. Seems medically stable to go. Plan rehab at [place]. BP and BS good.      Interval History: naeo    Review of Systems   Constitutional:  Negative for chills, fatigue, fever and unexpected weight change.   HENT:  Negative for congestion, mouth sores and sore throat.    Eyes:  Negative for photophobia and visual disturbance.   Respiratory:  Negative for cough, chest tightness, shortness of breath and wheezing.    Cardiovascular:  Negative for chest pain, palpitations and leg swelling.   Gastrointestinal:  Negative for abdominal pain, diarrhea, nausea and vomiting.   Endocrine: Negative for cold intolerance and heat intolerance.   Genitourinary:  Negative for difficulty urinating, dysuria, frequency and urgency.   Musculoskeletal:  Positive for arthralgias. Negative for back pain and myalgias.   Skin:  Negative for pallor and rash.   Neurological:  Negative for tremors, seizures, syncope, weakness, numbness and headaches.   Hematological:  Does not bruise/bleed easily.   Psychiatric/Behavioral:  Negative for agitation, confusion, hallucinations and suicidal ideas.    Objective:     Vital Signs (Most Recent):  Temp: 97.6 °F (36.4 °C) (06/30/23 1123)  Pulse: 104 (06/30/23 1123)  Resp: 17 (06/30/23 1123)  BP: 125/76 (06/30/23  1123)  SpO2: 97 % (06/30/23 1123) Vital Signs (24h Range):  Temp:  [97.6 °F (36.4 °C)-98.5 °F (36.9 °C)] 97.6 °F (36.4 °C)  Pulse:  [] 104  Resp:  [16-18] 17  SpO2:  [97 %-99 %] 97 %  BP: (125-136)/(64-76) 125/76     Weight: 74.4 kg (164 lb)  Body mass index is 30 kg/m².  No intake or output data in the 24 hours ending 06/30/23 1259      Physical Exam  Vitals reviewed.   Constitutional:       General: She is awake. She is not in acute distress.     Appearance: She is well-developed. She is not toxic-appearing.   HENT:      Head: Normocephalic.      Nose: Nose normal.      Mouth/Throat:      Pharynx: Oropharynx is clear.   Eyes:      Extraocular Movements: Extraocular movements intact.      Pupils: Pupils are equal, round, and reactive to light.   Neck:      Thyroid: No thyroid mass.      Vascular: No carotid bruit.   Cardiovascular:      Rate and Rhythm: Normal rate and regular rhythm.      Pulses: Normal pulses.      Heart sounds: Normal heart sounds. No murmur heard.  Pulmonary:      Effort: Pulmonary effort is normal.      Breath sounds: Normal breath sounds and air entry. No wheezing.   Abdominal:      General: Bowel sounds are normal. There is no distension.      Palpations: Abdomen is soft.      Tenderness: There is no abdominal tenderness.   Musculoskeletal:      Cervical back: Neck supple. No rigidity.      Comments: Dressed right knee   Skin:     General: Skin is warm.      Coloration: Skin is not jaundiced.      Findings: No lesion.   Neurological:      General: No focal deficit present.      Mental Status: She is alert and oriented to person, place, and time.      Cranial Nerves: No cranial nerve deficit.   Psychiatric:         Attention and Perception: Attention normal.         Mood and Affect: Mood normal.         Behavior: Behavior normal. Behavior is cooperative.         Thought Content: Thought content normal.         Cognition and Memory: Cognition normal.           Significant Labs: All  pertinent labs within the past 24 hours have been reviewed.    Significant Imaging: I have reviewed all pertinent imaging results/findings within the past 24 hours.      Assessment/Plan:      * Localized osteoarthritis of right knee    Knee replacement surgery 06/26    History of left breast cancer    Status post mastectomy in 2020  No radiation or chemotherapy required  Followed by Dr. Warner    06/26 Discussed with Dr Warner and pt will hold Armidex for 2 weeks secondary risk DVT. Discussed with pt.    Type 2 diabetes mellitus without complication, without long-term current use of insulin  Patient's FSGs are controlled on current medication regimen.  Last A1c reviewed-   Lab Results   Component Value Date    HGBA1C 6.2 06/27/2023     Most recent fingerstick glucose reviewed- No results for input(s): POCTGLUCOSE in the last 24 hours.  Current correctional scale  Medium  Maintain anti-hyperglycemic dose as follows-   Antihyperglycemics (From admission, onward)    Start     Stop Route Frequency Ordered    06/29/23 0745  glipiZIDE tablet 5 mg         -- Oral With breakfast 06/27/23 2239    06/26/23 1934  insulin aspart U-100 injection 1-10 Units         -- SubQ Before meals & nightly PRN 06/26/23 1834        Hold Oral hypoglycemics while patient is in the hospital.    Systolic hypertension  Continue with home blood pressure medication      VTE Risk Mitigation (From admission, onward)         Ordered     IP VTE LOW RISK PATIENT  Once         06/27/23 0811     Place sequential compression device  Until discontinued         06/27/23 0811     Place SONI hose  Until discontinued         06/26/23 1431     Place sequential compression device  Until discontinued         06/26/23 1431                Discharge Planning   GERRI: 6/27/2023     Code Status: Prior   Is the patient medically ready for discharge?:     Reason for patient still in hospital (select all that apply): Treatment  Discharge Plan A: Rehab                   Marty Weathers DO  Department of Hospital Medicine   [facility]

## 2023-06-30 NOTE — PLAN OF CARE
Rc'd call [facility] is full and can not take pt. Noam had rc'd second choice for [facility], spoke with petrona and she is reviewing pt will kaleb.

## 2023-06-30 NOTE — SUBJECTIVE & OBJECTIVE
Interval History: naeo    Review of Systems   Constitutional:  Negative for chills, fatigue, fever and unexpected weight change.   HENT:  Negative for congestion, mouth sores and sore throat.    Eyes:  Negative for photophobia and visual disturbance.   Respiratory:  Negative for cough, chest tightness, shortness of breath and wheezing.    Cardiovascular:  Negative for chest pain, palpitations and leg swelling.   Gastrointestinal:  Negative for abdominal pain, diarrhea, nausea and vomiting.   Endocrine: Negative for cold intolerance and heat intolerance.   Genitourinary:  Negative for difficulty urinating, dysuria, frequency and urgency.   Musculoskeletal:  Positive for arthralgias. Negative for back pain and myalgias.   Skin:  Negative for pallor and rash.   Neurological:  Negative for tremors, seizures, syncope, weakness, numbness and headaches.   Hematological:  Does not bruise/bleed easily.   Psychiatric/Behavioral:  Negative for agitation, confusion, hallucinations and suicidal ideas.    Objective:     Vital Signs (Most Recent):  Temp: 97.6 °F (36.4 °C) (06/30/23 1123)  Pulse: 104 (06/30/23 1123)  Resp: 17 (06/30/23 1123)  BP: 125/76 (06/30/23 1123)  SpO2: 97 % (06/30/23 1123) Vital Signs (24h Range):  Temp:  [97.6 °F (36.4 °C)-98.5 °F (36.9 °C)] 97.6 °F (36.4 °C)  Pulse:  [] 104  Resp:  [16-18] 17  SpO2:  [97 %-99 %] 97 %  BP: (125-136)/(64-76) 125/76     Weight: 74.4 kg (164 lb)  Body mass index is 30 kg/m².  No intake or output data in the 24 hours ending 06/30/23 1259      Physical Exam  Vitals reviewed.   Constitutional:       General: She is awake. She is not in acute distress.     Appearance: She is well-developed. She is not toxic-appearing.   HENT:      Head: Normocephalic.      Nose: Nose normal.      Mouth/Throat:      Pharynx: Oropharynx is clear.   Eyes:      Extraocular Movements: Extraocular movements intact.      Pupils: Pupils are equal, round, and reactive to light.   Neck:      Thyroid:  No thyroid mass.      Vascular: No carotid bruit.   Cardiovascular:      Rate and Rhythm: Normal rate and regular rhythm.      Pulses: Normal pulses.      Heart sounds: Normal heart sounds. No murmur heard.  Pulmonary:      Effort: Pulmonary effort is normal.      Breath sounds: Normal breath sounds and air entry. No wheezing.   Abdominal:      General: Bowel sounds are normal. There is no distension.      Palpations: Abdomen is soft.      Tenderness: There is no abdominal tenderness.   Musculoskeletal:      Cervical back: Neck supple. No rigidity.      Comments: Dressed right knee   Skin:     General: Skin is warm.      Coloration: Skin is not jaundiced.      Findings: No lesion.   Neurological:      General: No focal deficit present.      Mental Status: She is alert and oriented to person, place, and time.      Cranial Nerves: No cranial nerve deficit.   Psychiatric:         Attention and Perception: Attention normal.         Mood and Affect: Mood normal.         Behavior: Behavior normal. Behavior is cooperative.         Thought Content: Thought content normal.         Cognition and Memory: Cognition normal.           Significant Labs: All pertinent labs within the past 24 hours have been reviewed.    Significant Imaging: I have reviewed all pertinent imaging results/findings within the past 24 hours.

## 2023-06-30 NOTE — DISCHARGE SUMMARY
[facility]  Discharge Summary      Admit Date: 6/26/2023    Discharge Date and Time: No discharge date for patient encounter.    Attending Physician: Indra Matos MD     Reason for Admission:     Procedures Performed: Procedure(s) (LRB):  MANIPULATION, KNEE (Right)    Hospital Course (synopsis of major diagnoses, care, treatment, and services provided during the course of the hospital stay):  Was admitted following her surgery but found to have a displaced polyethylene liner on postoperative day 1.  This necessitated closed reduction.  After closed reduction participate in physical therapy was placed a knee immobilizer and we are going to keep her immobilized for about 7 days and then discontinue the knee immobilizer.  Postoperative day 3 she was felt to be suitable for discharge to swing bed.  Discharge with aspirin 81 mg b.i.d. for DVT prophylaxis for 30 days well as pain medication      Goals of Care Treatment Preferences:  Code Status: Full Code            Final Diagnoses:    Principal Problem: Localized osteoarthritis of right knee   Secondary Diagnoses:   Active Hospital Problems    Diagnosis  POA    *Localized osteoarthritis of right knee [M17.11]  Yes    Systolic hypertension [I10]  Yes    Type 2 diabetes mellitus without complication, without long-term current use of insulin [E11.9]  Yes    History of left breast cancer [Z85.3]  Not Applicable      Resolved Hospital Problems   No resolved problems to display.       Discharged Condition: good    Disposition: Skilled Nursing Facility    Follow Up/Patient Instructions:     Medications:  Reconciled Home Medications:      Medication List        START taking these medications      aspirin 81 MG EC tablet  Commonly known as: ECOTRIN  Take 1 tablet (81 mg total) by mouth 2 (two) times a day.     oxyCODONE-acetaminophen  mg per tablet  Commonly known as: PERCOCET  Take 1 tablet by mouth every 6 (six) hours as needed for Pain.      senna-docusate 8.6-50 mg 8.6-50 mg per tablet  Commonly known as: PERICOLACE  Take 1 tablet by mouth 2 (two) times daily.            CHANGE how you take these medications      * ondansetron 4 MG Tbdl  Commonly known as: ZOFRAN-ODT  Take 1 tablet (4 mg total) by mouth every 6 (six) hours as needed (nausea).  What changed: Another medication with the same name was added. Make sure you understand how and when to take each.     * ondansetron 4 MG Tbdl  Commonly known as: ZOFRAN-ODT  Take 2 tablets (8 mg total) by mouth every 8 (eight) hours as needed (Nausea).  What changed: You were already taking a medication with the same name, and this prescription was added. Make sure you understand how and when to take each.           * This list has 2 medication(s) that are the same as other medications prescribed for you. Read the directions carefully, and ask your doctor or other care provider to review them with you.                CONTINUE taking these medications      amLODIPine 5 MG tablet  Commonly known as: NORVASC  Take 5 mg by mouth once daily.     anastrozole 1 mg Tab  Commonly known as: ARIMIDEX  Take 1 mg by mouth once daily.     calcium carbonate 600 mg calcium (1,500 mg) Tab  Commonly known as: OS-WILLIAM  Take 600 mg by mouth once daily.     cyclobenzaprine 10 MG tablet  Commonly known as: FLEXERIL  TAKE 1 TABLET BY MOUTH AT BEDTIME AS NEEDED FOR MUSCLE PAIN     gabapentin 300 MG capsule  Commonly known as: NEURONTIN  Take 300 mg by mouth 3 (three) times daily.     glipiZIDE 10 MG tablet  Commonly known as: GLUCOTROL  TAKE 1 TABLET BY MOUTH EVERY DAY BEFORE A MEAL     hydroCHLOROthiazide 25 MG tablet  Commonly known as: HYDRODIURIL  Take 25 mg by mouth once daily.     metFORMIN 1000 MG tablet  Commonly known as: GLUCOPHAGE  Take 1,000 mg by mouth 2 (two) times daily with meals.     rosuvastatin 10 MG tablet  Commonly known as: CRESTOR  Take 10 mg by mouth once daily.            STOP taking these medications       HYDROcodone-acetaminophen 5-325 mg per tablet  Commonly known as: NORCO     HYDROcodone-acetaminophen 7.5-325 mg per tablet  Commonly known as: NORCO            Discharge Procedure Orders   Ambulatory referral/consult to Home Health   Standing Status: Future   Referral Priority: Routine Referral Type: Home Health Care   Referral Reason: Specialty Services Required   Requested Specialty: Home Health Services   Number of Visits Requested: 1     Diet general     Diet general     Remove dressing in 48 hours     Change dressing (specify)   Order Comments: Dressing change:  On POD 3- recommend dressing change via Home Health     Call MD for:  temperature >100.4     Call MD for:  persistent nausea and vomiting     Call MD for:  severe uncontrolled pain     Call MD for:  difficulty breathing, headache or visual disturbances     Call MD for:  redness, tenderness, or signs of infection (pain, swelling, redness, odor or green/yellow discharge around incision site)     Call MD for:  hives     Call MD for:  persistent dizziness or light-headedness     Call MD for:  extreme fatigue     Remove dressing in 48 hours     Change dressing (specify)   Order Comments: Dressing change:  On POD 3- recommend dressing change via Home Health     Call MD for:  temperature >100.4     Call MD for:  persistent nausea and vomiting     Call MD for:  severe uncontrolled pain     Call MD for:  difficulty breathing, headache or visual disturbances     Call MD for:  redness, tenderness, or signs of infection (pain, swelling, redness, odor or green/yellow discharge around incision site)     Call MD for:  hives     Call MD for:  persistent dizziness or light-headedness     Call MD for:  extreme fatigue      Contact information for follow-up providers       WENDY Baez Follow up in 13 day(s).    Specialties: Family Medicine, Emergency Medicine, Orthopedic Surgery  Why: July 10,2023 0820  Contact information:  [address] [place] [address]  [facility] [place] [address]  [phone]                       Contact information for after-discharge care       Destination       [facility] .    Service: Skilled Nursing  Contact information:  [address]  [facility] [id number] [phone]

## 2023-06-30 NOTE — PT/OT/SLP PROGRESS
Physical Therapy Treatment    Patient Name:  Sadie Martinez   MRN:  [id number]    Recommendations:     Discharge Recommendations: rehabilitation facility, nursing facility, skilled  Discharge Equipment Recommendations: none  Barriers to discharge: Decreased caregiver support    Assessment:     Sadie Martinez is a 58 y.o. female admitted with a medical diagnosis of Localized osteoarthritis of right knee.  She presents with the following impairments/functional limitations: impaired balance, impaired endurance, impaired functional mobility, decreased ROM, pain, orthopedic precautions. Pt demo some improvement with mobility but still not safe to d/c home alone.     Rehab Prognosis: Good; patient would benefit from acute skilled PT services to address these deficits and reach maximum level of function.    Recent Surgery: Procedure(s) (LRB):  MANIPULATION, KNEE (Right) 2 Days Post-Op    Plan:     During this hospitalization, patient to be seen BID to address the identified rehab impairments via gait training, therapeutic activities, therapeutic exercises and progress toward the following goals:    Plan of Care Expires:  07/26/23    Subjective     Chief Complaint: R TKA with revision  Patient/Family Comments/goals: agreeable to PT  Pain/Comfort:  Pain Rating 1: 7/10  Location - Side 1: Right  Location 1: knee  Pain Addressed 1: Reposition, Distraction  Pain Rating Post-Intervention 1: 5/10      Objective:     Communicated with STONE Mayfield RN prior to session.  Patient found sitting edge of bed with peripheral IV, knee immobilizer upon PT entry to room.     General Precautions: Standard, fall  Orthopedic Precautions: RLE weight bearing as tolerated  Braces: Knee immobilizer  Respiratory Status: Room air     Functional Mobility:  Transfers:     Sit to Stand:  contact guard assistance with rolling walker  Gait: 36ft x 2 with RW, slow debra, shortened R step length  Balance: Fair+ in stance      AM-PAC 6 CLICK  MOBILITY  Turning over in bed (including adjusting bedclothes, sheets and blankets)?: 3  Sitting down on and standing up from a chair with arms (e.g., wheelchair, bedside commode, etc.): 3  Moving from lying on back to sitting on the side of the bed?: 3  Moving to and from a bed to a chair (including a wheelchair)?: 3  Need to walk in hospital room?: 3  Climbing 3-5 steps with a railing?: 1  Basic Mobility Total Score: 16       Treatment & Education:  Bilateral lower extremity exercise x 15 reps: ankle pumps, Quad sets, glut sets, hip abduction/adduction, and straight leg raises with active assist ROM, verbal cues for sequencing and safety, and tactile cues     Patient left up in chair with all lines intact and call button in reach..    GOALS:   Multidisciplinary Problems       Physical Therapy Goals          Problem: Physical Therapy    Goal Priority Disciplines Outcome Goal Variances Interventions   Physical Therapy Goal     PT, PT/OT Ongoing, Progressing     Description: Short Term Goals to be met by: 23    Patient will increase functional independence with mobility by performin. Supine to sit with Modified [place]  2. Sit to stand transfer with Modified [place]  3. Bed to chair transfer with Modified [place] using Rolling Walker, WBAT R LE with immobilizer  4. Gait  x 100 feet with Modified [place] using Rolling Walker, WBAT R LE with immobilizer  5. Lower extremity exercise program x30 reps per handout, with assistance as needed with immobilizer  6. Negotiate 3 steps with 1 rail with CGA with immobilizer    Long Term Goals to be met by: 23    Pt will regain full independent functional mobility to return to prior activities of daily living.                        Time Tracking:     PT Received On: 23  PT Start Time: 957     PT Stop Time:   PT Total Time (min): 38 min     Billable Minutes: Gait Training 15 and Therapeutic Exercise 15    Treatment Type:  Treatment  PT/PTA: PT     Number of PTA visits since last PT visit: 0     06/30/2023

## 2023-06-30 NOTE — NURSING
Report called to WILLIAM Church. Pt will leave with her daughter to head that way after 1700 when she gets off.

## 2023-06-30 NOTE — PLAN OF CARE
Problem: Adult Inpatient Plan of Care  Goal: Plan of Care Review  Outcome: Ongoing, Progressing  Flowsheets (Taken 6/30/2023 1700)  Plan of Care Reviewed With: patient  Goal: Patient-Specific Goal (Individualized)  Outcome: Ongoing, Progressing  Flowsheets (Taken 6/30/2023 1700)  Anxieties, Fears or Concerns: pain  Individualized Care Needs: pain  Goal: Absence of Hospital-Acquired Illness or Injury  Outcome: Ongoing, Progressing  Goal: Optimal Comfort and Wellbeing  Outcome: Ongoing, Progressing  Goal: Readiness for Transition of Care  Outcome: Ongoing, Progressing

## 2023-07-01 LAB
GLUCOSE SERPL-MCNC: 148 MG/DL (ref 70–105)
GLUCOSE SERPL-MCNC: 151 MG/DL (ref 70–105)
GLUCOSE SERPL-MCNC: 162 MG/DL (ref 70–105)
GLUCOSE SERPL-MCNC: 185 MG/DL (ref 70–105)

## 2023-07-01 PROCEDURE — 63600175 PHARM REV CODE 636 W HCPCS: Performed by: NURSE PRACTITIONER

## 2023-07-01 PROCEDURE — 11000004 HC SNF PRIVATE

## 2023-07-01 PROCEDURE — 27000944

## 2023-07-01 PROCEDURE — 25000003 PHARM REV CODE 250: Performed by: NURSE PRACTITIONER

## 2023-07-01 PROCEDURE — 82962 GLUCOSE BLOOD TEST: CPT

## 2023-07-01 PROCEDURE — 27000982 HC MATTRESS, MATRIX LAL RENTAL

## 2023-07-01 RX ADMIN — ASPIRIN 81 MG: 81 TABLET, COATED ORAL at 08:07

## 2023-07-01 RX ADMIN — MUPIROCIN: 20 OINTMENT TOPICAL at 08:07

## 2023-07-01 RX ADMIN — HYDROCODONE BITARTRATE AND ACETAMINOPHEN 1 TABLET: 7.5; 325 TABLET ORAL at 07:07

## 2023-07-01 RX ADMIN — HYDROCODONE BITARTRATE AND ACETAMINOPHEN 1 TABLET: 7.5; 325 TABLET ORAL at 12:07

## 2023-07-01 RX ADMIN — HYDROCHLOROTHIAZIDE 25 MG: 12.5 TABLET ORAL at 08:07

## 2023-07-01 RX ADMIN — ATORVASTATIN CALCIUM 40 MG: 40 TABLET, FILM COATED ORAL at 08:07

## 2023-07-01 RX ADMIN — HYDROCODONE BITARTRATE AND ACETAMINOPHEN 1 TABLET: 7.5; 325 TABLET ORAL at 05:07

## 2023-07-01 RX ADMIN — ENOXAPARIN SODIUM 60 MG: 60 INJECTION SUBCUTANEOUS at 08:07

## 2023-07-01 RX ADMIN — GLIPIZIDE 10 MG: 2.5 TABLET, EXTENDED RELEASE ORAL at 08:07

## 2023-07-01 RX ADMIN — SENNOSIDES 8.6 MG: 8.6 TABLET, FILM COATED ORAL at 08:07

## 2023-07-01 RX ADMIN — PANTOPRAZOLE SODIUM 40 MG: 40 TABLET, DELAYED RELEASE ORAL at 08:07

## 2023-07-01 RX ADMIN — AMLODIPINE BESYLATE 5 MG: 5 TABLET ORAL at 08:07

## 2023-07-01 NOTE — PLAN OF CARE
Problem: Adult Inpatient Plan of Care  Goal: Plan of Care Review  Outcome: Ongoing, Progressing  Goal: Patient-Specific Goal (Individualized)  Outcome: Ongoing, Progressing  Goal: Absence of Hospital-Acquired Illness or Injury  Outcome: Ongoing, Progressing  Goal: Optimal Comfort and Wellbeing  Outcome: Ongoing, Progressing  Goal: Readiness for Transition of Care  Outcome: Ongoing, Progressing  Intervention: Mutually Develop Transition Plan  Flowsheets (Taken 7/1/2023 1503)  Transportation Anticipated: family or friend will provide     Problem: Diabetes Comorbidity  Goal: Blood Glucose Level Within Targeted Range  Outcome: Ongoing, Progressing  Intervention: Monitor and Manage Glycemia  Flowsheets (Taken 7/1/2023 1503)  Glycemic Management: blood glucose monitored

## 2023-07-01 NOTE — H&P
[facility] - Medical Surgical Unit  Hospital Medicine  History & Physical    Patient Name: Sadie Martinez  MRN: [id number]  Patient Class: IP- Swing  Admission Date: 6/30/2023  Attending Physician: Mani Higginbotham IV  Primary Care Provider: Indra Matos MD         Patient information was obtained from Patient Records and Patient Interview.     Subjective:     Principal Problem:S/P total knee replacement, right    Chief Complaint:   Chief Complaint   Patient presents with    Weakness        HPI: Chief complaint:  Right knee pain.     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  Patient states she injured her right knee at work in 2017.  States has had discomfort with it since.  More than a year ago she had a meniscus tear in right knee requiring a knee scope.  Has had continued pain leading to her knee surgery.  Denies any significant pain to her left knee.      Review of system:  See above.  Wears glasses without recent change in vision.  States also has some chronic back issues which she states initiated at her work.  Denies any snoring and states sleep issues seemed to come from her back and chronic knee pain.  Review of systems otherwise.     Past medical history:  -hypertension greater than 15 years.  -diabetes mellitus type 2 treated with oral agents  -diagnosed 2020 with breast cancer status post left mastectomy.  No chemotherapy or radiation needed.  Followed by Dr. Warner.     Past surgical history:  Right knee scope  Right total knee replacement this admission  Previous left mastectomy in 2020  Hysterectomy with bilateral salpingo-oophorectomy  Low back surgery     Patient list Mobic as an allergy     Social history:  Lives by herself  Has children that live in the area that could possibly help her as needed  Would like to go to swing bed following this admission  No history of tobacco use  Drink alcohol only  occasionally     Family history:  Mother had heart problems with heart attack starting her 60s     Health maintenance issues:  Primary care provider is Melanie Watts NP  Had flu shot last fall  No prior Pneumovax but recommended she get wanted age 63  Had mammogram in April  Last Pap smear 3 years earlier  Scheduled for colonoscopy next year, states prior colonoscopy showed an area they were concerned about infection and they found no polyps but told her to repeat it next year              Overview/Hospital Course:  06/26 Not much pain. Ortho note seen with need to return to surgery with abnormal position prosthesis. Discussed with Dr Warner and will stop her Arimidex for two weeks to lower risk of DVT. Discussed with pt and she understands. BP and BS Ok. Correct electrolytes.   06/27 Seen prior to surgery in good spirits. Seems medically stable to go. Plan rehab at [facility]. BP and BS good.      Past Medical History:   Diagnosis Date    Arthritis     Breast cancer     Left breast mastectomy 4/16/2020    Cancer     Diabetes mellitus, type 2     Hypertension        Past Surgical History:   Procedure Laterality Date    ARTHROPLASTY, KNEE, TOTAL, USING COMPUTER-ASSISTED NAVIGATION Right 6/26/2023    Procedure: ARTHROPLASTY, KNEE, TOTAL, USING COMPUTER-ASSISTED NAVIGATION;  Surgeon: Indra Matos MD;  Location: [facility] OR;  Service: Orthopedics;  Laterality: Right;    ARTHROSCOPIC CHONDROPLASTY OF KNEE JOINT Right 9/8/2021    Procedure: ARTHROSCOPY, KNEE, WITH CHONDROPLASTY;  Surgeon: Indra Matos MD;  Location: [facility] OR;  Service: Orthopedics;  Laterality: Right;    BACK SURGERY      CERVICAL BIOPSY  W/ LOOP ELECTRODE EXCISION      HYSTERECTOMY      KNEE ARTHROSCOPY W/ MENISCECTOMY Right 9/8/2021    Procedure: ARTHROSCOPY, KNEE, WITH MENISCECTOMY;  Surgeon: Indra Matos MD;  Location: [facility] OR;  Service: Orthopedics;  Laterality: Right;    KNEE ARTHROSCOPY W/ PLICA EXCISION Right  9/8/2021    Procedure: EXCISION, PLICA, KNEE, ARTHROSCOPIC;  Surgeon: Indra Matos MD;  Location: [facility]THO OR;  Service: Orthopedics;  Laterality: Right;    KNEE JOINT MANIPULATION Right 6/28/2023    Procedure: MANIPULATION, KNEE;  Surgeon: Indra Matos MD;  Location: [facility] ORTH[facility] OR;  Service: Orthopedics;  Laterality: Right;    MASTECTOMY, PARTIAL Left     TUBAL LIGATION         Review of patient's allergies indicates:   Allergen Reactions    Mobic [meloxicam] Swelling       Current Facility-Administered Medications on File Prior to Encounter   Medication    [DISCONTINUED] amLODIPine tablet 5 mg    [DISCONTINUED] aspirin chewable tablet 324 mg    [DISCONTINUED] atorvastatin tablet 40 mg    [DISCONTINUED] bisacodyL suppository 10 mg    [DISCONTINUED] calcium carbonate 200 mg calcium (500 mg) chewable tablet 500 mg    [DISCONTINUED] dextrose 10% bolus 125 mL 125 mL    [DISCONTINUED] dextrose 10% bolus 250 mL 250 mL    [DISCONTINUED] dextrose 40 % gel 15,000 mg    [DISCONTINUED] dextrose 40 % gel 30,000 mg    [DISCONTINUED] docusate sodium capsule 100 mg    [DISCONTINUED] docusate sodium capsule 100 mg    [DISCONTINUED] famotidine tablet 20 mg    [DISCONTINUED] glipiZIDE tablet 5 mg    [DISCONTINUED] glucagon (human recombinant) injection 1 mg    [DISCONTINUED] hydroCHLOROthiazide tablet 25 mg    [DISCONTINUED] insulin aspart U-100 injection 1-10 Units    [DISCONTINUED] mupirocin 2 % ointment 1 g    [DISCONTINUED] ondansetron disintegrating tablet 8 mg    [DISCONTINUED] oxyCODONE immediate release tablet 10 mg    [DISCONTINUED] oxyCODONE immediate release tablet 10 mg    [DISCONTINUED] oxyCODONE immediate release tablet 5 mg    [DISCONTINUED] polyethylene glycol packet 17 g    [DISCONTINUED] polyethylene glycol packet 34 g    [DISCONTINUED] pregabalin capsule 75 mg    [DISCONTINUED] senna tablet 8.6 mg    [DISCONTINUED] sodium chloride 0.9% flush 3 mL    [DISCONTINUED]  zolpidem tablet 5 mg     Current Outpatient Medications on File Prior to Encounter   Medication Sig    amLODIPine (NORVASC) 5 MG tablet Take 5 mg by mouth once daily.    anastrozole (ARIMIDEX) 1 mg Tab Take 1 mg by mouth once daily.    aspirin (ECOTRIN) 81 MG EC tablet Take 1 tablet (81 mg total) by mouth 2 (two) times a day.    calcium carbonate (OS-WILLIAM) 600 mg calcium (1,500 mg) Tab Take 600 mg by mouth once daily.    cyclobenzaprine (FLEXERIL) 10 MG tablet TAKE 1 TABLET BY MOUTH AT BEDTIME AS NEEDED FOR MUSCLE PAIN    gabapentin (NEURONTIN) 300 MG capsule Take 300 mg by mouth 3 (three) times daily.    glipiZIDE (GLUCOTROL) 10 MG tablet TAKE 1 TABLET BY MOUTH EVERY DAY BEFORE A MEAL    hydroCHLOROthiazide (HYDRODIURIL) 25 MG tablet Take 25 mg by mouth once daily.    metFORMIN (GLUCOPHAGE) 1000 MG tablet Take 1,000 mg by mouth 2 (two) times daily with meals.    ondansetron (ZOFRAN-ODT) 4 MG TbDL Take 1 tablet (4 mg total) by mouth every 6 (six) hours as needed (nausea).    ondansetron (ZOFRAN-ODT) 4 MG TbDL Take 2 tablets (8 mg total) by mouth every 8 (eight) hours as needed (Nausea).    oxyCODONE-acetaminophen (PERCOCET)  mg per tablet Take 1 tablet by mouth every 6 (six) hours as needed for Pain.    rosuvastatin (CRESTOR) 10 MG tablet Take 10 mg by mouth once daily.    senna-docusate 8.6-50 mg (PERICOLACE) 8.6-50 mg per tablet Take 1 tablet by mouth 2 (two) times daily.     Family History       Problem Relation (Age of Onset)    Dementia Father    Diabetes Mother    Heart attack Mother    Hypertension Mother    Pancreatic cancer Maternal Uncle    Stomach cancer Maternal Uncle    Stroke Father          Tobacco Use    Smoking status: Never    Smokeless tobacco: Never   Substance and Sexual Activity    Alcohol use: Not Currently    Drug use: Never    Sexual activity: Not Currently     Partners: Male     Review of Systems   Constitutional:  Negative for appetite change, fatigue and fever.    HENT: Negative.     Eyes: Negative.    Respiratory: Negative.     Cardiovascular: Negative.    Gastrointestinal: Negative.    Endocrine: Negative.    Musculoskeletal:  Positive for arthralgias.        Post-Operative Right Knee Pain   Neurological: Negative.    All other systems reviewed and are negative.  Objective:     Vital Signs (Most Recent):  Resp: 18 (06/30/23 2106) Vital Signs (24h Range):  Temp:  [97.6 °F (36.4 °C)-98.9 °F (37.2 °C)] 98.9 °F (37.2 °C)  Pulse:  [] 97  Resp:  [16-18] 18  SpO2:  [97 %-99 %] 98 %  BP: (125-135)/(64-76) 126/68        There is no height or weight on file to calculate BMI.     Physical Exam  Vitals and nursing note reviewed.   Constitutional:       General: She is not in acute distress.     Appearance: Normal appearance. She is normal weight. She is not ill-appearing, toxic-appearing or diaphoretic.   HENT:      Head: Normocephalic and atraumatic.      Right Ear: Ear canal and external ear normal.      Left Ear: Ear canal and external ear normal.      Nose: Nose normal.      Mouth/Throat:      Mouth: Mucous membranes are moist.      Pharynx: Oropharynx is clear.   Eyes:      Extraocular Movements: Extraocular movements intact.      Conjunctiva/sclera: Conjunctivae normal.      Pupils: Pupils are equal, round, and reactive to light.   Neck:      Vascular: No carotid bruit.   Cardiovascular:      Rate and Rhythm: Normal rate and regular rhythm.      Pulses: Normal pulses.      Heart sounds: Normal heart sounds. No murmur heard.  Pulmonary:      Effort: Pulmonary effort is normal.      Breath sounds: Normal breath sounds.   Abdominal:      General: Abdomen is flat. There is no distension.      Palpations: Abdomen is soft. There is no mass.      Tenderness: There is no abdominal tenderness.   Musculoskeletal:         General: Tenderness present.      Cervical back: Normal range of motion and neck supple. No rigidity or tenderness.      Comments: Right Knee Tender, Knee in  Immobilizer. No lower extremity edema. Distal pulses palpable to right and left feet.   Skin:     General: Skin is warm and dry.      Capillary Refill: Capillary refill takes less than 2 seconds.   Neurological:      General: No focal deficit present.      Mental Status: She is alert and oriented to person, place, and time. Mental status is at baseline.      Cranial Nerves: No cranial nerve deficit.   Psychiatric:         Mood and Affect: Mood normal.         Behavior: Behavior normal.         Thought Content: Thought content normal.         Judgment: Judgment normal.            CRANIAL NERVES     CN III, IV, VI   Pupils are equal, round, and reactive to light.     Significant Labs: All pertinent labs within the past 24 hours have been reviewed.    Significant Imaging: I have reviewed all pertinent imaging results/findings within the past 24 hours.    Assessment/Plan:     No notes have been filed under this hospital service.  Service: Hospital Medicine    VTE Risk Mitigation (From admission, onward)         Ordered     enoxaparin injection 60 mg  Daily         06/30/23 2119     Place SONI hose  Until discontinued         06/30/23 2119     IP VTE HIGH RISK PATIENT  Once         06/30/23 2119                           Jamir Chow NP  Department of Hospital Medicine  [facility] - Medical Surgical Unit

## 2023-07-01 NOTE — SUBJECTIVE & OBJECTIVE
Past Medical History:   Diagnosis Date    Arthritis     Breast cancer     Left breast mastectomy 4/16/2020    Cancer     Diabetes mellitus, type 2     Hypertension        Past Surgical History:   Procedure Laterality Date    ARTHROPLASTY, KNEE, TOTAL, USING COMPUTER-ASSISTED NAVIGATION Right 6/26/2023    Procedure: ARTHROPLASTY, KNEE, TOTAL, USING COMPUTER-ASSISTED NAVIGATION;  Surgeon: Indra Matos MD;  Location: [facility];  Service: Orthopedics;  Laterality: Right;    ARTHROSCOPIC CHONDROPLASTY OF KNEE JOINT Right 9/8/2021    Procedure: ARTHROSCOPY, KNEE, WITH CHONDROPLASTY;  Surgeon: Indra Matos MD;  Location: [facility] OR;  Service: Orthopedics;  Laterality: Right;    BACK SURGERY      CERVICAL BIOPSY  W/ LOOP ELECTRODE EXCISION      HYSTERECTOMY      KNEE ARTHROSCOPY W/ MENISCECTOMY Right 9/8/2021    Procedure: ARTHROSCOPY, KNEE, WITH MENISCECTOMY;  Surgeon: Indra Matos MD;  Location: [facility];  Service: Orthopedics;  Laterality: Right;    KNEE ARTHROSCOPY W/ PLICA EXCISION Right 9/8/2021    Procedure: EXCISION, PLICA, KNEE, ARTHROSCOPIC;  Surgeon: Indra Matos MD;  Location: [facility] OR;  Service: Orthopedics;  Laterality: Right;    KNEE JOINT MANIPULATION Right 6/28/2023    Procedure: MANIPULATION, KNEE;  Surgeon: Indra Matos MD;  Location: [facility];  Service: Orthopedics;  Laterality: Right;    MASTECTOMY, PARTIAL Left     TUBAL LIGATION         Review of patient's allergies indicates:   Allergen Reactions    Mobic [meloxicam] Swelling       Current Facility-Administered Medications on File Prior to Encounter   Medication    [DISCONTINUED] amLODIPine tablet 5 mg    [DISCONTINUED] aspirin chewable tablet 324 mg    [DISCONTINUED] atorvastatin tablet 40 mg    [DISCONTINUED] bisacodyL suppository 10 mg    [DISCONTINUED] calcium carbonate 200 mg calcium (500 mg) chewable tablet 500 mg    [DISCONTINUED] dextrose 10% bolus 125 mL 125 mL    [DISCONTINUED] dextrose 10%  bolus 250 mL 250 mL    [DISCONTINUED] dextrose 40 % gel 15,000 mg    [DISCONTINUED] dextrose 40 % gel 30,000 mg    [DISCONTINUED] docusate sodium capsule 100 mg    [DISCONTINUED] docusate sodium capsule 100 mg    [DISCONTINUED] famotidine tablet 20 mg    [DISCONTINUED] glipiZIDE tablet 5 mg    [DISCONTINUED] glucagon (human recombinant) injection 1 mg    [DISCONTINUED] hydroCHLOROthiazide tablet 25 mg    [DISCONTINUED] insulin aspart U-100 injection 1-10 Units    [DISCONTINUED] mupirocin 2 % ointment 1 g    [DISCONTINUED] ondansetron disintegrating tablet 8 mg    [DISCONTINUED] oxyCODONE immediate release tablet 10 mg    [DISCONTINUED] oxyCODONE immediate release tablet 10 mg    [DISCONTINUED] oxyCODONE immediate release tablet 5 mg    [DISCONTINUED] polyethylene glycol packet 17 g    [DISCONTINUED] polyethylene glycol packet 34 g    [DISCONTINUED] pregabalin capsule 75 mg    [DISCONTINUED] senna tablet 8.6 mg    [DISCONTINUED] sodium chloride 0.9% flush 3 mL    [DISCONTINUED] zolpidem tablet 5 mg     Current Outpatient Medications on File Prior to Encounter   Medication Sig    amLODIPine (NORVASC) 5 MG tablet Take 5 mg by mouth once daily.    anastrozole (ARIMIDEX) 1 mg Tab Take 1 mg by mouth once daily.    aspirin (ECOTRIN) 81 MG EC tablet Take 1 tablet (81 mg total) by mouth 2 (two) times a day.    calcium carbonate (OS-WILLIAM) 600 mg calcium (1,500 mg) Tab Take 600 mg by mouth once daily.    cyclobenzaprine (FLEXERIL) 10 MG tablet TAKE 1 TABLET BY MOUTH AT BEDTIME AS NEEDED FOR MUSCLE PAIN    gabapentin (NEURONTIN) 300 MG capsule Take 300 mg by mouth 3 (three) times daily.    glipiZIDE (GLUCOTROL) 10 MG tablet TAKE 1 TABLET BY MOUTH EVERY DAY BEFORE A MEAL    hydroCHLOROthiazide (HYDRODIURIL) 25 MG tablet Take 25 mg by mouth once daily.    metFORMIN (GLUCOPHAGE) 1000 MG tablet Take 1,000 mg by mouth 2 (two) times daily with meals.    ondansetron (ZOFRAN-ODT) 4 MG TbDL Take 1 tablet (4 mg total) by mouth every 6  (six) hours as needed (nausea).    ondansetron (ZOFRAN-ODT) 4 MG TbDL Take 2 tablets (8 mg total) by mouth every 8 (eight) hours as needed (Nausea).    oxyCODONE-acetaminophen (PERCOCET)  mg per tablet Take 1 tablet by mouth every 6 (six) hours as needed for Pain.    rosuvastatin (CRESTOR) 10 MG tablet Take 10 mg by mouth once daily.    senna-docusate 8.6-50 mg (PERICOLACE) 8.6-50 mg per tablet Take 1 tablet by mouth 2 (two) times daily.     Family History       Problem Relation (Age of Onset)    Dementia Father    Diabetes Mother    Heart attack Mother    Hypertension Mother    Pancreatic cancer Maternal Uncle    Stomach cancer Maternal Uncle    Stroke Father          Tobacco Use    Smoking status: Never    Smokeless tobacco: Never   Substance and Sexual Activity    Alcohol use: Not Currently    Drug use: Never    Sexual activity: Not Currently     Partners: Male     Review of Systems   Constitutional:  Negative for appetite change, fatigue and fever.   HENT: Negative.     Eyes: Negative.    Respiratory: Negative.     Cardiovascular: Negative.    Gastrointestinal: Negative.    Endocrine: Negative.    Musculoskeletal:  Positive for arthralgias.        Post-Operative Right Knee Pain   Neurological: Negative.    All other systems reviewed and are negative.  Objective:     Vital Signs (Most Recent):  Resp: 18 (06/30/23 2106) Vital Signs (24h Range):  Temp:  [97.6 °F (36.4 °C)-98.9 °F (37.2 °C)] 98.9 °F (37.2 °C)  Pulse:  [] 97  Resp:  [16-18] 18  SpO2:  [97 %-99 %] 98 %  BP: (125-135)/(64-76) 126/68        There is no height or weight on file to calculate BMI.     Physical Exam  Vitals and nursing note reviewed.   Constitutional:       General: She is not in acute distress.     Appearance: Normal appearance. She is normal weight. She is not ill-appearing, toxic-appearing or diaphoretic.   HENT:      Head: Normocephalic and atraumatic.      Right Ear: Ear canal and external ear normal.      Left Ear: Ear  canal and external ear normal.      Nose: Nose normal.      Mouth/Throat:      Mouth: Mucous membranes are moist.      Pharynx: Oropharynx is clear.   Eyes:      Extraocular Movements: Extraocular movements intact.      Conjunctiva/sclera: Conjunctivae normal.      Pupils: Pupils are equal, round, and reactive to light.   Neck:      Vascular: No carotid bruit.   Cardiovascular:      Rate and Rhythm: Normal rate and regular rhythm.      Pulses: Normal pulses.      Heart sounds: Normal heart sounds. No murmur heard.  Pulmonary:      Effort: Pulmonary effort is normal.      Breath sounds: Normal breath sounds.   Abdominal:      General: Abdomen is flat. There is no distension.      Palpations: Abdomen is soft. There is no mass.      Tenderness: There is no abdominal tenderness.   Musculoskeletal:         General: Tenderness present.      Cervical back: Normal range of motion and neck supple. No rigidity or tenderness.      Comments: Right Knee Tender, Knee in Immobilizer. No lower extremity edema. Distal pulses palpable to right and left feet.   Skin:     General: Skin is warm and dry.      Capillary Refill: Capillary refill takes less than 2 seconds.   Neurological:      General: No focal deficit present.      Mental Status: She is alert and oriented to person, place, and time. Mental status is at baseline.      Cranial Nerves: No cranial nerve deficit.   Psychiatric:         Mood and Affect: Mood normal.         Behavior: Behavior normal.         Thought Content: Thought content normal.         Judgment: Judgment normal.            CRANIAL NERVES     CN III, IV, VI   Pupils are equal, round, and reactive to light.     Significant Labs: All pertinent labs within the past 24 hours have been reviewed.    Significant Imaging: I have reviewed all pertinent imaging results/findings within the past 24 hours.

## 2023-07-02 LAB
GLUCOSE SERPL-MCNC: 138 MG/DL (ref 70–105)
GLUCOSE SERPL-MCNC: 156 MG/DL (ref 70–105)

## 2023-07-02 PROCEDURE — 63600175 PHARM REV CODE 636 W HCPCS: Performed by: NURSE PRACTITIONER

## 2023-07-02 PROCEDURE — 27000935

## 2023-07-02 PROCEDURE — 25000003 PHARM REV CODE 250: Performed by: NURSE PRACTITIONER

## 2023-07-02 PROCEDURE — 11000004 HC SNF PRIVATE

## 2023-07-02 PROCEDURE — 82962 GLUCOSE BLOOD TEST: CPT

## 2023-07-02 RX ADMIN — ATORVASTATIN CALCIUM 40 MG: 40 TABLET, FILM COATED ORAL at 08:07

## 2023-07-02 RX ADMIN — HYDROCHLOROTHIAZIDE 25 MG: 12.5 TABLET ORAL at 08:07

## 2023-07-02 RX ADMIN — AMLODIPINE BESYLATE 5 MG: 5 TABLET ORAL at 08:07

## 2023-07-02 RX ADMIN — MUPIROCIN: 20 OINTMENT TOPICAL at 08:07

## 2023-07-02 RX ADMIN — ASPIRIN 81 MG: 81 TABLET, COATED ORAL at 08:07

## 2023-07-02 RX ADMIN — HYDROCODONE BITARTRATE AND ACETAMINOPHEN 1 TABLET: 7.5; 325 TABLET ORAL at 08:07

## 2023-07-02 RX ADMIN — SENNOSIDES 8.6 MG: 8.6 TABLET, FILM COATED ORAL at 08:07

## 2023-07-02 RX ADMIN — PANTOPRAZOLE SODIUM 40 MG: 40 TABLET, DELAYED RELEASE ORAL at 08:07

## 2023-07-02 RX ADMIN — ACETAMINOPHEN 650 MG: 325 TABLET ORAL at 12:07

## 2023-07-02 RX ADMIN — GLIPIZIDE 10 MG: 2.5 TABLET, EXTENDED RELEASE ORAL at 08:07

## 2023-07-02 RX ADMIN — ENOXAPARIN SODIUM 60 MG: 60 INJECTION SUBCUTANEOUS at 04:07

## 2023-07-03 LAB
ANION GAP SERPL CALCULATED.3IONS-SCNC: 11 MMOL/L (ref 7–16)
BASOPHILS # BLD AUTO: 0.04 K/UL (ref 0–0.2)
BASOPHILS NFR BLD AUTO: 0.8 % (ref 0–1)
BUN SERPL-MCNC: 9 MG/DL (ref 7–18)
BUN/CREAT SERPL: 8 (ref 6–20)
CALCIUM SERPL-MCNC: 9.4 MG/DL (ref 8.5–10.1)
CHLORIDE SERPL-SCNC: 97 MMOL/L (ref 98–107)
CO2 SERPL-SCNC: 30 MMOL/L (ref 21–32)
CREAT SERPL-MCNC: 1.13 MG/DL (ref 0.55–1.02)
DIFFERENTIAL METHOD BLD: ABNORMAL
EGFR (NO RACE VARIABLE) (RUSH/TITUS): 56 ML/MIN/1.73M2
EOSINOPHIL # BLD AUTO: 0.11 K/UL (ref 0–0.5)
EOSINOPHIL NFR BLD AUTO: 2.1 % (ref 1–4)
ERYTHROCYTE [DISTWIDTH] IN BLOOD BY AUTOMATED COUNT: 12.7 % (ref 11.5–14.5)
GLUCOSE SERPL-MCNC: 164 MG/DL (ref 70–105)
GLUCOSE SERPL-MCNC: 181 MG/DL (ref 70–105)
GLUCOSE SERPL-MCNC: 198 MG/DL (ref 70–105)
GLUCOSE SERPL-MCNC: 221 MG/DL (ref 74–106)
GLUCOSE SERPL-MCNC: 254 MG/DL (ref 70–105)
HCT VFR BLD AUTO: 30.6 % (ref 38–47)
HGB BLD-MCNC: 9.7 G/DL (ref 12–16)
LYMPHOCYTES # BLD AUTO: 1.92 K/UL (ref 1–4.8)
LYMPHOCYTES NFR BLD AUTO: 37.2 % (ref 27–41)
MAGNESIUM SERPL-MCNC: 1.7 MG/DL (ref 1.7–2.3)
MCH RBC QN AUTO: 27.5 PG (ref 27–31)
MCHC RBC AUTO-ENTMCNC: 31.7 G/DL (ref 32–36)
MCV RBC AUTO: 86.7 FL (ref 80–96)
MONOCYTES # BLD AUTO: 0.68 K/UL (ref 0–0.8)
MONOCYTES NFR BLD AUTO: 13.2 % (ref 2–6)
MPC BLD CALC-MCNC: 9.1 FL (ref 9.4–12.4)
NEUTROPHILS # BLD AUTO: 2.41 K/UL (ref 1.8–7.7)
NEUTROPHILS NFR BLD AUTO: 46.7 % (ref 53–65)
PLATELET # BLD AUTO: 354 K/UL (ref 150–400)
POTASSIUM SERPL-SCNC: 3.2 MMOL/L (ref 3.5–5.1)
RBC # BLD AUTO: 3.53 M/UL (ref 4.2–5.4)
SODIUM SERPL-SCNC: 135 MMOL/L (ref 136–145)
WBC # BLD AUTO: 5.16 K/UL (ref 4.5–11)

## 2023-07-03 PROCEDURE — 85025 COMPLETE CBC W/AUTO DIFF WBC: CPT | Performed by: FAMILY MEDICINE

## 2023-07-03 PROCEDURE — 83735 ASSAY OF MAGNESIUM: CPT | Performed by: FAMILY MEDICINE

## 2023-07-03 PROCEDURE — 97116 GAIT TRAINING THERAPY: CPT | Mod: CQ

## 2023-07-03 PROCEDURE — 82962 GLUCOSE BLOOD TEST: CPT

## 2023-07-03 PROCEDURE — 63600175 PHARM REV CODE 636 W HCPCS: Performed by: NURSE PRACTITIONER

## 2023-07-03 PROCEDURE — 97110 THERAPEUTIC EXERCISES: CPT | Mod: CQ

## 2023-07-03 PROCEDURE — 97165 OT EVAL LOW COMPLEX 30 MIN: CPT

## 2023-07-03 PROCEDURE — 25000003 PHARM REV CODE 250: Performed by: NURSE PRACTITIONER

## 2023-07-03 PROCEDURE — 25000003 PHARM REV CODE 250: Performed by: FAMILY MEDICINE

## 2023-07-03 PROCEDURE — 97161 PT EVAL LOW COMPLEX 20 MIN: CPT

## 2023-07-03 PROCEDURE — 80048 BASIC METABOLIC PNL TOTAL CA: CPT | Performed by: FAMILY MEDICINE

## 2023-07-03 PROCEDURE — 11000004 HC SNF PRIVATE

## 2023-07-03 PROCEDURE — 63600175 PHARM REV CODE 636 W HCPCS: Performed by: FAMILY MEDICINE

## 2023-07-03 RX ORDER — DEXTROSE MONOHYDRATE 100 MG/ML
12.5 INJECTION, SOLUTION INTRAVENOUS
Status: DISCONTINUED | OUTPATIENT
Start: 2023-07-03 | End: 2023-07-12 | Stop reason: HOSPADM

## 2023-07-03 RX ORDER — IBUPROFEN 200 MG
16 TABLET ORAL
Status: DISCONTINUED | OUTPATIENT
Start: 2023-07-03 | End: 2023-07-12 | Stop reason: HOSPADM

## 2023-07-03 RX ORDER — GLIPIZIDE 5 MG/1
5 TABLET ORAL
Status: DISCONTINUED | OUTPATIENT
Start: 2023-07-03 | End: 2023-07-05

## 2023-07-03 RX ORDER — IBUPROFEN 200 MG
24 TABLET ORAL
Status: DISCONTINUED | OUTPATIENT
Start: 2023-07-03 | End: 2023-07-12 | Stop reason: HOSPADM

## 2023-07-03 RX ORDER — GLUCAGON 1 MG
1 KIT INJECTION
Status: DISCONTINUED | OUTPATIENT
Start: 2023-07-03 | End: 2023-07-12 | Stop reason: HOSPADM

## 2023-07-03 RX ORDER — DEXTROSE MONOHYDRATE 100 MG/ML
25 INJECTION, SOLUTION INTRAVENOUS
Status: DISCONTINUED | OUTPATIENT
Start: 2023-07-03 | End: 2023-07-12 | Stop reason: HOSPADM

## 2023-07-03 RX ORDER — INSULIN ASPART 100 [IU]/ML
1-10 INJECTION, SOLUTION INTRAVENOUS; SUBCUTANEOUS
Status: DISCONTINUED | OUTPATIENT
Start: 2023-07-03 | End: 2023-07-12 | Stop reason: HOSPADM

## 2023-07-03 RX ADMIN — POTASSIUM BICARBONATE 10 MEQ: 391 TABLET, EFFERVESCENT ORAL at 09:07

## 2023-07-03 RX ADMIN — ASPIRIN 81 MG: 81 TABLET, COATED ORAL at 09:07

## 2023-07-03 RX ADMIN — AMLODIPINE BESYLATE 5 MG: 5 TABLET ORAL at 09:07

## 2023-07-03 RX ADMIN — SENNOSIDES 8.6 MG: 8.6 TABLET, FILM COATED ORAL at 09:07

## 2023-07-03 RX ADMIN — MUPIROCIN: 20 OINTMENT TOPICAL at 09:07

## 2023-07-03 RX ADMIN — HYDROCODONE BITARTRATE AND ACETAMINOPHEN 1 TABLET: 7.5; 325 TABLET ORAL at 04:07

## 2023-07-03 RX ADMIN — HYDROCHLOROTHIAZIDE 25 MG: 12.5 TABLET ORAL at 09:07

## 2023-07-03 RX ADMIN — ATORVASTATIN CALCIUM 40 MG: 40 TABLET, FILM COATED ORAL at 09:07

## 2023-07-03 RX ADMIN — ENOXAPARIN SODIUM 60 MG: 60 INJECTION SUBCUTANEOUS at 04:07

## 2023-07-03 RX ADMIN — HYDROCODONE BITARTRATE AND ACETAMINOPHEN 1 TABLET: 7.5; 325 TABLET ORAL at 09:07

## 2023-07-03 RX ADMIN — INSULIN ASPART 2 UNITS: 100 INJECTION, SOLUTION INTRAVENOUS; SUBCUTANEOUS at 11:07

## 2023-07-03 RX ADMIN — GLIPIZIDE 10 MG: 2.5 TABLET, EXTENDED RELEASE ORAL at 09:07

## 2023-07-03 RX ADMIN — POTASSIUM BICARBONATE 10 MEQ: 391 TABLET, EFFERVESCENT ORAL at 10:07

## 2023-07-03 RX ADMIN — HYDROCODONE BITARTRATE AND ACETAMINOPHEN 1 TABLET: 7.5; 325 TABLET ORAL at 02:07

## 2023-07-03 RX ADMIN — HYDROCODONE BITARTRATE AND ACETAMINOPHEN 1 TABLET: 7.5; 325 TABLET ORAL at 11:07

## 2023-07-03 RX ADMIN — PANTOPRAZOLE SODIUM 40 MG: 40 TABLET, DELAYED RELEASE ORAL at 09:07

## 2023-07-03 NOTE — PLAN OF CARE
[facility] - Medical Surgical Unit  Initial Discharge Assessment       Primary Care Provider: Indra Matos MD    Admission Diagnosis: Generalized weakness [R53.1]    Admission Date: 6/30/2023  Expected Discharge Date:     Transition of Care Barriers: (P) None    Payor: HU[employer] MANAGED MEDICARE / Plan: HU[employer] MEDICARE PPO / Product Type: Medicare Advantage /     Extended Emergency Contact Information  Primary Emergency Contact: Kavya Martinez  Mobile Phone: [phone]  Relation: Daughter   needed? No    Discharge Plan A: (P) Home, Home Health  Discharge Plan B: (P) Home Health, Home with family      [id number] - [place], [place] - [address]  [address]  [place] [address]  Phone: [phone] Fax: [phone]    [id number] - [place], [place] - [address]  [address]  [place] [address]  Phone: [phone] Fax: [phone]    [facility], [place] - [address]  [address]  [place] [address]  Phone: [phone] Fax: [phone]      Initial Assessment (most recent)       Adult Discharge Assessment - 07/03/23 1104          Discharge Assessment    Assessment Type Discharge Planning Assessment (P)      Confirmed/corrected address, phone number and insurance Yes (P)      Confirmed Demographics Correct on Facesheet (P)      Source of Information patient (P)      Contact Name/Number Kavya Martinez [phone] (P)      People in Home alone (P)      Do you expect to return to your current living situation? Yes (P)      Do you have help at home or someone to help you manage your care at home? Yes (P)      Who are your caregiver(s) and their phone number(s)? Kavya Martinez (P)      Current cognitive status: Alert/Oriented (P)      Walking or Climbing Stairs ambulation difficulty, requires equipment (P)      Home Layout Able to live on 1st floor (P)      Equipment  Currently Used at Home cane, straight;crutches, axillary;walker, rolling;glucometer (P)      Patient currently being followed by outpatient case management? No (P)      Do you currently have service(s) that help you manage your care at home? No (P)      Who is going to help you get home at discharge? Sajan (P)      How do you get to doctors appointments? car, drives self;family or friend will provide (P)      Discharge Plan A Home;Home Health (P)      Discharge Plan B Home Health;Home with family (P)      DME Needed Upon Discharge  none (P)      Discharge Plan discussed with: Patient (P)      Transition of Care Barriers None (P)                    Spoke with Ms. Martinez today.  She states she lives alone.  She has a cane, crutches, glucometer and a walker at home.  She doesn't use the crutches r/t s/p mastectomy.  She has used [facility] in the past and wishes to resume services with them at discharge.

## 2023-07-03 NOTE — PLAN OF CARE
Problem: Occupational Therapy  Goal: Occupational Therapy Goal  Description: ST week    Pt will bathe self with SBA  Pt will perform LB dressing with SBA\  Pt will transfer toilet/BSC with mod I  Pt will perform standing task x 8 min with standby assistance      LT weeks  1. Pt will achieve max level of independence with self care.    Outcome: Ongoing, Progressing

## 2023-07-03 NOTE — PLAN OF CARE
Problem: Physical Therapy  Goal: Physical Therapy Goal  Description: Short Term Goals  Ambulate SBA - 200 feet with rolling walker assistive device.   Supine to sit stand-by  Sit to stand stand-by  SPT stand-by  0-100 knee flexion right le to allow normal sit to stand  5. Independent with HEP    Long Term Goals  Ambulate SBA - 400 feet with rolling walker assistive device.   Supine to sit independent without device  Sit to stand independent without device  SPT independent without device  Needed equipment for home.         Outcome: Ongoing, Progressing

## 2023-07-03 NOTE — PT/OT/SLP EVAL
Physical Therapy Evaluation    Patient Name:  Sadie Martinez   MRN:  [id number]    Recommendations:     Discharge Recommendations: home, home with home health   Discharge Equipment Recommendations: none   Barriers to discharge: None    Assessment:     Sadie Martinez is a 58 y.o. female admitted with a medical diagnosis of S/P total knee replacement, right.  She presents with the following impairments/functional limitations: weakness, decreased ROM, pain, orthopedic precautions, impaired functional mobility Patient with slightly limited mobility due to generalized weakness. Patient with TKR 6/26/23 then had a revision due to poly shift on 6/28/23. Physician wanting to maintain immobilization for a week from poly exchange. Will start flexion 7/5/23 if cleared by Dr. THUY Matos. Will work on gait and strengthening while immobilized until that point. .    Rehab Prognosis: Good; patient would benefit from acute skilled PT services to address these deficits and reach maximum level of function.    Recent Surgery: * No surgery found *      Plan:     During this hospitalization, patient to be seen BID to address the identified rehab impairments via gait training, therapeutic activities, therapeutic exercises and progress toward the following goals:    Plan of Care Expires:  08/03/23    Subjective     Chief Complaint: post op pain  Patient/Family Comments/goals: Patient has been compliant with HEP. Hasnt been oob in chair since admission to .   Pain/Comfort:  Pain Rating 1: 2/10  Location - Side 1: Right  Location 1: knee  Pain Addressed 1: Pre-medicate for activity, Cessation of Activity    Patients cultural, spiritual, Shinto conflicts given the current situation: no    Living Environment:  Lives alone, 3 steps entering home  Prior to admission, patients level of function was independent.  Equipment used at home: cane, straight, walker, rolling.  DME owned (not currently used): rolling walker.  Upon discharge, patient will  have assistance from daughter.    Objective:     Communicated with nurse prior to session.  Patient found supine with    upon PT entry to room.    General Precautions: Standard, fall  Orthopedic Precautions:RLE weight bearing as tolerated   Braces: Knee immobilizer  Respiratory Status: Room air    Exams:  RLE ROM: knee immobilizer to right knee  RLE Strength: 3/5  LLE ROM: WNL  LLE Strength: WNL    Functional Mobility:  Bed Mobility:     Supine to Sit: minimum assistance  Sit to Supine: minimum assistance  Transfers:     Sit to Stand:  contact guard assistance with rolling walker  Gait: ambulated 100 feet with rolling walker, step to gait, 75% decreased debra      AM-PAC 6 CLICK MOBILITY  Total Score:18       Treatment & Education:  Aps, qs, slr 5x10    Patient left up in chair with call button in reach.    GOALS:   Multidisciplinary Problems       Physical Therapy Goals          Problem: Physical Therapy    Goal Priority Disciplines Outcome Goal Variances Interventions   Physical Therapy Goal     PT, PT/OT Ongoing, Progressing     Description: Short Term Goals  Ambulate SBA - 200 feet with rolling walker assistive device.   Supine to sit stand-by  Sit to stand stand-by  SPT stand-by  0-100 knee flexion right le to allow normal sit to stand  5. Independent with HEP    Long Term Goals  Ambulate SBA - 400 feet with rolling walker assistive device.   Supine to sit independent without device  Sit to stand independent without device  SPT independent without device  Needed equipment for home.                              History:     Past Medical History:   Diagnosis Date    Arthritis     Breast cancer     Left breast mastectomy 4/16/2020    Cancer     Diabetes mellitus, type 2     Hypertension        Past Surgical History:   Procedure Laterality Date    ARTHROPLASTY, KNEE, TOTAL, USING COMPUTER-ASSISTED NAVIGATION Right 6/26/2023    Procedure: ARTHROPLASTY, KNEE, TOTAL, USING COMPUTER-ASSISTED NAVIGATION;  Surgeon:  Indra Matos MD;  Location: [facility];  Service: Orthopedics;  Laterality: Right;    ARTHROSCOPIC CHONDROPLASTY OF KNEE JOINT Right 9/8/2021    Procedure: ARTHROSCOPY, KNEE, WITH CHONDROPLASTY;  Surgeon: Indra Matos MD;  Location: [facility];  Service: Orthopedics;  Laterality: Right;    BACK SURGERY      CERVICAL BIOPSY  W/ LOOP ELECTRODE EXCISION      HYSTERECTOMY      KNEE ARTHROSCOPY W/ MENISCECTOMY Right 9/8/2021    Procedure: ARTHROSCOPY, KNEE, WITH MENISCECTOMY;  Surgeon: Indra Matos MD;  Location: [facility];  Service: Orthopedics;  Laterality: Right;    KNEE ARTHROSCOPY W/ PLICA EXCISION Right 9/8/2021    Procedure: EXCISION, PLICA, KNEE, ARTHROSCOPIC;  Surgeon: Indra Matos MD;  Location: [facility];  Service: Orthopedics;  Laterality: Right;    KNEE JOINT MANIPULATION Right 6/28/2023    Procedure: MANIPULATION, KNEE;  Surgeon: Indra Matos MD;  Location: [facility];  Service: Orthopedics;  Laterality: Right;    MASTECTOMY, PARTIAL Left     TUBAL LIGATION         Time Tracking:     PT Received On: 07/03/23  PT Start Time: 0910     PT Stop Time: 0930  PT Total Time (min): 20 min     Billable Minutes: Evaluation 20 07/03/2023

## 2023-07-03 NOTE — PT/OT/SLP EVAL
Occupational Therapy   Evaluation    Name: Sadie Martinez  MRN: [id number]  Admitting Diagnosis: S/P total knee replacement, right  Recent Surgery: R TKA on 6/26/23    Recommendations:     Discharge Recommendations: home health PT, home health OT  Discharge Equipment Recommendations:  to be determined by next level of care  Barriers to discharge:  Inaccessible home environment    Assessment:     Sadie Martinez is a 58 y.o. female with a medical diagnosis of S/P total knee replacement, right.  She presents with the following performance deficits affecting function: weakness, impaired endurance, impaired self care skills, impaired functional mobility, gait instability, impaired balance.  Patient s/p R TKR on 6/26/23 with revision secondary to poly shift on 6/28/23. Dr. THUY Matos ordered pt to maintain immobilization x 1 week from poly exchange.    Rehab Prognosis: Good; patient would benefit from acute skilled OT services to address these deficits and reach maximum level of function.       Plan:     Patient to be seen 5 x/week to address the above listed problems via self-care/home management, therapeutic activities, therapeutic exercises, neuromuscular re-education  Plan of Care Expires:    Plan of Care Reviewed with: patient    Subjective     Chief Complaint: s/p R TKA  Patient/Family Comments/goals: Patient would like to return home alone    Occupational Profile:  Living Environment: Patient lives in a 1 story home with 3 DURAN with a railing on R  Previous level of function: independent with ADLs and functional mobility  Equipment Used at Home: cane, straight, walker, rolling  Assistance upon Discharge: Patient will have assistance from her daughter    Pain/Comfort:  Pain Rating 1: 0/10    Patients cultural, spiritual, Scientology conflicts given the current situation: no    Objective:     Communicated with: Nurse prior to session.  Patient found up in chair with knee immobilizer upon OT entry to room.    General  Precautions: Standard, fall  Orthopedic Precautions: RLE weight bearing as tolerated  Braces: Knee immobilizer  Respiratory Status: Room air    Occupational Performance:    Bed Mobility:    DNT, patient up in chair upon therapist arrival    Functional Mobility/Transfers:  Patient completed Sit <> Stand Transfer with stand by assistance  with  rolling walker   Functional Mobility: Patient ambulated from chair to/from commode with standby assistance with rolling walker    Activities of Daily Living:  Upper Body Dressing: independence    Lower Body Dressing: minimum assistance secondary to knee immobilizer  Toileting: contact guard assistance with rolling walker    Cognitive/Visual Perceptual:  Cognitive/Psychosocial Skills:     -       Oriented to: Person, Place, Time, and Situation   -       Follows Commands/attention:Follows two-step commands  -       Safety awareness/insight to disability: intact     Physical Exam:  Upper Extremity Range of Motion:     -       Right Upper Extremity: WNL  -       Left Upper Extremity: WNL  Upper Extremity Strength:    -       Right Upper Extremity: WFL  -       Left Upper Extremity: WFL    AMPAC 6 Click ADL:  AMPAC Total Score: 21    Treatment & Education:  Pt educated on OT role/POC.   Importance of OOB activity with staff assistance.  Importance of sitting up in the chair throughout the day as tolerated, especially for meals   Safety during functional t/f and mobility  Importance of assisting with self-care activities    Patient left up in chair with call button in reach    GOALS:   Multidisciplinary Problems       Occupational Therapy Goals          Problem: Occupational Therapy    Goal Priority Disciplines Outcome Interventions   Occupational Therapy Goal     OT, PT/OT Ongoing, Progressing    Description: ST week    Pt will bathe self with SBA  Pt will perform LB dressing with SBA\  Pt will transfer toilet/BSC with mod I  Pt will perform standing task x 8 min with standby  assistance      LT weeks  1. Pt will achieve max level of independence with self care.                         History:     Past Medical History:   Diagnosis Date    Arthritis     Breast cancer     Left breast mastectomy 2020    Cancer     Diabetes mellitus, type 2     Hypertension          Past Surgical History:   Procedure Laterality Date    ARTHROPLASTY, KNEE, TOTAL, USING COMPUTER-ASSISTED NAVIGATION Right 2023    Procedure: ARTHROPLASTY, KNEE, TOTAL, USING COMPUTER-ASSISTED NAVIGATION;  Surgeon: Indra Matos MD;  Location: [facility];  Service: Orthopedics;  Laterality: Right;    ARTHROSCOPIC CHONDROPLASTY OF KNEE JOINT Right 2021    Procedure: ARTHROSCOPY, KNEE, WITH CHONDROPLASTY;  Surgeon: Indra Matos MD;  Location: [facility] OR;  Service: Orthopedics;  Laterality: Right;    BACK SURGERY      CERVICAL BIOPSY  W/ LOOP ELECTRODE EXCISION      HYSTERECTOMY      KNEE ARTHROSCOPY W/ MENISCECTOMY Right 2021    Procedure: ARTHROSCOPY, KNEE, WITH MENISCECTOMY;  Surgeon: Indra Matos MD;  Location: [facility];  Service: Orthopedics;  Laterality: Right;    KNEE ARTHROSCOPY W/ PLICA EXCISION Right 2021    Procedure: EXCISION, PLICA, KNEE, ARTHROSCOPIC;  Surgeon: Indra Matos MD;  Location: [facility];  Service: Orthopedics;  Laterality: Right;    KNEE JOINT MANIPULATION Right 2023    Procedure: MANIPULATION, KNEE;  Surgeon: Indra Matos MD;  Location: [facility];  Service: Orthopedics;  Laterality: Right;    MASTECTOMY, PARTIAL Left     TUBAL LIGATION         Time Tracking:     OT Date of Treatment: 23  OT Start Time: 1216  OT Stop Time: 1227  OT Total Time (min): 11 min    Billable Minutes:Evaluation 11    7/3/2023

## 2023-07-03 NOTE — PT/OT/SLP PROGRESS
Physical Therapy Treatment    Patient Name:  Sadie Martinez   MRN:  [id number]    Recommendations:     Discharge Recommendations: home health PT, home health OT  Discharge Equipment Recommendations: to be determined by next level of care  Barriers to discharge: Decreased caregiver support    Assessment:     Sadie Martinez is a 58 y.o. female admitted with a medical diagnosis of S/P total knee replacement, right.  She presents with the following impairments/functional limitations: weakness, impaired endurance, impaired self care skills, impaired functional mobility, gait instability, impaired balance Patient reports feeling good and agreeable to perform therapy.    Rehab Prognosis: Good; patient would benefit from acute skilled PT services to address these deficits and reach maximum level of function.    Recent Surgery: * No surgery found *      Plan:     During this hospitalization, patient to be seen BID (5 x/week) to address the identified rehab impairments via gait training, therapeutic activities, therapeutic exercises and progress toward the following goals:    Plan of Care Expires:  08/03/23    Subjective     Chief Complaint: weakness  Patient/Family Comments/goals: return home  Pain/Comfort:  Pain Rating 1: 0/10      Objective:     Communicated with PT prior to session.  Patient found up in chair with   upon PT entry to room.     General Precautions: Standard, fall  Orthopedic Precautions: RLE weight bearing as tolerated  Braces: Knee immobilizer  Respiratory Status: Room air     Functional Mobility:  Transfers:     Sit to Stand:  modified independence with rolling walker  Gait: Patient ambulated 140' with RW and supervision with no LOB      AM-PAC 6 CLICK MOBILITY  Turning over in bed (including adjusting bedclothes, sheets and blankets)?: 3  Sitting down on and standing up from a chair with arms (e.g., wheelchair, bedside commode, etc.): 4  Moving from lying on back to sitting on the side of the bed?:  3  Moving to and from a bed to a chair (including a wheelchair)?: 3  Need to walk in hospital room?: 4  Climbing 3-5 steps with a railing?: 3  Basic Mobility Total Score: 20       Treatment & Education:  Ambulation - 140'   Ankle pumps - 30 reps  Quad sets - 30 reps   SLR - 30 reps     Patient left up in chair with call button in reach..    GOALS:   Multidisciplinary Problems       Physical Therapy Goals          Problem: Physical Therapy    Goal Priority Disciplines Outcome Goal Variances Interventions   Physical Therapy Goal     PT, PT/OT Ongoing, Progressing     Description: Short Term Goals  Ambulate SBA - 200 feet with rolling walker assistive device.   Supine to sit stand-by  Sit to stand stand-by  SPT stand-by  0-100 knee flexion right le to allow normal sit to stand  5. Independent with HEP    Long Term Goals  Ambulate SBA - 400 feet with rolling walker assistive device.   Supine to sit independent without device  Sit to stand independent without device  SPT independent without device  Needed equipment for home.                              Time Tracking:     PT Received On: 07/03/23  PT Start Time: 1228     PT Stop Time: 1308  PT Total Time (min): 40 min     Billable Minutes: Gait Training 25 and Therapeutic Exercise 15    Treatment Type: Treatment  PT/PTA: PTA     Number of PTA visits since last PT visit: 1   DAISY Geller  07/03/2023

## 2023-07-04 LAB
GLUCOSE SERPL-MCNC: 125 MG/DL (ref 70–105)
GLUCOSE SERPL-MCNC: 129 MG/DL (ref 70–105)
GLUCOSE SERPL-MCNC: 153 MG/DL (ref 70–105)
GLUCOSE SERPL-MCNC: 222 MG/DL (ref 70–105)

## 2023-07-04 PROCEDURE — 27000935

## 2023-07-04 PROCEDURE — 11000004 HC SNF PRIVATE

## 2023-07-04 PROCEDURE — 63600175 PHARM REV CODE 636 W HCPCS: Performed by: FAMILY MEDICINE

## 2023-07-04 PROCEDURE — 25000003 PHARM REV CODE 250: Performed by: NURSE PRACTITIONER

## 2023-07-04 PROCEDURE — 82962 GLUCOSE BLOOD TEST: CPT

## 2023-07-04 PROCEDURE — 25000003 PHARM REV CODE 250: Performed by: FAMILY MEDICINE

## 2023-07-04 PROCEDURE — 63600175 PHARM REV CODE 636 W HCPCS: Performed by: NURSE PRACTITIONER

## 2023-07-04 RX ADMIN — INSULIN ASPART 4 UNITS: 100 INJECTION, SOLUTION INTRAVENOUS; SUBCUTANEOUS at 11:07

## 2023-07-04 RX ADMIN — POTASSIUM BICARBONATE 10 MEQ: 391 TABLET, EFFERVESCENT ORAL at 08:07

## 2023-07-04 RX ADMIN — GLIPIZIDE 5 MG: 5 TABLET ORAL at 08:07

## 2023-07-04 RX ADMIN — INSULIN ASPART 2 UNITS: 100 INJECTION, SOLUTION INTRAVENOUS; SUBCUTANEOUS at 04:07

## 2023-07-04 RX ADMIN — SENNOSIDES 8.6 MG: 8.6 TABLET, FILM COATED ORAL at 08:07

## 2023-07-04 RX ADMIN — HYDROCHLOROTHIAZIDE 25 MG: 12.5 TABLET ORAL at 08:07

## 2023-07-04 RX ADMIN — HYDROCODONE BITARTRATE AND ACETAMINOPHEN 1 TABLET: 7.5; 325 TABLET ORAL at 08:07

## 2023-07-04 RX ADMIN — MUPIROCIN: 20 OINTMENT TOPICAL at 08:07

## 2023-07-04 RX ADMIN — HYDROCODONE BITARTRATE AND ACETAMINOPHEN 1 TABLET: 7.5; 325 TABLET ORAL at 01:07

## 2023-07-04 RX ADMIN — HYDROCODONE BITARTRATE AND ACETAMINOPHEN 1 TABLET: 7.5; 325 TABLET ORAL at 06:07

## 2023-07-04 RX ADMIN — PANTOPRAZOLE SODIUM 40 MG: 40 TABLET, DELAYED RELEASE ORAL at 08:07

## 2023-07-04 RX ADMIN — ATORVASTATIN CALCIUM 40 MG: 40 TABLET, FILM COATED ORAL at 08:07

## 2023-07-04 RX ADMIN — ENOXAPARIN SODIUM 60 MG: 60 INJECTION SUBCUTANEOUS at 04:07

## 2023-07-04 RX ADMIN — AMLODIPINE BESYLATE 5 MG: 5 TABLET ORAL at 08:07

## 2023-07-04 RX ADMIN — ASPIRIN 81 MG: 81 TABLET, COATED ORAL at 08:07

## 2023-07-05 LAB
GLUCOSE SERPL-MCNC: 154 MG/DL (ref 70–105)
GLUCOSE SERPL-MCNC: 161 MG/DL (ref 70–105)
GLUCOSE SERPL-MCNC: 173 MG/DL (ref 70–105)
GLUCOSE SERPL-MCNC: 295 MG/DL (ref 70–105)

## 2023-07-05 PROCEDURE — 99307 PR NURSING FAC CARE, SUBSEQ, IMPROVING: ICD-10-PCS | Mod: ,,, | Performed by: NURSE PRACTITIONER

## 2023-07-05 PROCEDURE — 97110 THERAPEUTIC EXERCISES: CPT

## 2023-07-05 PROCEDURE — 82962 GLUCOSE BLOOD TEST: CPT

## 2023-07-05 PROCEDURE — 97530 THERAPEUTIC ACTIVITIES: CPT | Mod: CQ

## 2023-07-05 PROCEDURE — 63600175 PHARM REV CODE 636 W HCPCS: Performed by: NURSE PRACTITIONER

## 2023-07-05 PROCEDURE — 25000003 PHARM REV CODE 250: Performed by: NURSE PRACTITIONER

## 2023-07-05 PROCEDURE — 63600175 PHARM REV CODE 636 W HCPCS: Performed by: FAMILY MEDICINE

## 2023-07-05 PROCEDURE — 11000004 HC SNF PRIVATE

## 2023-07-05 PROCEDURE — 25000003 PHARM REV CODE 250: Performed by: FAMILY MEDICINE

## 2023-07-05 PROCEDURE — 27000982 HC MATTRESS, MATRIX LAL RENTAL

## 2023-07-05 PROCEDURE — 99307 SBSQ NF CARE SF MDM 10: CPT | Mod: ,,, | Performed by: NURSE PRACTITIONER

## 2023-07-05 PROCEDURE — 27000944

## 2023-07-05 PROCEDURE — 97116 GAIT TRAINING THERAPY: CPT

## 2023-07-05 RX ORDER — POTASSIUM CHLORIDE 20 MEQ/1
20 TABLET, EXTENDED RELEASE ORAL ONCE
Status: COMPLETED | OUTPATIENT
Start: 2023-07-05 | End: 2023-07-05

## 2023-07-05 RX ADMIN — AMLODIPINE BESYLATE 5 MG: 5 TABLET ORAL at 08:07

## 2023-07-05 RX ADMIN — POTASSIUM CHLORIDE 20 MEQ: 1500 TABLET, EXTENDED RELEASE ORAL at 08:07

## 2023-07-05 RX ADMIN — ATORVASTATIN CALCIUM 40 MG: 40 TABLET, FILM COATED ORAL at 08:07

## 2023-07-05 RX ADMIN — ASPIRIN 81 MG: 81 TABLET, COATED ORAL at 08:07

## 2023-07-05 RX ADMIN — HYDROCODONE BITARTRATE AND ACETAMINOPHEN 1 TABLET: 7.5; 325 TABLET ORAL at 01:07

## 2023-07-05 RX ADMIN — ENOXAPARIN SODIUM 60 MG: 60 INJECTION SUBCUTANEOUS at 05:07

## 2023-07-05 RX ADMIN — HYDROCHLOROTHIAZIDE 25 MG: 12.5 TABLET ORAL at 08:07

## 2023-07-05 RX ADMIN — SENNOSIDES 8.6 MG: 8.6 TABLET, FILM COATED ORAL at 08:07

## 2023-07-05 RX ADMIN — INSULIN ASPART 2 UNITS: 100 INJECTION, SOLUTION INTRAVENOUS; SUBCUTANEOUS at 06:07

## 2023-07-05 RX ADMIN — HYDROCODONE BITARTRATE AND ACETAMINOPHEN 1 TABLET: 7.5; 325 TABLET ORAL at 06:07

## 2023-07-05 RX ADMIN — GLIPIZIDE 5 MG: 5 TABLET ORAL at 08:07

## 2023-07-05 RX ADMIN — HYDROCODONE BITARTRATE AND ACETAMINOPHEN 1 TABLET: 7.5; 325 TABLET ORAL at 08:07

## 2023-07-05 RX ADMIN — MUPIROCIN: 20 OINTMENT TOPICAL at 08:07

## 2023-07-05 RX ADMIN — PANTOPRAZOLE SODIUM 40 MG: 40 TABLET, DELAYED RELEASE ORAL at 08:07

## 2023-07-05 NOTE — HOSPITAL COURSE
07/05/23 Admitted to swing bed on 06/30/23 s/p right TKR. She has ambulated 140 feet with rolling walker. Potassium is 3.2 this morning. Will give replacement po.    07/06/23 Awake and resting in bed . Ace wrap to right lower leg. Working with therapy. Ambulated 105 feet with rolling walker. States she stills feels unsteady. She lives alone and will be returning to her home alone. Continue therapy. Requests dulcolax.      07/10/23 Awake and sitting up on the side of the bed. Dressing intact to right knee. Staples intact. Incision has healed well. Will dc staples and steri strip area. Potassium is low at 3.1. Will replace orally. Follow up appt with Dr. Indra Matos on 07/14/23.  Chaim SHIRLEY. Talked with her this. Will dc home on Wednesday.    7/12/23 Spoke with patient about discharge today. Med Rec completed. Patient will continue PT and OT with [facility] and follow up with Indra Matos this Friday as scheduled. Also discussed following up with her PCP Melanie Watts in one week.

## 2023-07-05 NOTE — PT/OT/SLP PROGRESS
Occupational Therapy   Treatment    Name: Sadie Martinez  MRN: [id number]  Admitting Diagnosis:  S/P total knee replacement, right       Recommendations:     Discharge Recommendations: home health PT, home health OT  Discharge Equipment Recommendations:  to be determined by next level of care  Barriers to discharge:  Inaccessible home environment    Assessment:     Sadie Martinez is a 58 y.o. female with a medical diagnosis of S/P total knee replacement, right.  She presents with the following performance deficits affecting function are weakness, impaired endurance, impaired self care skills, impaired functional mobility, gait instability, impaired balance. Patient reports her R knee is sore secondary to removal of immobilizer today. Agreeable to perform BUE exercises in supine today.    Rehab Prognosis:  Good; patient would benefit from acute skilled OT services to address these deficits and reach maximum level of function.       Plan:     Patient to be seen 5 x/week to address the above listed problems via self-care/home management, therapeutic activities, therapeutic exercises, neuromuscular re-education  Plan of Care Expires:    Plan of Care Reviewed with: patient    Subjective     Chief Complaint: R knee pain  Patient/Family Comments/goals: Agreeable to OT tx  Pain/Comfort:       Objective:     Communicated with: PTA prior to session.  Patient found supine  upon OT entry to room.    General Precautions: Standard, fall    Orthopedic Precautions:RLE weight bearing as tolerated  Braces: Knee immobilizer  Respiratory Status: Room air     Occupational Performance:     Bed Mobility:         Functional Mobility/Transfers:      Activities of Daily Living:        [facility] 6 Click ADL:      Treatment & Education:  Pt performed B UE strengthening exercises to include:   Shoulder flexion   Shoulder horizontal ab/adduction   Elbow flexion   Elbow extension  All exercises performed 2x15 reps with green theraband.    Patient left  supine with call button in reach    GOALS:   Multidisciplinary Problems       Occupational Therapy Goals          Problem: Occupational Therapy    Goal Priority Disciplines Outcome Interventions   Occupational Therapy Goal     OT, PT/OT Ongoing, Progressing    Description: ST week    Pt will bathe self with SBA  Pt will perform LB dressing with SBA\  Pt will transfer toilet/BSC with mod I  Pt will perform standing task x 8 min with standby assistance      LT weeks  1. Pt will achieve max level of independence with self care.                         Time Tracking:     OT Date of Treatment: 23  OT Start Time: 1521  OT Stop Time: 1532  OT Total Time (min): 11 min    Billable Minutes:Therapeutic Exercise 11      2023

## 2023-07-05 NOTE — PT/OT/SLP PROGRESS
Physical Therapy Treatment    Patient Name:  Sadie Martinez   MRN:  [id number]    Recommendations:     Discharge Recommendations: home health PT, home health OT  Discharge Equipment Recommendations: to be determined by next level of care  Barriers to discharge: Decreased caregiver support    Assessment:     Sadie Martinez is a 58 y.o. female admitted with a medical diagnosis of S/P total knee replacement, right.  She presents with the following impairments/functional limitations: weakness, impaired endurance, impaired functional mobility, gait instability, impaired balance, decreased coordination, decreased lower extremity function, decreased ROM Patient reports having 8/10 pain in her knee, but patient was still willing to work with therapy for afternoon treatment.     Rehab Prognosis: Good; patient would benefit from acute skilled PT services to address these deficits and reach maximum level of function.    Recent Surgery: * No surgery found *      Plan:     During this hospitalization, patient to be seen BID (5 x/week) to address the identified rehab impairments via gait training, therapeutic activities, therapeutic exercises, neuromuscular re-education and progress toward the following goals:    Plan of Care Expires:  08/03/23    Subjective     Chief Complaint: weakness  Patient/Family Comments/goals: return home  Pain/Comfort:  Pain Rating 1: 8/10  Location - Side 1: Right  Location 1: knee  Pain Rating Post-Intervention 1: 8/10      Objective:     Communicated with PT prior to session.  Patient found  in restroom  with   upon PT entry to room.     General Precautions: Standard, fall  Orthopedic Precautions: RLE weight bearing as tolerated  Braces: Knee immobilizer  Respiratory Status: Room air     Functional Mobility:  Bed Mobility:     Sit to Supine: modified independence  Transfers:     Sit to Stand:  modified independence with rolling walker  Toilet Transfer: modified independence with  rolling walker  using   Step Transfer  Gait: Patient ambulated 105' with RW and supervision with no LOB      AM-PAC 6 CLICK MOBILITY  Turning over in bed (including adjusting bedclothes, sheets and blankets)?: 4  Sitting down on and standing up from a chair with arms (e.g., wheelchair, bedside commode, etc.): 4  Moving from lying on back to sitting on the side of the bed?: 3  Moving to and from a bed to a chair (including a wheelchair)?: 4  Need to walk in hospital room?: 4  Climbing 3-5 steps with a railing?: 3  Basic Mobility Total Score: 22       Treatment & Education:  Ambulation - 105'   Transfer training   Sit to stands x 10 reps  Heel slides - 10 reps     Patient left supine with call button in reach..    GOALS:   Multidisciplinary Problems       Physical Therapy Goals          Problem: Physical Therapy    Goal Priority Disciplines Outcome Goal Variances Interventions   Physical Therapy Goal     PT, PT/OT Ongoing, Progressing     Description: Short Term Goals  Ambulate SBA - 200 feet with rolling walker assistive device.   Supine to sit stand-by  Sit to stand stand-by  SPT stand-by  0-100 knee flexion right le to allow normal sit to stand  5. Independent with HEP    Long Term Goals  Ambulate SBA - 400 feet with rolling walker assistive device.   Supine to sit independent without device  Sit to stand independent without device  SPT independent without device  Needed equipment for home.                              Time Tracking:     PT Received On: 07/05/23  PT Start Time: 1337     PT Stop Time: 1415  PT Total Time (min): 38 min     Billable Minutes: Gait Training 15 and Therapeutic Activity 23    Treatment Type: Treatment  PT/PTA: PTA     Number of PTA visits since last PT visit: 1   DAISY Geller  07/05/2023

## 2023-07-05 NOTE — ASSESSMENT & PLAN NOTE
Lab Results   Component Value Date    HGBA1C 6.2 06/27/2023        Antihyperglycemics (From admission, onward)    Start     Stop Route Frequency Ordered    07/03/23 1030  glipiZIDE tablet 5 mg         -- Oral With breakfast 07/03/23 0926    07/03/23 0952  insulin aspart U-100 injection 1-10 Units         -- SubQ Before meals & nightly PRN 07/03/23 0852        Hold Oral hypoglycemics while patient is in the hospital.    07/05/23 dcd glipizide , on moderate SSI coverage

## 2023-07-05 NOTE — PT/OT/SLP PROGRESS
Physical Therapy Treatment    Patient Name:  Sadie Martinez   MRN:  [id number]    Recommendations:     Discharge Recommendations: home health OT, home health PT  Discharge Equipment Recommendations: to be determined by next level of care  Barriers to discharge: Decreased caregiver support    Assessment:     Sadie Martinez is a 58 y.o. female admitted with a medical diagnosis of S/P total knee replacement, right.  She presents with the following impairments/functional limitations: weakness, impaired endurance, impaired functional mobility, gait instability, impaired balance, decreased coordination, decreased lower extremity function, decreased ROM, impaired muscle length.  Removed right knee immobilizer following communication with primary therapist.  Patient reports slight tenderness surrounding medial, distal hamstring.  She had no complaints of increased pain with exercises or ambulation.  She benefits from verbal cues for heel first with initial contact for right foot and improved right knee flexion with swing.    Rehab Prognosis: Good; patient would benefit from acute skilled PT services to address these deficits and reach maximum level of function.    Recent Surgery: * No surgery found *      Plan:     During this hospitalization, patient to be seen BID to address the identified rehab impairments via gait training, therapeutic activities, therapeutic exercises, neuromuscular re-education and progress toward the following goals:    Plan of Care Expires:  23    Subjective     Chief Complaint: Patient identified via name and .  Patient reports no new complaints.   Patient/Family Comments/goals: improve strength and mobility  Pain/Comfort:  Pain Rating 1: 0/10      Objective:     Communicated with PT prior to session.  Patient found supine with knee immobilizer upon PT entry to room.     General Precautions: Standard, fall  Orthopedic Precautions: RLE weight bearing as tolerated  Braces: Knee  immobilizer  Respiratory Status: Room air     Functional Mobility:  Bed Mobility:     Supine to Sit: stand by assistance  Sit to Supine: stand by assistance  Transfers:     Sit to Stand:  stand by assistance with rolling walker  Gait: Patient performed gait training x 85 feet with RW and CGA with verbal cues needed for step through, heel first and improved right knee flex with swing.      AM-PAC 6 CLICK MOBILITY  Turning over in bed (including adjusting bedclothes, sheets and blankets)?: 3  Sitting down on and standing up from a chair with arms (e.g., wheelchair, bedside commode, etc.): 3  Moving from lying on back to sitting on the side of the bed?: 3  Moving to and from a bed to a chair (including a wheelchair)?: 3  Need to walk in hospital room?: 3  Climbing 3-5 steps with a railing?: 3  Basic Mobility Total Score: 18       Treatment & Education:  Ankle pumps x 20 reps  Heel slides 5 second hold x 10 reps  SLR with assist x 10 reps   QS with 5 second hold x 10 reps     Patient left supine with all lines intact and call button in reach..    GOALS:   Multidisciplinary Problems       Physical Therapy Goals          Problem: Physical Therapy    Goal Priority Disciplines Outcome Goal Variances Interventions   Physical Therapy Goal     PT, PT/OT Ongoing, Progressing     Description: Short Term Goals  Ambulate SBA - 200 feet with rolling walker assistive device.   Supine to sit stand-by  Sit to stand stand-by  SPT stand-by  0-100 knee flexion right le to allow normal sit to stand  5. Independent with HEP    Long Term Goals  Ambulate SBA - 400 feet with rolling walker assistive device.   Supine to sit independent without device  Sit to stand independent without device  SPT independent without device  Needed equipment for home.                              Time Tracking:     PT Received On: 07/05/23  PT Start Time: 1028     PT Stop Time: 1058  PT Total Time (min): 30 min     Billable Minutes: Gait Training 12 minutes and  Therapeutic Exercise 18 minutes    Treatment Type: Treatment  PT/PTA: PT     Number of PTA visits since last PT visit: 0     07/05/2023

## 2023-07-05 NOTE — SUBJECTIVE & OBJECTIVE
Interval History: 07/05/23 Admitted to swing bed on 06/30/23 s/p right TKR. She has ambulated 140 feet with rolling walker. Potassium is 3.2 this morning. Will give replacement po.    Review of Systems   Respiratory:  Negative for chest tightness and shortness of breath.    Cardiovascular:  Negative for chest pain.   Gastrointestinal:  Negative for constipation, diarrhea, nausea and vomiting.   Genitourinary:  Negative for difficulty urinating.   Neurological:  Positive for weakness.   Objective:     Vital Signs (Most Recent):  Temp: 97.6 °F (36.4 °C) (07/05/23 0724)  Pulse: 86 (07/05/23 0724)  Resp: 20 (07/05/23 0724)  BP: 120/66 (07/05/23 0724)  SpO2: 99 % (07/05/23 0724) Vital Signs (24h Range):  Temp:  [97.6 °F (36.4 °C)-98.2 °F (36.8 °C)] 97.6 °F (36.4 °C)  Pulse:  [80-87] 86  Resp:  [16-20] 20  SpO2:  [98 %-100 %] 99 %  BP: (113-125)/(56-72) 120/66     Weight: 74 kg (163 lb 3.2 oz)  Body mass index is 29.85 kg/m².    Intake/Output Summary (Last 24 hours) at 7/5/2023 0956  Last data filed at 7/5/2023 0850  Gross per 24 hour   Intake 880 ml   Output --   Net 880 ml         Physical Exam  HENT:      Head: Normocephalic.      Mouth/Throat:      Mouth: Mucous membranes are moist.   Cardiovascular:      Rate and Rhythm: Normal rate and regular rhythm.      Pulses: Normal pulses.   Pulmonary:      Effort: Pulmonary effort is normal.      Breath sounds: Normal breath sounds.   Abdominal:      General: Bowel sounds are normal.      Palpations: Abdomen is soft.   Skin:     General: Skin is warm and dry.      Comments: Dressing intact to right knee   Neurological:      Mental Status: She is alert and oriented to person, place, and time.   Psychiatric:         Mood and Affect: Mood normal.           Significant Labs: All pertinent labs within the past 24 hours have been reviewed.  Recent Lab Results         07/05/23  0552   07/04/23  2052   07/04/23  1639   07/04/23  1131        POC Glucose 161   129   153   222                Significant Imaging: I have reviewed all pertinent imaging results/findings within the past 24 hours.

## 2023-07-05 NOTE — PROGRESS NOTES
[facility]  Hospital Medicine  Progress Note    Patient Name: Sadie Martinez  MRN: [id number]  Patient Class: IP- Swing   Admission Date: 6/30/2023  Length of Stay: 5 days  Attending Physician: Mani Higginbotham IV, DO  Primary Care Provider: Indra Matos MD        Subjective:     Principal Problem:S/P total knee replacement, right    [facility]      ______________________________________________________________________        HPI:  Chief complaint:  Right knee pain.     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  Patient states she injured her right knee at work in 2017.  States has had discomfort with it since.  More than a year ago she had a meniscus tear in right knee requiring a knee scope.  Has had continued pain leading to her knee surgery.  Denies any significant pain to her left knee.      Review of system:  See above.  Wears glasses without recent change in vision.  States also has some chronic back issues which she states initiated at her work.  Denies any snoring and states sleep issues seemed to come from her back and chronic knee pain.  Review of systems otherwise.     Past medical history:  -hypertension greater than 15 years.  -diabetes mellitus type 2 treated with oral agents  -diagnosed 2020 with breast cancer status post left mastectomy.  No chemotherapy or radiation needed.  Followed by Dr. Warner.     Past surgical history:  Right knee scope  Right total knee replacement this admission  Previous left mastectomy in 2020  Hysterectomy with bilateral salpingo-oophorectomy  Low back surgery     Patient list Mobic as an allergy     Social history:  Lives by herself  Has children that live in the area that could possibly help her as needed  Would like to go to swing bed following this admission  No history of tobacco use  Drink alcohol only  occasionally     Family history:  Mother had heart problems with heart attack starting her 60s     Health maintenance issues:  Primary care provider is Melanie Watts NP  Had flu shot last fall  No prior Pneumovax but recommended she get wanted age 63  Had mammogram in April  Last Pap smear 3 years earlier  Scheduled for colonoscopy next year, states prior colonoscopy showed an area they were concerned about infection and they found no polyps but told her to repeat it next year              Overview/Hospital Course:  06/26 Not much pain. Ortho note seen with need to return to surgery with abnormal position prosthesis. Discussed with Dr Warner and will stop her Arimidex for two weeks to lower risk of DVT. Discussed with pt and she understands. BP and BS Ok. Correct electrolytes.   06/27 Seen prior to surgery in good spirits. Seems medically stable to go. Plan rehab at [place]. BP and BS good.      Overview/Hospital Course:  07/05/23 Admitted to swing bed on 06/30/23 s/p right TKR. She has ambulated 140 feet with rolling walker. Potassium is 3.2 this morning. Will give replacement po.      Interval History: 07/05/23 Admitted to swing bed on 06/30/23 s/p right TKR. She has ambulated 140 feet with rolling walker. Potassium is 3.2 this morning. Will give replacement po.    Review of Systems   Respiratory:  Negative for chest tightness and shortness of breath.    Cardiovascular:  Negative for chest pain.   Gastrointestinal:  Negative for constipation, diarrhea, nausea and vomiting.   Genitourinary:  Negative for difficulty urinating.   Neurological:  Positive for weakness.   Objective:     Vital Signs (Most Recent):  Temp: 97.6 °F (36.4 °C) (07/05/23 0724)  Pulse: 86 (07/05/23 0724)  Resp: 20 (07/05/23 0724)  BP: 120/66 (07/05/23 0724)  SpO2: 99 % (07/05/23 0724) Vital Signs (24h Range):  Temp:  [97.6 °F (36.4 °C)-98.2 °F (36.8 °C)] 97.6 °F (36.4 °C)  Pulse:  [80-87] 86  Resp:  [16-20] 20  SpO2:  [98 %-100 %] 99 %  BP:  (113-125)/(56-72) 120/66     Weight: 74 kg (163 lb 3.2 oz)  Body mass index is 29.85 kg/m².    Intake/Output Summary (Last 24 hours) at 7/5/2023 0956  Last data filed at 7/5/2023 0850  Gross per 24 hour   Intake 880 ml   Output --   Net 880 ml         Physical Exam  HENT:      Head: Normocephalic.      Mouth/Throat:      Mouth: Mucous membranes are moist.   Cardiovascular:      Rate and Rhythm: Normal rate and regular rhythm.      Pulses: Normal pulses.   Pulmonary:      Effort: Pulmonary effort is normal.      Breath sounds: Normal breath sounds.   Abdominal:      General: Bowel sounds are normal.      Palpations: Abdomen is soft.   Skin:     General: Skin is warm and dry.      Comments: Dressing intact to right knee   Neurological:      Mental Status: She is alert and oriented to person, place, and time.   Psychiatric:         Mood and Affect: Mood normal.           Significant Labs: All pertinent labs within the past 24 hours have been reviewed.  Recent Lab Results         07/05/23  0552   07/04/23  2052   07/04/23  1639   07/04/23  1131        POC Glucose 161   129   153   222               Significant Imaging: I have reviewed all pertinent imaging results/findings within the past 24 hours.      Assessment/Plan:      * S/P total knee replacement, right  PT/OT eval and treat      Generalized weakness    07/05/23 Continue therapy for strengthening    Type 2 diabetes mellitus without complication, without long-term current use of insulin    Lab Results   Component Value Date    HGBA1C 6.2 06/27/2023        Antihyperglycemics (From admission, onward)    Start     Stop Route Frequency Ordered    07/03/23 1030  glipiZIDE tablet 5 mg         -- Oral With breakfast 07/03/23 0926    07/03/23 0952  insulin aspart U-100 injection 1-10 Units         -- SubQ Before meals & nightly PRN 07/03/23 0852        Hold Oral hypoglycemics while patient is in the hospital.    07/05/23 dcd glipizide , on moderate SSI  coverage    Systolic hypertension  116/70 monitor and titrate PRN        VTE Risk Mitigation (From admission, onward)         Ordered     enoxaparin injection 60 mg  Daily         06/30/23 2119     Place SONI hose  Until discontinued         06/30/23 2119     IP VTE HIGH RISK PATIENT  Once         06/30/23 2119                Discharge Planning   GERRI: 8/3/2023     Code Status: Full Code   Is the patient medically ready for discharge?:     Reason for patient still in hospital (select all that apply): Treatment  Discharge Plan A: Home, Home Health                  WENDY Pabon  Department of Hospital Medicine   [facility]

## 2023-07-05 NOTE — PLAN OF CARE
[facility] - Swing Bed   Interdisciplinary Team Meeting    Patient: Sadie Martinez   Today's Date: 7/5/2023   Estimated D/C Date: 8/3/2023        Physician: Mani Higginbotham MD Nurse Practitioner: Belinda Carvajal NP   Pharmacy: Harish AmorD Unit Director: JULIAN Petersen   :  Sharita Sin RN Physical/Occupational Therapy:  Petey Mistry PT   Speech Therapy: NA Activity Therapy: Radha Carver LPN   Nursing: JULIAN Petersen  Respiratory: Mitchell Coffman, RT Dietary: Castro Melchor RD  Other:      Nursing  New Symptoms/Problems: N/A  Last Bowel Movement: 07/04/23  Urine: continent Diarrhea: No   Constipated: No Bowel: continent Gonzalez: No   Isolation: No     Device (Oxygen Therapy): room air    SpO2: 99 %    Diet/Nutrition Received: consistent carb/diabetic diet  Speech/Swallowing: No current speech or swallowing issues  Aspiration Precautions: No  Cognition: WNL      Physical Therapy  Physical Therapy/Gait: 85' W/RW w/CGA ELOS: Plan to DC 8/3/2023    Transfers: Standby Assistance Range of Motion/Restrictions: WBAT RLE     Occupational Therapy  Eating/Grooming: Independent Toileting: Contact Guard Assistance   Bathing: Minimal Assistance Dressing (Upper Body): Independent   Dressing (Lower Body): Contact Guard Assistance      Activity Therapy  Level of participation: Active participation      Tx Plan/Recommendations reviewed with family and/or patient on (date) 07/03/2023.  Additional family Conference/Training: N/A  D/C Plan/Recommendations: Home with HH  GERRI: 8/3/2023    Pharmacy  Medication Changes (see MD orders in chart): Yes  MD/NP: Mani Higginbotham MD Labs Reviewed: Yes New Lab Orders: Yes   Lab Concerns: Q M/Th

## 2023-07-06 LAB
GLUCOSE SERPL-MCNC: 106 MG/DL (ref 70–105)
GLUCOSE SERPL-MCNC: 186 MG/DL (ref 70–105)
GLUCOSE SERPL-MCNC: 212 MG/DL (ref 70–105)
GLUCOSE SERPL-MCNC: 232 MG/DL (ref 70–105)

## 2023-07-06 PROCEDURE — 27000982 HC MATTRESS, MATRIX LAL RENTAL

## 2023-07-06 PROCEDURE — 82962 GLUCOSE BLOOD TEST: CPT

## 2023-07-06 PROCEDURE — 27000944

## 2023-07-06 PROCEDURE — 25000003 PHARM REV CODE 250: Performed by: NURSE PRACTITIONER

## 2023-07-06 PROCEDURE — 99308 PR NURSING FAC CARE, SUBSEQ, MINOR COMPLIC: ICD-10-PCS | Mod: ,,, | Performed by: FAMILY MEDICINE

## 2023-07-06 PROCEDURE — 63600175 PHARM REV CODE 636 W HCPCS: Performed by: FAMILY MEDICINE

## 2023-07-06 PROCEDURE — 11000004 HC SNF PRIVATE

## 2023-07-06 PROCEDURE — 99308 SBSQ NF CARE LOW MDM 20: CPT | Mod: ,,, | Performed by: FAMILY MEDICINE

## 2023-07-06 PROCEDURE — 97530 THERAPEUTIC ACTIVITIES: CPT

## 2023-07-06 PROCEDURE — 97110 THERAPEUTIC EXERCISES: CPT | Mod: CQ

## 2023-07-06 PROCEDURE — 63600175 PHARM REV CODE 636 W HCPCS: Performed by: NURSE PRACTITIONER

## 2023-07-06 PROCEDURE — 97535 SELF CARE MNGMENT TRAINING: CPT

## 2023-07-06 PROCEDURE — 97116 GAIT TRAINING THERAPY: CPT | Mod: CQ

## 2023-07-06 RX ORDER — BISACODYL 5 MG
10 TABLET, DELAYED RELEASE (ENTERIC COATED) ORAL DAILY PRN
Status: DISCONTINUED | OUTPATIENT
Start: 2023-07-06 | End: 2023-07-12 | Stop reason: HOSPADM

## 2023-07-06 RX ADMIN — HYDROCODONE BITARTRATE AND ACETAMINOPHEN 1 TABLET: 7.5; 325 TABLET ORAL at 08:07

## 2023-07-06 RX ADMIN — ASPIRIN 81 MG: 81 TABLET, COATED ORAL at 08:07

## 2023-07-06 RX ADMIN — ATORVASTATIN CALCIUM 40 MG: 40 TABLET, FILM COATED ORAL at 09:07

## 2023-07-06 RX ADMIN — INSULIN ASPART 4 UNITS: 100 INJECTION, SOLUTION INTRAVENOUS; SUBCUTANEOUS at 11:07

## 2023-07-06 RX ADMIN — HYDROCODONE BITARTRATE AND ACETAMINOPHEN 1 TABLET: 7.5; 325 TABLET ORAL at 05:07

## 2023-07-06 RX ADMIN — HYDROCODONE BITARTRATE AND ACETAMINOPHEN 1 TABLET: 7.5; 325 TABLET ORAL at 11:07

## 2023-07-06 RX ADMIN — AMLODIPINE BESYLATE 5 MG: 5 TABLET ORAL at 08:07

## 2023-07-06 RX ADMIN — SENNOSIDES 8.6 MG: 8.6 TABLET, FILM COATED ORAL at 08:07

## 2023-07-06 RX ADMIN — ACETAMINOPHEN 650 MG: 325 TABLET ORAL at 09:07

## 2023-07-06 RX ADMIN — PANTOPRAZOLE SODIUM 40 MG: 40 TABLET, DELAYED RELEASE ORAL at 08:07

## 2023-07-06 RX ADMIN — ENOXAPARIN SODIUM 60 MG: 60 INJECTION SUBCUTANEOUS at 05:07

## 2023-07-06 RX ADMIN — ACETAMINOPHEN 650 MG: 325 TABLET ORAL at 12:07

## 2023-07-06 RX ADMIN — ACETAMINOPHEN 650 MG: 325 TABLET ORAL at 01:07

## 2023-07-06 RX ADMIN — HYDROCHLOROTHIAZIDE 25 MG: 12.5 TABLET ORAL at 08:07

## 2023-07-06 NOTE — PT/OT/SLP PROGRESS
Occupational Therapy   Treatment    Name: Sadie Martinez  MRN: [id number]  Admitting Diagnosis:  S/P total knee replacement, right       Recommendations:     Discharge Recommendations: home health PT, home health OT  Discharge Equipment Recommendations:  to be determined by next level of care  Barriers to discharge:  Inaccessible home environment    Assessment:     Sadie Martinez is a 58 y.o. female with a medical diagnosis of S/P total knee replacement, right.  She presents with the following performance deficits affecting function are weakness, impaired endurance, impaired self care skills, impaired functional mobility, gait instability, impaired balance.     Rehab Prognosis:  Good; patient would benefit from acute skilled OT services to address these deficits and reach maximum level of function.       Plan:     Patient to be seen 5 x/week to address the above listed problems via self-care/home management, therapeutic activities, therapeutic exercises, neuromuscular re-education  Plan of Care Expires:    Plan of Care Reviewed with: patient    Subjective     Chief Complaint: R knee pain, impaired coordination  Patient/Family Comments/goals: Agreeable to OT tx  Pain/Comfort:       Objective:     Communicated with: Nurse prior to session.  Patient found up in chair upon OT entry to room.    General Precautions: Standard, fall    Orthopedic Precautions:RLE weight bearing as tolerated  Braces:   Respiratory Status: Room air     Occupational Performance:     Bed Mobility:        Functional Mobility/Transfers:  Patient completed Sit <> Stand Transfer with stand by assistance  with  rolling walker       Activities of Daily Living:  Lower Body Dressing: stand by assistance with use of sock aide and reacher to don/doff B socks      [facility] 6 Click ADL:      Treatment & Education:  Patient performed dynamic standing balance activity including crossing midline and weight shifting with SBA and one hand support from rolling  walker    Patient left  up in chair in therapy gym  with  PTA present    GOALS:   Multidisciplinary Problems       Occupational Therapy Goals          Problem: Occupational Therapy    Goal Priority Disciplines Outcome Interventions   Occupational Therapy Goal     OT, PT/OT Ongoing, Progressing    Description: ST week    Pt will bathe self with SBA  Pt will perform LB dressing with SBA\  Pt will transfer toilet/BSC with mod I  Pt will perform standing task x 8 min with standby assistance      LT weeks  1. Pt will achieve max level of independence with self care.                         Time Tracking:     OT Date of Treatment: 23  OT Start Time: 1003  OT Stop Time: 1026  OT Total Time (min): 23 min    Billable Minutes:Self Care/Home Management 10  Therapeutic Activity 13      2023

## 2023-07-06 NOTE — SUBJECTIVE & OBJECTIVE
Interval History: weakness    Review of Systems   Constitutional:  Negative for activity change, appetite change, fatigue and fever.   HENT:  Negative for congestion, ear pain, sinus pain and sneezing.    Eyes:  Negative for pain.   Respiratory:  Negative for cough, shortness of breath and wheezing.    Cardiovascular:  Negative for chest pain and palpitations.   Gastrointestinal:  Negative for abdominal pain, constipation, diarrhea and nausea.   Endocrine: Negative for cold intolerance and heat intolerance.   Genitourinary:  Negative for dysuria and vaginal discharge.   Musculoskeletal:  Negative for back pain.        Ace dressing to right lower leg   Skin:  Negative for color change and rash.   Allergic/Immunologic: Negative for environmental allergies, food allergies and immunocompromised state.   Neurological:  Negative for dizziness and headaches.   Hematological:  Does not bruise/bleed easily.   Psychiatric/Behavioral:  Negative for sleep disturbance and suicidal ideas.    Objective:     Vital Signs (Most Recent):  Temp: 97.5 °F (36.4 °C) (07/06/23 0721)  Pulse: 80 (07/06/23 0721)  Resp: 20 (07/06/23 0828)  BP: 123/64 (07/06/23 0721)  SpO2: 100 % (07/06/23 0721) Vital Signs (24h Range):  Temp:  [97.5 °F (36.4 °C)-98.2 °F (36.8 °C)] 97.5 °F (36.4 °C)  Pulse:  [] 80  Resp:  [18-20] 20  SpO2:  [98 %-100 %] 100 %  BP: (119-143)/(64-70) 123/64     Weight: 74 kg (163 lb 3.2 oz)  Body mass index is 29.85 kg/m².    Intake/Output Summary (Last 24 hours) at 7/6/2023 1111  Last data filed at 7/6/2023 0830  Gross per 24 hour   Intake 900 ml   Output --   Net 900 ml         Physical Exam  Constitutional:       Appearance: Normal appearance. She is normal weight.   HENT:      Head: Normocephalic.      Nose: Nose normal.      Mouth/Throat:      Pharynx: Oropharynx is clear.   Eyes:      Extraocular Movements: Extraocular movements intact.   Cardiovascular:      Rate and Rhythm: Normal rate and regular rhythm.       Pulses: Normal pulses.      Heart sounds: Normal heart sounds.   Pulmonary:      Effort: Pulmonary effort is normal.      Breath sounds: Normal breath sounds.   Abdominal:      General: Bowel sounds are normal.   Musculoskeletal:      Cervical back: Normal range of motion and neck supple.   Skin:     General: Skin is warm and dry.      Comments: Ace dressing to right leg      Neurological:      General: No focal deficit present.      Mental Status: She is alert and oriented to person, place, and time.   Psychiatric:         Mood and Affect: Mood normal.           Significant Labs: All pertinent labs within the past 24 hours have been reviewed.  Recent Lab Results         07/06/23  0641   07/05/23  2006   07/05/23  1611   07/05/23  1131        POC Glucose 186   295   154   173               Significant Imaging: I have reviewed all pertinent imaging results/findings within the past 24 hours.

## 2023-07-06 NOTE — PLAN OF CARE
Problem: Adult Inpatient Plan of Care  Goal: Patient-Specific Goal (Individualized)  Outcome: Ongoing, Progressing     Problem: Diabetes Comorbidity  Goal: Blood Glucose Level Within Targeted Range  Outcome: Ongoing, Progressing  Intervention: Monitor and Manage Glycemia  Flowsheets (Taken 7/6/2023 0046)  Glycemic Management: blood glucose monitored     Problem: Pain Acute  Goal: Acceptable Pain Control and Functional Ability  Outcome: Ongoing, Progressing  Intervention: Prevent or Manage Pain  Flowsheets (Taken 7/6/2023 0046)  Sleep/Rest Enhancement:   regular sleep/rest pattern promoted   noise level reduced   room darkened  Bowel Elimination Promotion:   adequate fluid intake promoted   ambulation promoted  Medication Review/Management: medications reviewed

## 2023-07-06 NOTE — PROGRESS NOTES
[facility]  Hospital Medicine  Progress Note    Patient Name: Sadie Martinez  MRN: [id number]  Patient Class: IP- Swing   Admission Date: 6/30/2023  Length of Stay: 6 days  Attending Physician: Mani Higginbotham IV, DO  Primary Care Provider: Indra Matos MD        Subjective:     Principal Problem:S/P total knee replacement, right        HPI:  Chief complaint:  Right knee pain.     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  Patient states she injured her right knee at work in 2017.  States has had discomfort with it since.  More than a year ago she had a meniscus tear in right knee requiring a knee scope.  Has had continued pain leading to her knee surgery.  Denies any significant pain to her left knee.      Review of system:  See above.  Wears glasses without recent change in vision.  States also has some chronic back issues which she states initiated at her work.  Denies any snoring and states sleep issues seemed to come from her back and chronic knee pain.  Review of systems otherwise.     Past medical history:  -hypertension greater than 15 years.  -diabetes mellitus type 2 treated with oral agents  -diagnosed 2020 with breast cancer status post left mastectomy.  No chemotherapy or radiation needed.  Followed by Dr. Warner.     Past surgical history:  Right knee scope  Right total knee replacement this admission  Previous left mastectomy in 2020  Hysterectomy with bilateral salpingo-oophorectomy  Low back surgery     Patient list Mobic as an allergy     Social history:  Lives by herself  Has children that live in the area that could possibly help her as needed  Would like to go to swing bed following this admission  No history of tobacco use  Drink alcohol only occasionally     Family history:  Mother had heart problems with heart attack starting her 60s     Health maintenance issues:  Primary  care provider is Melanie Watts NP  Had flu shot last fall  No prior Pneumovax but recommended she get wanted age 63  Had mammogram in April  Last Pap smear 3 years earlier  Scheduled for colonoscopy next year, states prior colonoscopy showed an area they were concerned about infection and they found no polyps but told her to repeat it next year              Overview/Hospital Course:  06/26 Not much pain. Ortho note seen with need to return to surgery with abnormal position prosthesis. Discussed with Dr Warner and will stop her Arimidex for two weeks to lower risk of DVT. Discussed with pt and she understands. BP and BS Ok. Correct electrolytes.   06/27 Seen prior to surgery in good spirits. Seems medically stable to go. Plan rehab at [place]. BP and BS good.      Overview/Hospital Course:  07/05/23 Admitted to swing bed on 06/30/23 s/p right TKR. She has ambulated 140 feet with rolling walker. Potassium is 3.2 this morning. Will give replacement po.    07/06/23 Awake and resting in bed . Ace wrap to right lower leg. Working with therapy. Ambulated 105 feet with rolling walker. States she stills feels unsteady. She lives alone and will be returning to her home alone. Continue therapy. Requests dulcolax.      Interval History: weakness    Review of Systems   Constitutional:  Negative for activity change, appetite change, fatigue and fever.   HENT:  Negative for congestion, ear pain, sinus pain and sneezing.    Eyes:  Negative for pain.   Respiratory:  Negative for cough, shortness of breath and wheezing.    Cardiovascular:  Negative for chest pain and palpitations.   Gastrointestinal:  Negative for abdominal pain, constipation, diarrhea and nausea.   Endocrine: Negative for cold intolerance and heat intolerance.   Genitourinary:  Negative for dysuria and vaginal discharge.   Musculoskeletal:  Negative for back pain.        Ace dressing to right lower leg   Skin:  Negative for color change and rash.   Allergic/Immunologic:  Negative for environmental allergies, food allergies and immunocompromised state.   Neurological:  Negative for dizziness and headaches.   Hematological:  Does not bruise/bleed easily.   Psychiatric/Behavioral:  Negative for sleep disturbance and suicidal ideas.    Objective:     Vital Signs (Most Recent):  Temp: 97.5 °F (36.4 °C) (07/06/23 0721)  Pulse: 80 (07/06/23 0721)  Resp: 20 (07/06/23 0828)  BP: 123/64 (07/06/23 0721)  SpO2: 100 % (07/06/23 0721) Vital Signs (24h Range):  Temp:  [97.5 °F (36.4 °C)-98.2 °F (36.8 °C)] 97.5 °F (36.4 °C)  Pulse:  [] 80  Resp:  [18-20] 20  SpO2:  [98 %-100 %] 100 %  BP: (119-143)/(64-70) 123/64     Weight: 74 kg (163 lb 3.2 oz)  Body mass index is 29.85 kg/m².    Intake/Output Summary (Last 24 hours) at 7/6/2023 1111  Last data filed at 7/6/2023 0830  Gross per 24 hour   Intake 900 ml   Output --   Net 900 ml         Physical Exam  Constitutional:       Appearance: Normal appearance. She is normal weight.   HENT:      Head: Normocephalic.      Nose: Nose normal.      Mouth/Throat:      Pharynx: Oropharynx is clear.   Eyes:      Extraocular Movements: Extraocular movements intact.   Cardiovascular:      Rate and Rhythm: Normal rate and regular rhythm.      Pulses: Normal pulses.      Heart sounds: Normal heart sounds.   Pulmonary:      Effort: Pulmonary effort is normal.      Breath sounds: Normal breath sounds.   Abdominal:      General: Bowel sounds are normal.   Musculoskeletal:      Cervical back: Normal range of motion and neck supple.   Skin:     General: Skin is warm and dry.      Comments: Ace dressing to right leg      Neurological:      General: No focal deficit present.      Mental Status: She is alert and oriented to person, place, and time.   Psychiatric:         Mood and Affect: Mood normal.           Significant Labs: All pertinent labs within the past 24 hours have been reviewed.  Recent Lab Results         07/06/23  0641   07/05/23 2006 07/05/23  1611    07/05/23  1131        POC Glucose 186   295   154   173               Significant Imaging: I have reviewed all pertinent imaging results/findings within the past 24 hours.      Assessment/Plan:      * S/P total knee replacement, right  PT/OT eval and treat        07/06/23 walked 105 feet with therapy yesterday, states she still feels unsteady    Generalized weakness    07/05/23 Continue therapy for strengthening    07/06/23 continue therapy     Type 2 diabetes mellitus without complication, without long-term current use of insulin    Lab Results   Component Value Date    HGBA1C 6.2 06/27/2023        Antihyperglycemics (From admission, onward)      Start     Stop Route Frequency Ordered    07/03/23 0952  insulin aspart U-100 injection 1-10 Units         -- SubQ Before meals & nightly PRN 07/03/23 0852          Hold Oral hypoglycemics while patient is in the hospital.    07/05/23 dcd glipizide , on moderate SSI coverage    07/06/23 continue to monitor on SSI coverage     Systolic hypertension  116/70 monitor and titrate PRN        VTE Risk Mitigation (From admission, onward)           Ordered     enoxaparin injection 60 mg  Daily         06/30/23 2119     Place SONI hose  Until discontinued         06/30/23 2119     IP VTE HIGH RISK PATIENT  Once         06/30/23 2119                    Discharge Planning   GERRI: 8/3/2023     Code Status: Full Code   Is the patient medically ready for discharge?:     Reason for patient still in hospital (select all that apply): Treatment  Discharge Plan A: Home, Home Health                  Mani Higginbotham IV, DO  Department of Hospital Medicine   [facility]

## 2023-07-06 NOTE — PLAN OF CARE
Problem: Adult Inpatient Plan of Care  Goal: Plan of Care Review  Outcome: Ongoing, Progressing  Goal: Patient-Specific Goal (Individualized)  Outcome: Ongoing, Progressing  Goal: Absence of Hospital-Acquired Illness or Injury  Outcome: Ongoing, Progressing  Goal: Optimal Comfort and Wellbeing  Outcome: Ongoing, Progressing  Goal: Readiness for Transition of Care  Outcome: Ongoing, Progressing  Intervention: Mutually Develop Transition Plan  Flowsheets (Taken 7/6/2023 8933)  Transportation Anticipated: family or friend will provide     Problem: Diabetes Comorbidity  Goal: Blood Glucose Level Within Targeted Range  Outcome: Ongoing, Progressing     Problem: Pain Acute  Goal: Acceptable Pain Control and Functional Ability  Outcome: Ongoing, Progressing

## 2023-07-06 NOTE — PT/OT/SLP PROGRESS
Physical Therapy Treatment    Patient Name:  Sadie Martinez   MRN:  [id number]    Recommendations:     Discharge Recommendations: home health PT, home health OT  Discharge Equipment Recommendations: to be determined by next level of care  Barriers to discharge: Decreased caregiver support    Assessment:     Sadie Martinez is a 58 y.o. female admitted with a medical diagnosis of S/P total knee replacement, right.  She presents with the following impairments/functional limitations: weakness, impaired endurance, impaired functional mobility, decreased coordination, decreased lower extremity function, pain, decreased ROM Patient was agreeable to work with therapy today.     Rehab Prognosis: Good; patient would benefit from acute skilled PT services to address these deficits and reach maximum level of function.    Recent Surgery: * No surgery found *      Plan:     During this hospitalization, patient to be seen BID (5 x/week) to address the identified rehab impairments via gait training, therapeutic activities, therapeutic exercises, neuromuscular re-education and progress toward the following goals:    Plan of Care Expires:  08/03/23    Subjective     Chief Complaint: weakness  Patient/Family Comments/goals: return home  Pain/Comfort:  Pain Rating 1: 7/10  Location - Side 1: Right  Location 1: knee  Pain Rating Post-Intervention 1: 7/10      Objective:     Communicated with PT prior to session.  Patient found up in chair with   upon PT entry to room.     General Precautions: Standard, fall  Orthopedic Precautions: RLE weight bearing as tolerated  Braces: Knee immobilizer  Respiratory Status: Room air     Functional Mobility:  Transfers:     Sit to Stand:  modified independence with rolling walker  Gait: Patient ambulated 100' with RW and supervision with no LOB      AM-PAC 6 CLICK MOBILITY  Turning over in bed (including adjusting bedclothes, sheets and blankets)?: 4  Sitting down on and standing up from a chair with  arms (e.g., wheelchair, bedside commode, etc.): 4  Moving from lying on back to sitting on the side of the bed?: 3  Moving to and from a bed to a chair (including a wheelchair)?: 4  Need to walk in hospital room?: 4  Climbing 3-5 steps with a railing?: 3  Basic Mobility Total Score: 22       Treatment & Education:  Ambulation - 100'   Nu step - 6 minutes   Heel slides - 10 reps   Quad sets - 20 reps   Short arc quads - 20 reps     Patient left up in chair with call button in reach..    GOALS:   Multidisciplinary Problems       Physical Therapy Goals          Problem: Physical Therapy    Goal Priority Disciplines Outcome Goal Variances Interventions   Physical Therapy Goal     PT, PT/OT Ongoing, Progressing     Description: Short Term Goals  Ambulate SBA - 200 feet with rolling walker assistive device.   Supine to sit stand-by  Sit to stand stand-by  SPT stand-by  0-100 knee flexion right le to allow normal sit to stand  5. Independent with HEP    Long Term Goals  Ambulate SBA - 400 feet with rolling walker assistive device.   Supine to sit independent without device  Sit to stand independent without device  SPT independent without device  Needed equipment for home.                              Time Tracking:     PT Received On: 07/06/23  PT Start Time: 1435     PT Stop Time: 1525  PT Total Time (min): 50 min     Billable Minutes: Gait Training 15 and Therapeutic Exercise 35    Treatment Type: Treatment  PT/PTA: PTA     Number of PTA visits since last PT visit: 3   DAISY Geller  07/06/2023

## 2023-07-06 NOTE — ASSESSMENT & PLAN NOTE
Lab Results   Component Value Date    HGBA1C 6.2 06/27/2023        Antihyperglycemics (From admission, onward)    Start     Stop Route Frequency Ordered    07/03/23 0952  insulin aspart U-100 injection 1-10 Units         -- SubQ Before meals & nightly PRN 07/03/23 0852        Hold Oral hypoglycemics while patient is in the hospital.    07/05/23 dcd glipizide , on moderate SSI coverage    07/06/23 continue to monitor on SSI coverage

## 2023-07-06 NOTE — PT/OT/SLP PROGRESS
Physical Therapy Treatment    Patient Name:  Sadie Martinez   MRN:  [id number]    Recommendations:     Discharge Recommendations: home health PT, home health OT  Discharge Equipment Recommendations: to be determined by next level of care  Barriers to discharge: Decreased caregiver support    Assessment:     Sadie Martinez is a 58 y.o. female admitted with a medical diagnosis of S/P total knee replacement, right.  She presents with the following impairments/functional limitations: weakness, impaired endurance, impaired functional mobility, gait instability, impaired balance, decreased coordination, decreased lower extremity function, decreased ROM Patient was agreeable to work with therapy today.     Rehab Prognosis: Good; patient would benefit from acute skilled PT services to address these deficits and reach maximum level of function.    Recent Surgery: * No surgery found *      Plan:     During this hospitalization, patient to be seen BID (5 x/week) to address the identified rehab impairments via gait training, therapeutic activities, therapeutic exercises, neuromuscular re-education and progress toward the following goals:    Plan of Care Expires:  08/03/23    Subjective     Chief Complaint: weakness  Patient/Family Comments/goals: return home  Pain/Comfort:  Pain Rating 1: 6/10  Location - Side 1: Right  Location 1: knee  Pain Rating Post-Intervention 1: 6/10      Objective:     Communicated with PT prior to session.  Patient found  in therapy gym with OT  with   upon PT entry to room.     General Precautions: Standard, fall  Orthopedic Precautions: RLE weight bearing as tolerated  Braces: Knee immobilizer  Respiratory Status: Room air     Functional Mobility:  Transfers:     Sit to Stand:  modified independence with rolling walker  Gait: Patient ambulated 100' with RW and supervision with no LOB      AM-PAC 6 CLICK MOBILITY  Turning over in bed (including adjusting bedclothes, sheets and blankets)?: 4  Sitting  down on and standing up from a chair with arms (e.g., wheelchair, bedside commode, etc.): 4  Moving from lying on back to sitting on the side of the bed?: 3  Moving to and from a bed to a chair (including a wheelchair)?: 4  Need to walk in hospital room?: 4  Climbing 3-5 steps with a railing?: 3  Basic Mobility Total Score: 22       Treatment & Education:  Ambulation - 100'   Nu step - 6 minutes   Chair squats - 10 reps     Patient left up in chair with call button in reach..    GOALS:   Multidisciplinary Problems       Physical Therapy Goals          Problem: Physical Therapy    Goal Priority Disciplines Outcome Goal Variances Interventions   Physical Therapy Goal     PT, PT/OT Ongoing, Progressing     Description: Short Term Goals  Ambulate SBA - 200 feet with rolling walker assistive device.   Supine to sit stand-by  Sit to stand stand-by  SPT stand-by  0-100 knee flexion right le to allow normal sit to stand  5. Independent with HEP    Long Term Goals  Ambulate SBA - 400 feet with rolling walker assistive device.   Supine to sit independent without device  Sit to stand independent without device  SPT independent without device  Needed equipment for home.                              Time Tracking:     PT Received On: 07/06/23  PT Start Time: 1026     PT Stop Time: 1053  PT Total Time (min): 27 min     Billable Minutes: Gait Training 12 and Therapeutic Activity 15    Treatment Type: Treatment  PT/PTA: PTA     Number of PTA visits since last PT visit: 2   DAISY Geller  07/06/2023

## 2023-07-06 NOTE — ASSESSMENT & PLAN NOTE
PT/OT eval and treat        07/06/23 walked 105 feet with therapy yesterday, states she still feels unsteady

## 2023-07-07 LAB
GLUCOSE SERPL-MCNC: 194 MG/DL (ref 70–105)
GLUCOSE SERPL-MCNC: 196 MG/DL (ref 70–105)
GLUCOSE SERPL-MCNC: 210 MG/DL (ref 70–105)
GLUCOSE SERPL-MCNC: 246 MG/DL (ref 70–105)

## 2023-07-07 PROCEDURE — 25000003 PHARM REV CODE 250: Performed by: NURSE PRACTITIONER

## 2023-07-07 PROCEDURE — 82962 GLUCOSE BLOOD TEST: CPT

## 2023-07-07 PROCEDURE — 63600175 PHARM REV CODE 636 W HCPCS: Performed by: NURSE PRACTITIONER

## 2023-07-07 PROCEDURE — 97110 THERAPEUTIC EXERCISES: CPT

## 2023-07-07 PROCEDURE — 27000982 HC MATTRESS, MATRIX LAL RENTAL

## 2023-07-07 PROCEDURE — 97110 THERAPEUTIC EXERCISES: CPT | Mod: CQ

## 2023-07-07 PROCEDURE — 97530 THERAPEUTIC ACTIVITIES: CPT

## 2023-07-07 PROCEDURE — 11000004 HC SNF PRIVATE

## 2023-07-07 PROCEDURE — 97116 GAIT TRAINING THERAPY: CPT | Mod: CQ

## 2023-07-07 PROCEDURE — 27000944

## 2023-07-07 RX ADMIN — PANTOPRAZOLE SODIUM 40 MG: 40 TABLET, DELAYED RELEASE ORAL at 09:07

## 2023-07-07 RX ADMIN — ENOXAPARIN SODIUM 60 MG: 60 INJECTION SUBCUTANEOUS at 04:07

## 2023-07-07 RX ADMIN — HYDROCODONE BITARTRATE AND ACETAMINOPHEN 1 TABLET: 7.5; 325 TABLET ORAL at 10:07

## 2023-07-07 RX ADMIN — ATORVASTATIN CALCIUM 40 MG: 40 TABLET, FILM COATED ORAL at 08:07

## 2023-07-07 RX ADMIN — HYDROCODONE BITARTRATE AND ACETAMINOPHEN 1 TABLET: 7.5; 325 TABLET ORAL at 09:07

## 2023-07-07 RX ADMIN — HYDROCODONE BITARTRATE AND ACETAMINOPHEN 1 TABLET: 7.5; 325 TABLET ORAL at 03:07

## 2023-07-07 RX ADMIN — ASPIRIN 81 MG: 81 TABLET, COATED ORAL at 09:07

## 2023-07-07 RX ADMIN — HYDROCHLOROTHIAZIDE 25 MG: 12.5 TABLET ORAL at 09:07

## 2023-07-07 RX ADMIN — AMLODIPINE BESYLATE 5 MG: 5 TABLET ORAL at 09:07

## 2023-07-07 RX ADMIN — ACETAMINOPHEN 650 MG: 325 TABLET ORAL at 01:07

## 2023-07-07 RX ADMIN — SENNOSIDES 8.6 MG: 8.6 TABLET, FILM COATED ORAL at 09:07

## 2023-07-07 NOTE — PLAN OF CARE
Problem: Fall Injury Risk  Goal: Absence of Fall and Fall-Related Injury  Outcome: Ongoing, Progressing  Intervention: Promote Injury-Free Environment  Flowsheets (Taken 7/7/2023 7984)  Safety Promotion/Fall Prevention:   assistive device/personal item within reach   in recliner, wheels locked   instructed to call staff for mobility   nonskid shoes/socks when out of bed

## 2023-07-07 NOTE — PLAN OF CARE
[facility] - Medical Surgical Unit  Discharge Assessment    Primary Care Provider: Indra Matos MD     Discharge Assessment (most recent)       BRIEF DISCHARGE ASSESSMENT - 07/07/23 1040          Discharge Planning    Assessment Type Discharge Planning Brief Assessment (P)      Resource/Environmental Concerns none (P)      Discharge Plan A Home;Home Health (P)                    Spoke with Ms. Martinez, she plans on discharging home alone at time of discharge.

## 2023-07-07 NOTE — PT/OT/SLP PROGRESS
Physical Therapy Treatment    Patient Name:  Sadie Martinez   MRN:  [id number]    Recommendations:     Discharge Recommendations: home health PT, home health OT  Discharge Equipment Recommendations: to be determined by next level of care  Barriers to discharge: Decreased caregiver support    Assessment:     Sdaie Martinez is a 58 y.o. female admitted with a medical diagnosis of S/P total knee replacement, right.  She presents with the following impairments/functional limitations: weakness, impaired endurance, impaired functional mobility, decreased coordination, decreased lower extremity function, pain, decreased ROM Patient was agreeable to work with therapy today. Patient did report having 6/10 pain this morning and requested longer ambulation be held until afternoon due to increase in pain and not able to receive her pain medication yet.     Rehab Prognosis: Good; patient would benefit from acute skilled PT services to address these deficits and reach maximum level of function.    Recent Surgery: * No surgery found *      Plan:     During this hospitalization, patient to be seen BID (5 x/week) to address the identified rehab impairments via gait training, therapeutic activities, therapeutic exercises, neuromuscular re-education and progress toward the following goals:    Plan of Care Expires:  08/03/23    Subjective     Chief Complaint: weakness  Patient/Family Comments/goals: return home  Pain/Comfort:  Pain Rating 1: 6/10  Location - Side 1: Right  Location 1: knee  Pain Rating Post-Intervention 1: 6/10      Objective:     Communicated with PT prior to session.  Patient found up in chair with   upon PT entry to room.     General Precautions: Standard, fall  Orthopedic Precautions: RLE weight bearing as tolerated  Braces: Knee immobilizer  Respiratory Status: Room air     Functional Mobility:  Transfers:     Sit to Stand:  modified independence with rolling walker  Gait: Patient ambulated 50' with RW and  supervision with no LOB      AM-PAC 6 CLICK MOBILITY  Turning over in bed (including adjusting bedclothes, sheets and blankets)?: 4  Sitting down on and standing up from a chair with arms (e.g., wheelchair, bedside commode, etc.): 4  Moving from lying on back to sitting on the side of the bed?: 3  Moving to and from a bed to a chair (including a wheelchair)?: 4  Need to walk in hospital room?: 4  Climbing 3-5 steps with a railing?: 3  Basic Mobility Total Score: 22       Treatment & Education:  Nu step - 6 minutes   Heel slides - 10 reps   Quad sets - 20 reps   Short arc quads - 20 reps   Ankle pumps - 20 reps     Patient left up in chair with call button in reach and OT present..    GOALS:   Multidisciplinary Problems       Physical Therapy Goals          Problem: Physical Therapy    Goal Priority Disciplines Outcome Goal Variances Interventions   Physical Therapy Goal     PT, PT/OT Ongoing, Progressing     Description: Short Term Goals  Ambulate SBA - 200 feet with rolling walker assistive device.   Supine to sit stand-by  Sit to stand stand-by  SPT stand-by  0-100 knee flexion right le to allow normal sit to stand  5. Independent with HEP    Long Term Goals  Ambulate SBA - 400 feet with rolling walker assistive device.   Supine to sit independent without device  Sit to stand independent without device  SPT independent without device  Needed equipment for home.                              Time Tracking:     PT Received On: 07/07/23  PT Start Time: 0836     PT Stop Time: 0904  PT Total Time (min): 28 min     Billable Minutes: Therapeutic Exercise 28    Treatment Type: Treatment  PT/PTA: PTA     Number of PTA visits since last PT visit: 4   DAISY Geller  07/07/2023

## 2023-07-07 NOTE — PROGRESS NOTES
"  [facility] - Medical Surgical Unit  Adult Nutrition  Consult Note    SUMMARY     Recommendations    Recommendation/Intervention: 1. F/u for intake support  Goals: Meet EEN >75%  Nutrition Goal Status: new    Assessment and Plan    No new Assessment & Plan notes have been filed under this hospital service since the last note was generated.  Service: Nutrition       Malnutrition Assessment  Malnutrition Context:  (Based on assessment this pt is not malnourished.)                                    Reason for Assessment    Reason For Assessment: consult (swing bed pt)  Diagnosis:  (R TKR)  Relevant Medical History: HTN, DM, breast Ca, HLD  Interdisciplinary Rounds: did not attend  General Information Comments: RDN visited pt this p.m.and she denies any difficulty with intake. RDN reviewed some basic DM diet principles.  Meal intake %. Last BM 7/6.    Nutrition Risk Screen    Nutrition Risk Screen: no indicators present    Nutrition/Diet History    Spiritual, Cultural Beliefs, Rastafari Practices, Values that Affect Care: no  Food Allergies: NKFA  Factors Affecting Nutritional Intake: None identified at this time    Anthropometrics    Temp: 98.6 °F (37 °C)  Height Method: Stated  Height: 5' 2" (157.5 cm)  Height (inches): 62 in  Weight Method: Bed Scale  Weight: 73.9 kg (163 lb)  Weight (lb): 163 lb  Ideal Body Weight (IBW), Female: 110 lb  % Ideal Body Weight, Female (lb): 148.18 %  BMI (Calculated): 29.8  BMI Grade: 25 - 29.9 - overweight       Lab/Procedures/Meds    Pertinent Labs Reviewed: reviewed  Pertinent Labs Comments: Hgb A1C 6.2, Alb 3.0  Pertinent Medications Reviewed: reviewed  Pertinent Medications Comments: Lipitor, HCTZ, Protonix    Physical Findings/Assessment         Estimated/Assessed Needs    Weight Used For Calorie Calculations: 55.8 kg (123 lb)  Energy Calorie Requirements (kcal): 0096-5048  Energy Need Method: Kcal/kg  Protein Requirements: 56-62  Weight Used For Protein " Calculations: 55.8 kg (123 lb)     Estimated Fluid Requirement Method: other (see comments) (7623-1483)  RDA Method (mL): 1394         Nutrition Prescription Ordered    Current Diet Order: DM diet    Evaluation of Received Nutrient/Fluid Intake    Energy Calories Required: meeting needs  Protein Required: meeting needs  Fluid Required: meeting needs  Tolerance: tolerating  % Intake of Estimated Energy Needs: 75 - 100 %  % Meal Intake: 75 - 100 %    Nutrition Risk    Level of Risk/Frequency of Follow-up: moderate       Monitor and Evaluation    Food and Nutrient Intake: food and beverage intake  Anthropometric Measurements: weight  Biochemical Data, Medical Tests and Procedures: electrolyte and renal panel, glucose/endocrine profile  Nutrition-Focused Physical Findings: overall appearance, skin       Nutrition Follow-Up    RD Follow-up?: Yes

## 2023-07-07 NOTE — PT/OT/SLP PROGRESS
Occupational Therapy   Treatment    Name: Sadie Martinez  MRN: [id number]  Admitting Diagnosis:  S/P total knee replacement, right       Recommendations:     Discharge Recommendations: home health PT, home health OT  Discharge Equipment Recommendations:  to be determined by next level of care  Barriers to discharge:       Assessment:     Sadie Martinez is a 58 y.o. female with a medical diagnosis of S/P total knee replacement, right.  She presents with weakness and knee pain. Performance deficits affecting function are weakness, impaired endurance, impaired self care skills, impaired functional mobility, gait instability, impaired balance.     Rehab Prognosis:  Good; patient would benefit from acute skilled OT services to address these deficits and reach maximum level of function.       Plan:     Patient to be seen 5 x/week to address the above listed problems via self-care/home management, therapeutic activities, therapeutic exercises, neuromuscular re-education  Plan of Care Expires:    Plan of Care Reviewed with: patient    Subjective     Chief Complaint: no complaints  Patient/Family Comments/goals: to get better  Pain/Comfort:       Objective:     Communicated with: Nurse prior to session.  Patient found  in OT gym finishing with PTA  with   upon OT entry.    General Precautions: Standard, fall    Orthopedic Precautions:RLE weight bearing as tolerated  Braces: Knee immobilizer  Respiratory Status: Room air     Occupational Performance:     Bed Mobility:    Not tested secondary to sitting up in a chair     Functional Mobility/Transfers:  Patient completed Sit <> Stand Transfer with contact guard assistance  with  rolling walker   Functional Mobility: Min A to transfer within gym    Activities of Daily Living:  Not tested      [facility] 6 Click ADL:      Treatment & Education:  Pt performed B UE strengthening exercises to include:   Shoulder flexion   Chest press    Elbow flexion   Elbow extension   Pectoral  stretches   Bilateral rowing  All exercises performed 3x10 reps using 2# dowel and yellow t-band.  Patient also performed static and dynamic standing activity with Min A with no unsafe fatigue.     Patient left up in chair with all lines intact and call button in reach    GOALS:   Multidisciplinary Problems       Occupational Therapy Goals          Problem: Occupational Therapy    Goal Priority Disciplines Outcome Interventions   Occupational Therapy Goal     OT, PT/OT Ongoing, Progressing    Description: ST week    Pt will bathe self with SBA  Pt will perform LB dressing with SBA\  Pt will transfer toilet/BSC with mod I  Pt will perform standing task x 8 min with standby assistance      LT weeks  1. Pt will achieve max level of independence with self care.                         Time Tracking:     OT Date of Treatment: 23  OT Start Time: 900  OT Stop Time: 930  OT Total Time (min): 30 min    Billable Minutes:Therapeutic Activity 10 min  Therapeutic Exercise 20 min             RENY Pozo/L, CSRS  2023

## 2023-07-07 NOTE — PT/OT/SLP PROGRESS
Physical Therapy Treatment    Patient Name:  Sadie Martinez   MRN:  [id number]    Recommendations:     Discharge Recommendations: home health PT, home health OT  Discharge Equipment Recommendations: to be determined by next level of care  Barriers to discharge: Decreased caregiver support    Assessment:     Sadie Martinez is a 58 y.o. female admitted with a medical diagnosis of S/P total knee replacement, right.  She presents with the following impairments/functional limitations: weakness, impaired endurance, impaired functional mobility, decreased coordination, decreased lower extremity function, decreased ROM Patient reported having 7/10 pain in her right knee, but patient was still agreeable to work with therapy today.     Rehab Prognosis: Good; patient would benefit from acute skilled PT services to address these deficits and reach maximum level of function.    Recent Surgery: * No surgery found *      Plan:     During this hospitalization, patient to be seen BID (5 x/week) to address the identified rehab impairments via gait training, therapeutic activities, therapeutic exercises, neuromuscular re-education and progress toward the following goals:    Plan of Care Expires:  08/03/23    Subjective     Chief Complaint: weakness  Patient/Family Comments/goals: return home  Pain/Comfort:  Pain Rating 1: 7/10  Location - Side 1: Right  Location 1: knee  Pain Rating Post-Intervention 1: 7/10      Objective:     Communicated with PT prior to session.  Patient found up in chair with   upon PT entry to room.     General Precautions: Standard, fall  Orthopedic Precautions: RLE weight bearing as tolerated  Braces: Knee immobilizer  Respiratory Status: Room air     Functional Mobility:  Transfers:     Sit to Stand:  modified independence with rolling walker  Gait: Patient ambulated 110' with RW and supervision with no LOB      AM-PAC 6 CLICK MOBILITY  Turning over in bed (including adjusting bedclothes, sheets and blankets)?:  4  Sitting down on and standing up from a chair with arms (e.g., wheelchair, bedside commode, etc.): 4  Moving from lying on back to sitting on the side of the bed?: 4  Moving to and from a bed to a chair (including a wheelchair)?: 4  Need to walk in hospital room?: 4  Climbing 3-5 steps with a railing?: 3  Basic Mobility Total Score: 23       Treatment & Education:  Ambulation - 110'  Heel slides - 10 reps   Quad sets - 20 reps   Ankle pumps - 20 reps   Standing hip abduction - 20 reps   Sit to stands - 10 reps     Patient left up in chair with call button in reach..    GOALS:   Multidisciplinary Problems       Physical Therapy Goals          Problem: Physical Therapy    Goal Priority Disciplines Outcome Goal Variances Interventions   Physical Therapy Goal     PT, PT/OT Ongoing, Progressing     Description: Short Term Goals  Ambulate SBA - 200 feet with rolling walker assistive device.   Supine to sit stand-by  Sit to stand stand-by  SPT stand-by  0-100 knee flexion right le to allow normal sit to stand  5. Independent with HEP    Long Term Goals  Ambulate SBA - 400 feet with rolling walker assistive device.   Supine to sit independent without device  Sit to stand independent without device  SPT independent without device  Needed equipment for home.                              Time Tracking:     PT Received On: 07/07/23  PT Start Time: 1320     PT Stop Time: 1347  PT Total Time (min): 27 min     Billable Minutes: Gait Training 12 and Therapeutic Exercise 15    Treatment Type: Treatment  PT/PTA: PTA     Number of PTA visits since last PT visit: 5   DAISY Geller  07/07/2023

## 2023-07-08 LAB
GLUCOSE SERPL-MCNC: 171 MG/DL (ref 70–105)
GLUCOSE SERPL-MCNC: 185 MG/DL (ref 70–105)
GLUCOSE SERPL-MCNC: 217 MG/DL (ref 70–105)

## 2023-07-08 PROCEDURE — 63600175 PHARM REV CODE 636 W HCPCS: Performed by: FAMILY MEDICINE

## 2023-07-08 PROCEDURE — 25000003 PHARM REV CODE 250: Performed by: NURSE PRACTITIONER

## 2023-07-08 PROCEDURE — 63600175 PHARM REV CODE 636 W HCPCS: Performed by: NURSE PRACTITIONER

## 2023-07-08 PROCEDURE — 11000004 HC SNF PRIVATE

## 2023-07-08 PROCEDURE — 82962 GLUCOSE BLOOD TEST: CPT

## 2023-07-08 RX ADMIN — ENOXAPARIN SODIUM 60 MG: 60 INJECTION SUBCUTANEOUS at 05:07

## 2023-07-08 RX ADMIN — AMLODIPINE BESYLATE 5 MG: 5 TABLET ORAL at 10:07

## 2023-07-08 RX ADMIN — SENNOSIDES 8.6 MG: 8.6 TABLET, FILM COATED ORAL at 10:07

## 2023-07-08 RX ADMIN — INSULIN ASPART 6 UNITS: 100 INJECTION, SOLUTION INTRAVENOUS; SUBCUTANEOUS at 05:07

## 2023-07-08 RX ADMIN — PANTOPRAZOLE SODIUM 40 MG: 40 TABLET, DELAYED RELEASE ORAL at 10:07

## 2023-07-08 RX ADMIN — ASPIRIN 81 MG: 81 TABLET, COATED ORAL at 10:07

## 2023-07-08 RX ADMIN — HYDROCHLOROTHIAZIDE 25 MG: 12.5 TABLET ORAL at 10:07

## 2023-07-08 RX ADMIN — HYDROCODONE BITARTRATE AND ACETAMINOPHEN 1 TABLET: 7.5; 325 TABLET ORAL at 05:07

## 2023-07-08 RX ADMIN — ATORVASTATIN CALCIUM 40 MG: 40 TABLET, FILM COATED ORAL at 08:07

## 2023-07-08 RX ADMIN — HYDROCODONE BITARTRATE AND ACETAMINOPHEN 1 TABLET: 7.5; 325 TABLET ORAL at 11:07

## 2023-07-08 RX ADMIN — ACETAMINOPHEN 650 MG: 325 TABLET ORAL at 08:07

## 2023-07-08 NOTE — PLAN OF CARE
Problem: Adult Inpatient Plan of Care  Goal: Plan of Care Review  Outcome: Ongoing, Progressing  Goal: Patient-Specific Goal (Individualized)  Outcome: Ongoing, Progressing     Problem: Diabetes Comorbidity  Goal: Blood Glucose Level Within Targeted Range  Outcome: Ongoing, Progressing  Intervention: Monitor and Manage Glycemia  Flowsheets (Taken 7/8/2023 0131)  Glycemic Management: blood glucose monitored     Problem: Pain Acute  Goal: Acceptable Pain Control and Functional Ability  Outcome: Ongoing, Progressing  Intervention: Prevent or Manage Pain  Flowsheets (Taken 7/8/2023 0131)  Bowel Elimination Promotion:   adequate fluid intake promoted   ambulation promoted  Medication Review/Management: medications reviewed

## 2023-07-09 LAB
GLUCOSE SERPL-MCNC: 176 MG/DL (ref 70–105)
GLUCOSE SERPL-MCNC: 201 MG/DL (ref 70–105)
GLUCOSE SERPL-MCNC: 205 MG/DL (ref 70–105)
GLUCOSE SERPL-MCNC: 224 MG/DL (ref 70–105)

## 2023-07-09 PROCEDURE — 11000004 HC SNF PRIVATE

## 2023-07-09 PROCEDURE — 25000003 PHARM REV CODE 250: Performed by: NURSE PRACTITIONER

## 2023-07-09 PROCEDURE — 82962 GLUCOSE BLOOD TEST: CPT

## 2023-07-09 PROCEDURE — 63600175 PHARM REV CODE 636 W HCPCS: Performed by: FAMILY MEDICINE

## 2023-07-09 PROCEDURE — 63600175 PHARM REV CODE 636 W HCPCS: Performed by: NURSE PRACTITIONER

## 2023-07-09 RX ADMIN — HYDROCHLOROTHIAZIDE 25 MG: 12.5 TABLET ORAL at 09:07

## 2023-07-09 RX ADMIN — ENOXAPARIN SODIUM 60 MG: 60 INJECTION SUBCUTANEOUS at 04:07

## 2023-07-09 RX ADMIN — HYDROCODONE BITARTRATE AND ACETAMINOPHEN 1 TABLET: 7.5; 325 TABLET ORAL at 07:07

## 2023-07-09 RX ADMIN — HYDROCODONE BITARTRATE AND ACETAMINOPHEN 1 TABLET: 7.5; 325 TABLET ORAL at 06:07

## 2023-07-09 RX ADMIN — SENNOSIDES 8.6 MG: 8.6 TABLET, FILM COATED ORAL at 09:07

## 2023-07-09 RX ADMIN — AMLODIPINE BESYLATE 5 MG: 5 TABLET ORAL at 09:07

## 2023-07-09 RX ADMIN — PANTOPRAZOLE SODIUM 40 MG: 40 TABLET, DELAYED RELEASE ORAL at 09:07

## 2023-07-09 RX ADMIN — ASPIRIN 81 MG: 81 TABLET, COATED ORAL at 09:07

## 2023-07-09 RX ADMIN — ATORVASTATIN CALCIUM 40 MG: 40 TABLET, FILM COATED ORAL at 09:07

## 2023-07-09 RX ADMIN — INSULIN ASPART 4 UNITS: 100 INJECTION, SOLUTION INTRAVENOUS; SUBCUTANEOUS at 04:07

## 2023-07-09 RX ADMIN — HYDROCODONE BITARTRATE AND ACETAMINOPHEN 1 TABLET: 7.5; 325 TABLET ORAL at 01:07

## 2023-07-09 NOTE — PLAN OF CARE
Problem: Adult Inpatient Plan of Care  Goal: Patient-Specific Goal (Individualized)  Outcome: Ongoing, Progressing  Goal: Absence of Hospital-Acquired Illness or Injury  Outcome: Ongoing, Progressing  Intervention: Prevent Infection  Flowsheets (Taken 7/9/2023 0159)  Infection Prevention: hand hygiene promoted     Problem: Diabetes Comorbidity  Goal: Blood Glucose Level Within Targeted Range  Outcome: Ongoing, Progressing  Intervention: Monitor and Manage Glycemia  Flowsheets (Taken 7/9/2023 0159)  Glycemic Management: blood glucose monitored

## 2023-07-10 LAB
ANION GAP SERPL CALCULATED.3IONS-SCNC: 12 MMOL/L (ref 7–16)
BASOPHILS # BLD AUTO: 0.05 K/UL (ref 0–0.2)
BASOPHILS NFR BLD AUTO: 0.9 % (ref 0–1)
BUN SERPL-MCNC: 11 MG/DL (ref 7–18)
BUN/CREAT SERPL: 10 (ref 6–20)
CALCIUM SERPL-MCNC: 9.9 MG/DL (ref 8.5–10.1)
CHLORIDE SERPL-SCNC: 99 MMOL/L (ref 98–107)
CO2 SERPL-SCNC: 30 MMOL/L (ref 21–32)
CREAT SERPL-MCNC: 1.08 MG/DL (ref 0.55–1.02)
DIFFERENTIAL METHOD BLD: ABNORMAL
EGFR (NO RACE VARIABLE) (RUSH/TITUS): 59 ML/MIN/1.73M2
EOSINOPHIL # BLD AUTO: 0.1 K/UL (ref 0–0.5)
EOSINOPHIL NFR BLD AUTO: 1.9 % (ref 1–4)
ERYTHROCYTE [DISTWIDTH] IN BLOOD BY AUTOMATED COUNT: 13.3 % (ref 11.5–14.5)
GLUCOSE SERPL-MCNC: 183 MG/DL (ref 70–105)
GLUCOSE SERPL-MCNC: 200 MG/DL (ref 74–106)
GLUCOSE SERPL-MCNC: 223 MG/DL (ref 70–105)
GLUCOSE SERPL-MCNC: 255 MG/DL (ref 70–105)
GLUCOSE SERPL-MCNC: 261 MG/DL (ref 70–105)
GLUCOSE SERPL-MCNC: 266 MG/DL (ref 70–105)
HCT VFR BLD AUTO: 30.6 % (ref 38–47)
HGB BLD-MCNC: 9.8 G/DL (ref 12–16)
LYMPHOCYTES # BLD AUTO: 1.96 K/UL (ref 1–4.8)
LYMPHOCYTES NFR BLD AUTO: 36.4 % (ref 27–41)
MCH RBC QN AUTO: 28 PG (ref 27–31)
MCHC RBC AUTO-ENTMCNC: 32 G/DL (ref 32–36)
MCV RBC AUTO: 87.4 FL (ref 80–96)
MONOCYTES # BLD AUTO: 0.56 K/UL (ref 0–0.8)
MONOCYTES NFR BLD AUTO: 10.4 % (ref 2–6)
MPC BLD CALC-MCNC: 9 FL (ref 9.4–12.4)
NEUTROPHILS # BLD AUTO: 2.71 K/UL (ref 1.8–7.7)
NEUTROPHILS NFR BLD AUTO: 50.4 % (ref 53–65)
PLATELET # BLD AUTO: 441 K/UL (ref 150–400)
POTASSIUM SERPL-SCNC: 3.1 MMOL/L (ref 3.5–5.1)
RBC # BLD AUTO: 3.5 M/UL (ref 4.2–5.4)
SODIUM SERPL-SCNC: 138 MMOL/L (ref 136–145)
WBC # BLD AUTO: 5.38 K/UL (ref 4.5–11)

## 2023-07-10 PROCEDURE — 97530 THERAPEUTIC ACTIVITIES: CPT

## 2023-07-10 PROCEDURE — 27000982 HC MATTRESS, MATRIX LAL RENTAL

## 2023-07-10 PROCEDURE — 63600175 PHARM REV CODE 636 W HCPCS: Performed by: NURSE PRACTITIONER

## 2023-07-10 PROCEDURE — 85025 COMPLETE CBC W/AUTO DIFF WBC: CPT | Performed by: NURSE PRACTITIONER

## 2023-07-10 PROCEDURE — 25000003 PHARM REV CODE 250: Performed by: NURSE PRACTITIONER

## 2023-07-10 PROCEDURE — 11000004 HC SNF PRIVATE

## 2023-07-10 PROCEDURE — 97110 THERAPEUTIC EXERCISES: CPT

## 2023-07-10 PROCEDURE — 27000944

## 2023-07-10 PROCEDURE — 63600175 PHARM REV CODE 636 W HCPCS: Performed by: FAMILY MEDICINE

## 2023-07-10 PROCEDURE — 80048 BASIC METABOLIC PNL TOTAL CA: CPT | Performed by: NURSE PRACTITIONER

## 2023-07-10 PROCEDURE — 97116 GAIT TRAINING THERAPY: CPT

## 2023-07-10 PROCEDURE — 82962 GLUCOSE BLOOD TEST: CPT

## 2023-07-10 RX ORDER — HYDROCODONE BITARTRATE AND ACETAMINOPHEN 7.5; 325 MG/1; MG/1
1 TABLET ORAL EVERY 6 HOURS PRN
Status: DISCONTINUED | OUTPATIENT
Start: 2023-07-10 | End: 2023-07-12 | Stop reason: HOSPADM

## 2023-07-10 RX ORDER — POTASSIUM CHLORIDE 20 MEQ/1
20 TABLET, EXTENDED RELEASE ORAL 2 TIMES DAILY
Status: COMPLETED | OUTPATIENT
Start: 2023-07-10 | End: 2023-07-10

## 2023-07-10 RX ADMIN — HYDROCHLOROTHIAZIDE 25 MG: 12.5 TABLET ORAL at 10:07

## 2023-07-10 RX ADMIN — POTASSIUM CHLORIDE 20 MEQ: 1500 TABLET, EXTENDED RELEASE ORAL at 11:07

## 2023-07-10 RX ADMIN — SENNOSIDES 8.6 MG: 8.6 TABLET, FILM COATED ORAL at 10:07

## 2023-07-10 RX ADMIN — PANTOPRAZOLE SODIUM 40 MG: 40 TABLET, DELAYED RELEASE ORAL at 10:07

## 2023-07-10 RX ADMIN — AMLODIPINE BESYLATE 5 MG: 5 TABLET ORAL at 10:07

## 2023-07-10 RX ADMIN — HYDROCODONE BITARTRATE AND ACETAMINOPHEN 1 TABLET: 7.5; 325 TABLET ORAL at 02:07

## 2023-07-10 RX ADMIN — ATORVASTATIN CALCIUM 40 MG: 40 TABLET, FILM COATED ORAL at 08:07

## 2023-07-10 RX ADMIN — HYDROCODONE BITARTRATE AND ACETAMINOPHEN 1 TABLET: 7.5; 325 TABLET ORAL at 07:07

## 2023-07-10 RX ADMIN — HYDROCODONE BITARTRATE AND ACETAMINOPHEN 1 TABLET: 7.5; 325 TABLET ORAL at 08:07

## 2023-07-10 RX ADMIN — ENOXAPARIN SODIUM 60 MG: 60 INJECTION SUBCUTANEOUS at 04:07

## 2023-07-10 RX ADMIN — INSULIN ASPART 3 UNITS: 100 INJECTION, SOLUTION INTRAVENOUS; SUBCUTANEOUS at 08:07

## 2023-07-10 RX ADMIN — POTASSIUM CHLORIDE 20 MEQ: 1500 TABLET, EXTENDED RELEASE ORAL at 08:07

## 2023-07-10 RX ADMIN — ASPIRIN 81 MG: 81 TABLET, COATED ORAL at 10:07

## 2023-07-10 RX ADMIN — HYDROCODONE BITARTRATE AND ACETAMINOPHEN 1 TABLET: 7.5; 325 TABLET ORAL at 01:07

## 2023-07-10 NOTE — PLAN OF CARE
Problem: Adult Inpatient Plan of Care  Goal: Patient-Specific Goal (Individualized)  Outcome: Ongoing, Progressing  Goal: Absence of Hospital-Acquired Illness or Injury  Outcome: Ongoing, Progressing  Intervention: Prevent and Manage VTE (Venous Thromboembolism) Risk  Flowsheets (Taken 7/9/2023 2323)  Activity Management: Ambulated to bathroom - L4  VTE Prevention/Management: ambulation promoted  Range of Motion: active ROM (range of motion) encouraged  Intervention: Prevent Infection  Flowsheets (Taken 7/9/2023 2323)  Infection Prevention: hand hygiene promoted     Problem: Diabetes Comorbidity  Goal: Blood Glucose Level Within Targeted Range  Outcome: Ongoing, Progressing  Intervention: Monitor and Manage Glycemia  Flowsheets (Taken 7/9/2023 2323)  Glycemic Management:   blood glucose monitored   supplemental insulin given

## 2023-07-10 NOTE — ASSESSMENT & PLAN NOTE
PT/OT eval and treat        07/06/23 walked 105 feet with therapy yesterday, states she still feels unsteady    07/10/23 dc staples and steri strip

## 2023-07-10 NOTE — PLAN OF CARE
Problem: Physical Therapy  Goal: Physical Therapy Goal  Description: Short Term Goals  Ambulate SBA - 200 feet with rolling walker assistive device.   Supine to sit stand-by  Sit to stand stand-by  SPT stand-by  0-100 knee flexion right le to allow normal sit to stand  5. Independent with HEP    Long Term Goals  Ambulate SBA - 400 feet with rolling walker assistive device.   Supine to sit independent without device  Sit to stand independent without device  SPT independent without device  Needed equipment for home.         Outcome: Ongoing, Progressing     Patient's plan of care expiration date updated to match [employer] received by insurance company (7/12/2023).     Petey Mistry, PT, DPT  7/10/2023  3:33 PM

## 2023-07-10 NOTE — PLAN OF CARE
[facility]  Discharge Assessment    Primary Care Provider: Indra Matos MD     Discharge Assessment (most recent)       BRIEF DISCHARGE ASSESSMENT - 07/10/23 0944          Discharge Planning    Assessment Type Discharge Planning Brief Assessment (P)      Resource/Environmental Concerns none (P)      Patient/Family Anticipates Transition to home (P)      Patient/Family Anticipated Services at Transition home health care (P)      DME Needed Upon Discharge  none (P)      Discharge Plan A Home;Home Health (P)                    Spoke with Ms. Martinez, she is anticipating discharging home this week with [facility].  She currently have  a walker, cane and glucometer at home.

## 2023-07-10 NOTE — PROGRESS NOTES
[facility]  Hospital Medicine  Progress Note    Patient Name: Sadie Martinez  MRN: [id number]  Patient Class: IP- Swing   Admission Date: 6/30/2023  Length of Stay: 10 days  Attending Physician: Mani Higginbotham IV, DO  Primary Care Provider: Indra Matos MD        Subjective:     Principal Problem:S/P total knee replacement, right        HPI:  Chief complaint:  Right knee pain.     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  Patient states she injured her right knee at work in 2017.  States has had discomfort with it since.  More than a year ago she had a meniscus tear in right knee requiring a knee scope.  Has had continued pain leading to her knee surgery.  Denies any significant pain to her left knee.      Review of system:  See above.  Wears glasses without recent change in vision.  States also has some chronic back issues which she states initiated at her work.  Denies any snoring and states sleep issues seemed to come from her back and chronic knee pain.  Review of systems otherwise.     Past medical history:  -hypertension greater than 15 years.  -diabetes mellitus type 2 treated with oral agents  -diagnosed 2020 with breast cancer status post left mastectomy.  No chemotherapy or radiation needed.  Followed by Dr. Warner.     Past surgical history:  Right knee scope  Right total knee replacement this admission  Previous left mastectomy in 2020  Hysterectomy with bilateral salpingo-oophorectomy  Low back surgery     Patient list Mobic as an allergy     Social history:  Lives by herself  Has children that live in the area that could possibly help her as needed  Would like to go to swing bed following this admission  No history of tobacco use  Drink alcohol only occasionally     Family history:  Mother had heart problems with heart attack starting her 60s     Health maintenance issues:  Primary  care provider is Melanie Watts NP  Had flu shot last fall  No prior Pneumovax but recommended she get wanted age 63  Had mammogram in April  Last Pap smear 3 years earlier  Scheduled for colonoscopy next year, states prior colonoscopy showed an area they were concerned about infection and they found no polyps but told her to repeat it next year              Overview/Hospital Course:  06/26 Not much pain. Ortho note seen with need to return to surgery with abnormal position prosthesis. Discussed with Dr Warner and will stop her Arimidex for two weeks to lower risk of DVT. Discussed with pt and she understands. BP and BS Ok. Correct electrolytes.   06/27 Seen prior to surgery in good spirits. Seems medically stable to go. Plan rehab at [place]. BP and BS good.      Overview/Hospital Course:  07/05/23 Admitted to swing bed on 06/30/23 s/p right TKR. She has ambulated 140 feet with rolling walker. Potassium is 3.2 this morning. Will give replacement po.    07/06/23 Awake and resting in bed . Ace wrap to right lower leg. Working with therapy. Ambulated 105 feet with rolling walker. States she stills feels unsteady. She lives alone and will be returning to her home alone. Continue therapy. Requests dulcolax.      07/10/23 Awake and sitting up on the side of the bed. Dressing intact to right knee. Staples intact. Incision has healed well. Will dc staples and steri strip area. Potassium is low at 3.1. Will replace orally. Follow up appt with Dr. Indra Matos on 07/14/23.  Chaim SHIRLEY. Talked with her this. Will dc home on Wednesday.      Interval History:     07/10/23 Awake and sitting up on the side of the bed. Dressing intact to right knee. Staples intact. Incision has healed well. Will dc staples and steri strip area. Potassium is low at 3.1. Will replace orally. Follow up appt with Dr. Indra Matos on 07/14/23.   Review of Systems   Constitutional:  Negative for appetite change, chills and fever.   Respiratory:  Negative for  shortness of breath.    Cardiovascular:  Negative for chest pain.   Gastrointestinal:  Negative for abdominal pain, constipation, diarrhea, nausea and vomiting.   Genitourinary:  Negative for difficulty urinating and vaginal pain.   Musculoskeletal:  Positive for arthralgias.        States pain at worst is at level 7 , states pain med decreases to around a level 5   Neurological:  Negative for weakness.   Objective:     Vital Signs (Most Recent):  Temp: 97.9 °F (36.6 °C) (07/10/23 0730)  Pulse: 93 (07/10/23 0730)  Resp: 18 (07/10/23 0744)  BP: (!) 150/80 (07/10/23 0730)  SpO2: 99 % (07/10/23 0730) Vital Signs (24h Range):  Temp:  [97.7 °F (36.5 °C)-98.4 °F (36.9 °C)] 97.9 °F (36.6 °C)  Pulse:  [78-93] 93  Resp:  [16-20] 18  SpO2:  [98 %-100 %] 99 %  BP: (117-150)/(38-80) 150/80     Weight: 73.9 kg (163 lb)  Body mass index is 29.81 kg/m².    Intake/Output Summary (Last 24 hours) at 7/10/2023 1000  Last data filed at 7/10/2023 0900  Gross per 24 hour   Intake 1200 ml   Output --   Net 1200 ml         Physical Exam  Constitutional:       Appearance: Normal appearance. She is normal weight.   HENT:      Head: Normocephalic.      Nose: Nose normal.      Mouth/Throat:      Mouth: Mucous membranes are moist.      Pharynx: Oropharynx is clear.   Cardiovascular:      Rate and Rhythm: Normal rate and regular rhythm.      Heart sounds: Normal heart sounds.   Pulmonary:      Effort: Pulmonary effort is normal.      Breath sounds: Normal breath sounds.   Abdominal:      General: Bowel sounds are normal.   Musculoskeletal:         General: Normal range of motion.      Cervical back: Normal range of motion and neck supple.   Skin:     General: Skin is warm and dry.      Comments: Staples intact , incision has healed well. Will dc staples and steri strip area   Neurological:      General: No focal deficit present.      Mental Status: She is alert and oriented to person, place, and time.   Psychiatric:         Mood and Affect: Mood  normal.           Significant Labs: All pertinent labs within the past 24 hours have been reviewed.  Recent Lab Results  (Last 5 results in the past 24 hours)        07/10/23  0726   07/10/23  0626   07/09/23  2108   07/09/23  1614   07/09/23  1122        Anion Gap 12               Baso # 0.05               Basophil % 0.9               BUN 11               BUN/CREAT RATIO 10               Calcium 9.9               Chloride 99               CO2 30               Creatinine 1.08               Differential Type Auto               eGFR 59               Eos # 0.10               Eosinophil % 1.9               Glucose 200               Hematocrit 30.6               Hemoglobin 9.8               Lymph # 1.96               Lymph % 36.4               MCH 28.0               MCHC 32.0               MCV 87.4               Mono # 0.56               Mono % 10.4               MPV 9.0               Neutrophils, Abs 2.71               Neutrophils Relative 50.4               Platelets 441               POC Glucose   183   176   224   205       Potassium 3.1               RBC 3.50               RDW 13.3               Sodium 138               WBC 5.38                                      Significant Imaging: I have reviewed all pertinent imaging results/findings within the past 24 hours.      Assessment/Plan:      * S/P total knee replacement, right  PT/OT eval and treat        07/06/23 walked 105 feet with therapy yesterday, states she still feels unsteady    07/10/23 dc staples and steri strip     Generalized weakness    07/05/23 Continue therapy for strengthening    07/06/23 continue therapy     07/10/23 FERN [place]. Last covered day is Wednesday- talked with her re this and she states she will be ready to dc Wednesday.     Type 2 diabetes mellitus without complication, without long-term current use of insulin    Lab Results   Component Value Date    HGBA1C 6.2 06/27/2023        Antihyperglycemics (From admission, onward)    Start      Stop Route Frequency Ordered    07/03/23 0952  insulin aspart U-100 injection 1-10 Units         -- SubQ Before meals & nightly PRN 07/03/23 0852        Hold Oral hypoglycemics while patient is in the hospital.    07/05/23 dcd glipizide , on moderate SSI coverage    07/06/23 continue to monitor on SSI coverage     07/10/23 stable    Systolic hypertension  116/70 monitor and titrate PRN        VTE Risk Mitigation (From admission, onward)         Ordered     enoxaparin injection 60 mg  Daily         06/30/23 2119     Place SONI hose  Until discontinued         06/30/23 2119     IP VTE HIGH RISK PATIENT  Once         06/30/23 2119                Discharge Planning   GERRI: 7/14/2023     Code Status: Full Code   Is the patient medically ready for discharge?:     Reason for patient still in hospital (select all that apply): Treatment  Discharge Plan A: Home, Home Health                  WENDY Pabon  Department of Hospital Medicine   [facility]

## 2023-07-10 NOTE — PT/OT/SLP PROGRESS
Occupational Therapy   Treatment    Name: Sadie Martinez  MRN: [id number]  Admitting Diagnosis:  S/P total knee replacement, right       Recommendations:     Discharge Recommendations: home health PT, home health OT  Discharge Equipment Recommendations:  to be determined by next level of care  Barriers to discharge:       Assessment:     Sadie Martinez is a 58 y.o. female with a medical diagnosis of S/P total knee replacement, right.  She presents with weakness. Performance deficits affecting function are weakness, impaired endurance, impaired self care skills, impaired functional mobility, gait instability, impaired balance.     Rehab Prognosis:  Good; patient would benefit from acute skilled OT services to address these deficits and reach maximum level of function.       Plan:     Patient to be seen 5 x/week to address the above listed problems via self-care/home management, therapeutic activities, therapeutic exercises, neuromuscular re-education  Plan of Care Expires:    Plan of Care Reviewed with: patient    Subjective     Chief Complaint: Pt had no complaints this date  Patient/Family Comments/goals: return home  Pain/Comfort:       Objective:     Communicated with: Pt prior to session.  Patient found up in chair with   upon OT entry to room.    General Precautions: Standard, fall    Orthopedic Precautions:RLE weight bearing as tolerated  Braces: Knee immobilizer  Respiratory Status: Room air     Occupational Performance:     Bed Mobility:         Functional Mobility/Transfers:  Patient completed Sit <> Stand Transfer with supervision  with  rolling walker   Functional Mobility:     Activities of Daily Living:        [facility] 6 Click ADL:      Treatment & Education:  Pt completed BUE sh flex, chest press, and elbow flex with 2# dowel, and Sh IR/ER and tricep press with green Tband sitting up in chair.    Patient left up in chair with call button in reach    GOALS:   Multidisciplinary Problems       Occupational  Therapy Goals          Problem: Occupational Therapy    Goal Priority Disciplines Outcome Interventions   Occupational Therapy Goal     OT, PT/OT Ongoing, Progressing    Description: ST week    Pt will bathe self with SBA  Pt will perform LB dressing with SBA\  Pt will transfer toilet/BSC with mod I  Pt will perform standing task x 8 min with standby assistance      LT weeks  1. Pt will achieve max level of independence with self care.                         Time Tracking:     OT Date of Treatment: 07/10/23  OT Start Time: 1100  OT Stop Time: 1123  OT Total Time (min): 23 min    Billable Minutes:Therapeutic Activity 10  Therapeutic Exercise 13               7/10/2023

## 2023-07-10 NOTE — SUBJECTIVE & OBJECTIVE
Interval History:     07/10/23 Awake and sitting up on the side of the bed. Dressing intact to right knee. Staples intact. Incision has healed well. Will dc staples and steri strip area. Potassium is low at 3.1. Will replace orally. Follow up appt with Dr. Indra Matos on 07/14/23.   Review of Systems   Constitutional:  Negative for appetite change, chills and fever.   Respiratory:  Negative for shortness of breath.    Cardiovascular:  Negative for chest pain.   Gastrointestinal:  Negative for abdominal pain, constipation, diarrhea, nausea and vomiting.   Genitourinary:  Negative for difficulty urinating and vaginal pain.   Musculoskeletal:  Positive for arthralgias.        States pain at worst is at level 7 , states pain med decreases to around a level 5   Neurological:  Negative for weakness.   Objective:     Vital Signs (Most Recent):  Temp: 97.9 °F (36.6 °C) (07/10/23 0730)  Pulse: 93 (07/10/23 0730)  Resp: 18 (07/10/23 0744)  BP: (!) 150/80 (07/10/23 0730)  SpO2: 99 % (07/10/23 0730) Vital Signs (24h Range):  Temp:  [97.7 °F (36.5 °C)-98.4 °F (36.9 °C)] 97.9 °F (36.6 °C)  Pulse:  [78-93] 93  Resp:  [16-20] 18  SpO2:  [98 %-100 %] 99 %  BP: (117-150)/(38-80) 150/80     Weight: 73.9 kg (163 lb)  Body mass index is 29.81 kg/m².    Intake/Output Summary (Last 24 hours) at 7/10/2023 1000  Last data filed at 7/10/2023 0900  Gross per 24 hour   Intake 1200 ml   Output --   Net 1200 ml         Physical Exam  Constitutional:       Appearance: Normal appearance. She is normal weight.   HENT:      Head: Normocephalic.      Nose: Nose normal.      Mouth/Throat:      Mouth: Mucous membranes are moist.      Pharynx: Oropharynx is clear.   Cardiovascular:      Rate and Rhythm: Normal rate and regular rhythm.      Heart sounds: Normal heart sounds.   Pulmonary:      Effort: Pulmonary effort is normal.      Breath sounds: Normal breath sounds.   Abdominal:      General: Bowel sounds are normal.   Musculoskeletal:         General:  Normal range of motion.      Cervical back: Normal range of motion and neck supple.   Skin:     General: Skin is warm and dry.      Comments: Staples intact , incision has healed well. Will dc staples and steri strip area   Neurological:      General: No focal deficit present.      Mental Status: She is alert and oriented to person, place, and time.   Psychiatric:         Mood and Affect: Mood normal.           Significant Labs: All pertinent labs within the past 24 hours have been reviewed.  Recent Lab Results  (Last 5 results in the past 24 hours)        07/10/23  0726   07/10/23  0626   07/09/23  2108   07/09/23  1614   07/09/23  1122        Anion Gap 12               Baso # 0.05               Basophil % 0.9               BUN 11               BUN/CREAT RATIO 10               Calcium 9.9               Chloride 99               CO2 30               Creatinine 1.08               Differential Type Auto               eGFR 59               Eos # 0.10               Eosinophil % 1.9               Glucose 200               Hematocrit 30.6               Hemoglobin 9.8               Lymph # 1.96               Lymph % 36.4               MCH 28.0               MCHC 32.0               MCV 87.4               Mono # 0.56               Mono % 10.4               MPV 9.0               Neutrophils, Abs 2.71               Neutrophils Relative 50.4               Platelets 441               POC Glucose   183   176   224   205       Potassium 3.1               RBC 3.50               RDW 13.3               Sodium 138               WBC 5.38                                      Significant Imaging: I have reviewed all pertinent imaging results/findings within the past 24 hours.

## 2023-07-10 NOTE — ASSESSMENT & PLAN NOTE
Lab Results   Component Value Date    HGBA1C 6.2 06/27/2023        Antihyperglycemics (From admission, onward)    Start     Stop Route Frequency Ordered    07/03/23 0952  insulin aspart U-100 injection 1-10 Units         -- SubQ Before meals & nightly PRN 07/03/23 0852        Hold Oral hypoglycemics while patient is in the hospital.    07/05/23 dcd glipizide , on moderate SSI coverage    07/06/23 continue to monitor on SSI coverage     07/10/23 stable

## 2023-07-10 NOTE — ASSESSMENT & PLAN NOTE
07/05/23 Continue therapy for strengthening    07/06/23 continue therapy     07/10/23 FERN [place]. Last covered day is Wednesday- talked with her re this and she states she will be ready to dc Wednesday.

## 2023-07-10 NOTE — PLAN OF CARE
Problem: Fall Injury Risk  Goal: Absence of Fall and Fall-Related Injury  Outcome: Ongoing, Progressing  Intervention: Promote Injury-Free Environment  Flowsheets (Taken 7/10/2023 4153)  Safety Promotion/Fall Prevention:   assistive device/personal item within reach   nonskid shoes/socks when out of bed   side rails raised x 3   instructed to call staff for mobility   in recliner, wheels locked

## 2023-07-10 NOTE — PT/OT/SLP PROGRESS
Physical Therapy Treatment    Patient Name:  Sadie Martinez   MRN:  [id number]    Recommendations:     Discharge Recommendations: home health PT, home health OT  Discharge Equipment Recommendations: to be determined by next level of care  Barriers to discharge: Decreased caregiver support    Assessment:     Sadie Martinez is a 58 y.o. female admitted with a medical diagnosis of S/P total knee replacement, right.  She presents with the following impairments/functional limitations: weakness, impaired endurance, impaired functional mobility, decreased lower extremity function, pain, decreased ROM Patient was willing to participate with therapy this afternoon.    Rehab Prognosis: Good; patient would benefit from acute skilled PT services to address these deficits and reach maximum level of function.    Recent Surgery: * No surgery found *      Plan:     During this hospitalization, patient to be seen BID (5 x/week) to address the identified rehab impairments via gait training, therapeutic activities, therapeutic exercises, neuromuscular re-education and progress toward the following goals:    Plan of Care Expires:  08/03/23    Subjective     Chief Complaint: weakness  Patient/Family Comments/goals: return home  Pain/Comfort:  Pain Rating 1: 0/10      Objective:     Communicated with PT prior to session.  Patient found up in chair with   upon PT entry to room.     General Precautions: Standard, fall  Orthopedic Precautions: RLE weight bearing as tolerated  Braces: Knee immobilizer  Respiratory Status: Room air     Functional Mobility:  Transfers:     Sit to Stand:  modified independence with rolling walker  Gait: Patient ambulated 320' x 2 with RW and supervision with no LOB      AM-PAC 6 CLICK MOBILITY  Turning over in bed (including adjusting bedclothes, sheets and blankets)?: 4  Sitting down on and standing up from a chair with arms (e.g., wheelchair, bedside commode, etc.): 4  Moving from lying on back to sitting on  the side of the bed?: 4  Moving to and from a bed to a chair (including a wheelchair)?: 4  Need to walk in hospital room?: 4  Climbing 3-5 steps with a railing?: 3  Basic Mobility Total Score: 23       Treatment & Education:  Ambulation - 640' total   Nu step - 8 minutes   Long arc quads - 20 reps   Heel slides - 10 reps   Sit to stands - 10 reps     Patient left up in chair with call button in reach..    GOALS:   Multidisciplinary Problems       Physical Therapy Goals          Problem: Physical Therapy    Goal Priority Disciplines Outcome Goal Variances Interventions   Physical Therapy Goal     PT, PT/OT Ongoing, Progressing     Description: Short Term Goals  Ambulate SBA - 200 feet with rolling walker assistive device.   Supine to sit stand-by  Sit to stand stand-by  SPT stand-by  0-100 knee flexion right le to allow normal sit to stand  5. Independent with HEP    Long Term Goals  Ambulate SBA - 400 feet with rolling walker assistive device.   Supine to sit independent without device  Sit to stand independent without device  SPT independent without device  Needed equipment for home.                              Time Tracking:     PT Received On: 07/10/23  PT Start Time: 1435     PT Stop Time: 1516  PT Total Time (min): 41 min     Billable Minutes: Gait Training 12 and Therapeutic Exercise 15    Treatment Type: Treatment  PT/PTA: PTA     Number of PTA visits since last PT visit: 1   DAISY Geller  07/10/2023

## 2023-07-10 NOTE — PT/OT/SLP PROGRESS
Physical Therapy Treatment    Patient Name:  Sadie Martinez   MRN:  [id number]    Recommendations:     Discharge Recommendations: home health PT, home health OT  Discharge Equipment Recommendations: to be determined by next level of care  Barriers to discharge: Decreased caregiver support    Assessment:     Sadie Martinez is a 58 y.o. female admitted with a medical diagnosis of S/P total knee replacement, right.  She presents with the following impairments/functional limitations: weakness, impaired endurance, impaired functional mobility, gait instability, impaired balance, decreased coordination, decreased lower extremity function, decreased safety awareness, pain, decreased ROM, impaired coordination, impaired muscle length .  Patient ambulating increased distances this morning with improved carryover for verbal cues to improve right knee extension with swing.  She did well with stair training with min verbal cues needed to ascend/descend 3 steps correctly.  Patient reports anticipated discharge home 23.     Rehab Prognosis: Good; patient would benefit from acute skilled PT services to address these deficits and reach maximum level of function.    Recent Surgery: * No surgery found *      Plan:     During this hospitalization, patient to be seen BID to address the identified rehab impairments via gait training, therapeutic activities, therapeutic exercises, neuromuscular re-education and progress toward the following goals:    Plan of Care Expires:  23    Subjective     Chief Complaint: Patient identified via name and .  She reports she is to have right knee surgical incision staples removed today.  Reports mild right knee stiffness.   Patient/Family Comments/goals: improve strength and mobility; safety return home  Pain/Comfort:  Pain Rating 1: 3/10  Location - Side 1: Right  Location 1: knee  Pain Addressed 1: Pre-medicate for activity, Cessation of Activity  Pain Rating Post-Intervention 1:  6/10      Objective:     Communicated with OT, primary PT/PTA prior to session.  Patient found up in chair with knee immobilizer upon PT entry to room.     General Precautions: Standard, fall  Orthopedic Precautions: RLE weight bearing as tolerated  Braces: Knee immobilizer  Respiratory Status: Room air     Functional Mobility:  Transfers:     Sit to Stand:  supervision with rolling walker  Bed to Chair: supervision with  rolling walker  using  Step Transfer  Toilet Transfer: supervision with  rolling walker  using  Step Transfer  Gait: Patient ambulated 320 feet to therapy gym and then 320 feet back to room with RW and SBA with verbal cues needed for step continuity, improved right knee flexion with swing, and improve step length  Stairs:  Pt ascended/descended 3 stair(s) with No Assistive Device with right handrail with Contact Guard Assistance.       AM-PAC 6 CLICK MOBILITY  Turning over in bed (including adjusting bedclothes, sheets and blankets)?: 4  Sitting down on and standing up from a chair with arms (e.g., wheelchair, bedside commode, etc.): 4  Moving from lying on back to sitting on the side of the bed?: 4  Moving to and from a bed to a chair (including a wheelchair)?: 4  Need to walk in hospital room?: 4  Climbing 3-5 steps with a railing?: 3  Basic Mobility Total Score: 23       Treatment & Education:  Nu step BUE/LE with no resistance x 5 minutes   LAQ RLE x 10 reps  Seated RLE heel slides with 5 second hold x 10 reps    Patient left up in chair with all lines intact and call button in reach..    GOALS:   Multidisciplinary Problems       Physical Therapy Goals          Problem: Physical Therapy    Goal Priority Disciplines Outcome Goal Variances Interventions   Physical Therapy Goal     PT, PT/OT Ongoing, Progressing     Description: Short Term Goals  Ambulate SBA - 200 feet with rolling walker assistive device.   Supine to sit stand-by  Sit to stand stand-by  SPT stand-by  0-100 knee flexion right le  to allow normal sit to stand  5. Independent with HEP    Long Term Goals  Ambulate SBA - 400 feet with rolling walker assistive device.   Supine to sit independent without device  Sit to stand independent without device  SPT independent without device  Needed equipment for home.                              Time Tracking:     PT Received On: 07/10/23  PT Start Time: 0950     PT Stop Time: 1027  PT Total Time (min): 37 min     Billable Minutes: Gait Training 20 minutes, Therapeutic Activity 7 minutes, and Therapeutic Exercise 10 minutes    Treatment Type: 6th Visit  PT/PTA: PT     Number of PTA visits since last PT visit: 0     07/10/2023

## 2023-07-11 LAB
GLUCOSE SERPL-MCNC: 193 MG/DL (ref 70–105)
GLUCOSE SERPL-MCNC: 199 MG/DL (ref 70–105)
GLUCOSE SERPL-MCNC: 210 MG/DL (ref 70–105)
GLUCOSE SERPL-MCNC: 244 MG/DL (ref 70–105)

## 2023-07-11 PROCEDURE — 11000004 HC SNF PRIVATE

## 2023-07-11 PROCEDURE — 27000982 HC MATTRESS, MATRIX LAL RENTAL

## 2023-07-11 PROCEDURE — 97535 SELF CARE MNGMENT TRAINING: CPT

## 2023-07-11 PROCEDURE — 25000003 PHARM REV CODE 250: Performed by: NURSE PRACTITIONER

## 2023-07-11 PROCEDURE — 97110 THERAPEUTIC EXERCISES: CPT | Mod: CQ

## 2023-07-11 PROCEDURE — 63600175 PHARM REV CODE 636 W HCPCS: Performed by: NURSE PRACTITIONER

## 2023-07-11 PROCEDURE — 97116 GAIT TRAINING THERAPY: CPT | Mod: CQ

## 2023-07-11 PROCEDURE — 82962 GLUCOSE BLOOD TEST: CPT

## 2023-07-11 PROCEDURE — 97530 THERAPEUTIC ACTIVITIES: CPT

## 2023-07-11 PROCEDURE — 27000944

## 2023-07-11 RX ADMIN — ASPIRIN 81 MG: 81 TABLET, COATED ORAL at 09:07

## 2023-07-11 RX ADMIN — PANTOPRAZOLE SODIUM 40 MG: 40 TABLET, DELAYED RELEASE ORAL at 09:07

## 2023-07-11 RX ADMIN — AMLODIPINE BESYLATE 5 MG: 5 TABLET ORAL at 09:07

## 2023-07-11 RX ADMIN — HYDROCHLOROTHIAZIDE 25 MG: 12.5 TABLET ORAL at 09:07

## 2023-07-11 RX ADMIN — SENNOSIDES 8.6 MG: 8.6 TABLET, FILM COATED ORAL at 09:07

## 2023-07-11 RX ADMIN — ATORVASTATIN CALCIUM 40 MG: 40 TABLET, FILM COATED ORAL at 09:07

## 2023-07-11 RX ADMIN — HYDROCODONE BITARTRATE AND ACETAMINOPHEN 1 TABLET: 7.5; 325 TABLET ORAL at 09:07

## 2023-07-11 RX ADMIN — ENOXAPARIN SODIUM 60 MG: 60 INJECTION SUBCUTANEOUS at 04:07

## 2023-07-11 RX ADMIN — HYDROCODONE BITARTRATE AND ACETAMINOPHEN 1 TABLET: 7.5; 325 TABLET ORAL at 03:07

## 2023-07-11 NOTE — PT/OT/SLP PROGRESS
Physical Therapy Treatment    Patient Name:  Sadie Martinez   MRN:  [id number]    Recommendations:     Discharge Recommendations: home health PT, home health OT  Discharge Equipment Recommendations: none  Barriers to discharge: Decreased caregiver support    Assessment:     Sadie Martinez is a 58 y.o. female admitted with a medical diagnosis of S/P total knee replacement, right.  She presents with the following impairments/functional limitations: weakness, impaired endurance, impaired functional mobility, decreased lower extremity function, pain, decreased ROM Patient was willing to participate with therapy this afternoon.     Rehab Prognosis: Good; patient would benefit from acute skilled PT services to address these deficits and reach maximum level of function.    Recent Surgery: * No surgery found *      Plan:     During this hospitalization, patient to be seen BID (5 x/week) to address the identified rehab impairments via gait training, therapeutic activities, therapeutic exercises, neuromuscular re-education and progress toward the following goals:    Plan of Care Expires:  08/03/23    Subjective     Chief Complaint: weakness  Patient/Family Comments/goals: return home  Pain/Comfort:  Pain Rating 1: 6/10  Location - Side 1: Right  Location 1: knee  Pain Rating Post-Intervention 1: 6/10      Objective:     Communicated with PT prior to session.  Patient found up in chair with   upon PT entry to room.     General Precautions: Standard, fall  Orthopedic Precautions: RLE weight bearing as tolerated  Braces: Knee immobilizer  Respiratory Status: Room air     Functional Mobility:  Transfers:     Sit to Stand:  modified independence with rolling walker  Gait: Patient ambulated 320' x 2 with RW and supervision with no LOB      AM-PAC 6 CLICK MOBILITY  Turning over in bed (including adjusting bedclothes, sheets and blankets)?: 4  Sitting down on and standing up from a chair with arms (e.g., wheelchair, bedside commode,  etc.): 4  Moving from lying on back to sitting on the side of the bed?: 4  Moving to and from a bed to a chair (including a wheelchair)?: 4  Need to walk in hospital room?: 4  Climbing 3-5 steps with a railing?: 4  Basic Mobility Total Score: 24       Treatment & Education:  Ambulation - 640' total   Nu step - 8 minutes   Stairs - 10 steps   Standing hamstring curls - 20 reps   Long arc quads - 20 reps    Patient left up in chair with call button in reach..    GOALS:   Multidisciplinary Problems       Physical Therapy Goals          Problem: Physical Therapy    Goal Priority Disciplines Outcome Goal Variances Interventions   Physical Therapy Goal     PT, PT/OT Ongoing, Progressing     Description: Short Term Goals  Ambulate SBA - 200 feet with rolling walker assistive device.   Supine to sit stand-by  Sit to stand stand-by  SPT stand-by  0-100 knee flexion right le to allow normal sit to stand  5. Independent with HEP    Long Term Goals  Ambulate SBA - 400 feet with rolling walker assistive device.   Supine to sit independent without device  Sit to stand independent without device  SPT independent without device  Needed equipment for home.                              Time Tracking:     PT Received On: 07/11/23  PT Start Time: 1345     PT Stop Time: 1426  PT Total Time (min): 41 min     Billable Minutes: Gait Training 15 and Therapeutic Exercise 26    Treatment Type: Treatment  PT/PTA: PTA     Number of PTA visits since last PT visit: 3   DAISY Geller  07/11/2023

## 2023-07-11 NOTE — PT/OT/SLP PROGRESS
Physical Therapy Treatment    Patient Name:  Sadie Martinez   MRN:  [id number]    Recommendations:     Discharge Recommendations: home health PT, home health OT  Discharge Equipment Recommendations: none  Barriers to discharge: Decreased caregiver support    Assessment:     Sadie Martinez is a 58 y.o. female admitted with a medical diagnosis of S/P total knee replacement, right.  She presents with the following impairments/functional limitations: weakness, impaired endurance, impaired functional mobility, decreased lower extremity function, pain, decreased ROM Patient was willing to participate with therapy this morning. Patient reports to be having 4/10 pain this morning.     Rehab Prognosis: Good; patient would benefit from acute skilled PT services to address these deficits and reach maximum level of function.    Recent Surgery: * No surgery found *      Plan:     During this hospitalization, patient to be seen BID (5 x/week) to address the identified rehab impairments via gait training, therapeutic activities, therapeutic exercises, neuromuscular re-education and progress toward the following goals:    Plan of Care Expires:  08/03/23    Subjective     Chief Complaint: weakness  Patient/Family Comments/goals: return home  Pain/Comfort:  Pain Rating 1: 4/10  Location - Side 1: Right  Location 1: knee  Pain Rating Post-Intervention 1: 4/10      Objective:     Communicated with PT prior to session.  Patient found up in chair with   upon PT entry to room.     General Precautions: Standard, fall  Orthopedic Precautions: RLE weight bearing as tolerated  Braces: Knee immobilizer  Respiratory Status: Room air     Functional Mobility:  Transfers:     Sit to Stand:  modified independence with rolling walker  Gait: Patient ambulated 320' x 2 with RW and supervision with no LOB      AM-PAC 6 CLICK MOBILITY  Turning over in bed (including adjusting bedclothes, sheets and blankets)?: 4  Sitting down on and standing up from a  chair with arms (e.g., wheelchair, bedside commode, etc.): 4  Moving from lying on back to sitting on the side of the bed?: 4  Moving to and from a bed to a chair (including a wheelchair)?: 4  Need to walk in hospital room?: 4  Climbing 3-5 steps with a railing?: 4  Basic Mobility Total Score: 24       Treatment & Education:  Ambulation - 640' total   Nu step - 6 minutes   Chair squats - 20 reps   Standing hip flexion (right leg) - 20 reps   Standing hip abduction (right leg) - 20 reps   Hip adduction - 20 reps     Patient left up in chair with call button in reach and OT present..    GOALS:   Multidisciplinary Problems       Physical Therapy Goals          Problem: Physical Therapy    Goal Priority Disciplines Outcome Goal Variances Interventions   Physical Therapy Goal     PT, PT/OT Ongoing, Progressing     Description: Short Term Goals  Ambulate SBA - 200 feet with rolling walker assistive device.   Supine to sit stand-by  Sit to stand stand-by  SPT stand-by  0-100 knee flexion right le to allow normal sit to stand  5. Independent with HEP    Long Term Goals  Ambulate SBA - 400 feet with rolling walker assistive device.   Supine to sit independent without device  Sit to stand independent without device  SPT independent without device  Needed equipment for home.                              Time Tracking:     PT Received On: 07/11/23  PT Start Time: 1008     PT Stop Time: 1048  PT Total Time (min): 40 min     Billable Minutes: Gait Training 15 and Therapeutic Exercise 25    Treatment Type: Treatment  PT/PTA: PTA     Number of PTA visits since last PT visit: 2   DAISY Geller  07/11/2023

## 2023-07-11 NOTE — PLAN OF CARE
Problem: Adult Inpatient Plan of Care  Goal: Optimal Comfort and Wellbeing  Outcome: Ongoing, Progressing     Problem: Diabetes Comorbidity  Goal: Blood Glucose Level Within Targeted Range  Outcome: Ongoing, Progressing     Problem: Pain Acute  Goal: Acceptable Pain Control and Functional Ability  Outcome: Ongoing, Progressing

## 2023-07-11 NOTE — PT/OT/SLP PROGRESS
Occupational Therapy   Treatment    Name: Sadie Martinez  MRN: [id number]  Admitting Diagnosis:  S/P total knee replacement, right       Recommendations:     Discharge Recommendations: home health PT, home health OT  Discharge Equipment Recommendations:  to be determined by next level of care  Barriers to discharge:       Assessment:     Sadie Martinez is a 58 y.o. female with a medical diagnosis of S/P total knee replacement, right.  She presents with weakness. Performance deficits affecting function are weakness, impaired endurance, impaired self care skills, impaired functional mobility, gait instability, impaired balance.     Rehab Prognosis:  Good; patient would benefit from acute skilled OT services to address these deficits and reach maximum level of function.       Plan:     Patient to be seen 5 x/week to address the above listed problems via self-care/home management, therapeutic activities, therapeutic exercises, neuromuscular re-education  Plan of Care Expires:    Plan of Care Reviewed with: patient    Subjective     Chief Complaint: Pt had no complaints, stated that she is ready to go home tomorrow  Patient/Family Comments/goals: return home  Pain/Comfort:       Objective:     Communicated with: Pt prior to session.  Patient found up in chair with   upon OT entry to room.    General Precautions: Standard, fall    Orthopedic Precautions:RLE weight bearing as tolerated  Braces: Knee immobilizer  Respiratory Status: Room air     Occupational Performance:     Bed Mobility:         Functional Mobility/Transfers:  Patient completed Sit <> Stand Transfer with supervision  with  rolling walker   Functional Mobility: Pt ambulated 320 ft using RW as AD and with supervision for safety    Activities of Daily Living:  Lower Body Dressing: supervision Pt used reacher and sock aid to don/doff R sock.      [facility] 6 Click ADL:      Treatment & Education:  Pt completed 5 min on UBE with no rest break. Pt stood using RW as AD  and used reacher to retrieve items from floor requiring Pt to take steps toward items. Pt folded laundry in standing to simulate household chores. Pt had no LOB and no breaks during There Act.    Patient left up in chair with call button in reach    GOALS:   Multidisciplinary Problems       Occupational Therapy Goals          Problem: Occupational Therapy    Goal Priority Disciplines Outcome Interventions   Occupational Therapy Goal     OT, PT/OT Ongoing, Progressing    Description: ST week    Pt will bathe self with SBA  Pt will perform LB dressing with SBA\  Pt will transfer toilet/BSC with mod I  Pt will perform standing task x 8 min with standby assistance      LT weeks  1. Pt will achieve max level of independence with self care.                         Time Tracking:     OT Date of Treatment: 23  OT Start Time: 1050  OT Stop Time: 1113  OT Total Time (min): 23 min    Billable Minutes:Self Care/Home Management 10  Therapeutic Activity 13               2023

## 2023-07-11 NOTE — PLAN OF CARE
Problem: Fall Injury Risk  Goal: Absence of Fall and Fall-Related Injury  Outcome: Ongoing, Progressing  Intervention: Promote Injury-Free Environment  Flowsheets (Taken 7/11/2023 4197)  Safety Promotion/Fall Prevention:   assistive device/personal item within reach   in recliner, wheels locked   nonskid shoes/socks when out of bed   side rails raised x 3   instructed to call staff for mobility

## 2023-07-12 VITALS
DIASTOLIC BLOOD PRESSURE: 67 MMHG | RESPIRATION RATE: 18 BRPM | WEIGHT: 164 LBS | OXYGEN SATURATION: 98 % | TEMPERATURE: 99 F | HEART RATE: 84 BPM | SYSTOLIC BLOOD PRESSURE: 120 MMHG | HEIGHT: 62 IN | BODY MASS INDEX: 30.18 KG/M2

## 2023-07-12 LAB
GLUCOSE SERPL-MCNC: 205 MG/DL (ref 70–105)
GLUCOSE SERPL-MCNC: 222 MG/DL (ref 70–105)

## 2023-07-12 PROCEDURE — 82962 GLUCOSE BLOOD TEST: CPT

## 2023-07-12 PROCEDURE — 97110 THERAPEUTIC EXERCISES: CPT | Mod: CQ

## 2023-07-12 PROCEDURE — 25000003 PHARM REV CODE 250: Performed by: NURSE PRACTITIONER

## 2023-07-12 PROCEDURE — 27000944

## 2023-07-12 PROCEDURE — 97116 GAIT TRAINING THERAPY: CPT | Mod: CQ

## 2023-07-12 PROCEDURE — 99315 NF DSCHRG MGMT 30 MIN/LESS: CPT | Mod: ,,, | Performed by: FAMILY MEDICINE

## 2023-07-12 PROCEDURE — 99315 PR NURSING FAC DISCHRGE DAY,1-30 MIN: ICD-10-PCS | Mod: ,,, | Performed by: FAMILY MEDICINE

## 2023-07-12 PROCEDURE — 27000982 HC MATTRESS, MATRIX LAL RENTAL

## 2023-07-12 PROCEDURE — 63600175 PHARM REV CODE 636 W HCPCS: Performed by: NURSE PRACTITIONER

## 2023-07-12 RX ORDER — ACETAMINOPHEN 325 MG/1
650 TABLET ORAL EVERY 4 HOURS PRN
Refills: 0
Start: 2023-07-12

## 2023-07-12 RX ORDER — AMOXICILLIN 250 MG
1 CAPSULE ORAL DAILY
Qty: 30 TABLET | Refills: 0 | Status: SHIPPED | OUTPATIENT
Start: 2023-07-12 | End: 2023-08-11

## 2023-07-12 RX ORDER — CYCLOBENZAPRINE HCL 10 MG
TABLET ORAL
Qty: 10 TABLET | Refills: 0 | Status: SHIPPED | OUTPATIENT
Start: 2023-07-12

## 2023-07-12 RX ORDER — CALCIUM CARBONATE 600 MG
600 TABLET ORAL DAILY
Qty: 30 TABLET | Refills: 0 | Status: SHIPPED | OUTPATIENT
Start: 2023-07-12 | End: 2023-08-11

## 2023-07-12 RX ADMIN — HYDROCODONE BITARTRATE AND ACETAMINOPHEN 1 TABLET: 7.5; 325 TABLET ORAL at 05:07

## 2023-07-12 RX ADMIN — ASPIRIN 81 MG: 81 TABLET, COATED ORAL at 09:07

## 2023-07-12 RX ADMIN — PANTOPRAZOLE SODIUM 40 MG: 40 TABLET, DELAYED RELEASE ORAL at 09:07

## 2023-07-12 RX ADMIN — HYDROCODONE BITARTRATE AND ACETAMINOPHEN 1 TABLET: 7.5; 325 TABLET ORAL at 11:07

## 2023-07-12 RX ADMIN — HYDROCHLOROTHIAZIDE 25 MG: 12.5 TABLET ORAL at 09:07

## 2023-07-12 RX ADMIN — ENOXAPARIN SODIUM 60 MG: 60 INJECTION SUBCUTANEOUS at 04:07

## 2023-07-12 RX ADMIN — SENNOSIDES 8.6 MG: 8.6 TABLET, FILM COATED ORAL at 09:07

## 2023-07-12 RX ADMIN — AMLODIPINE BESYLATE 5 MG: 5 TABLET ORAL at 09:07

## 2023-07-12 NOTE — PLAN OF CARE
[facility] - Medical Surgical Unit - Swing Bed   Interdisciplinary Team Meeting    Patient: Sadie Martinez   Today's Date: 7/12/2023   Estimated D/C Date: 7/12/2023        Physician: Mani Higginbotham MD Nurse Practitioner: Belinda Carvajal NP   Pharmacy: Mallory Branstetter, PharmD Unit Director: JULIAN Petersen   :  Sharita Sin RN Physical/Occupational Therapy:  Petey Mistry PT   Speech Therapy: ALESSANDRO Activity Therapy: Radha Carver LPN   Nursing: JULIAN Petersen  Respiratory: NA Dietary: Castro Melchor RD  Other:      Nursing  New Symptoms/Problems: N/A  Last Bowel Movement: 07/09/23  Urine: continent Diarrhea: No   Constipated: No Bowel: continent Gonzalez: No   Isolation: No     Device (Oxygen Therapy): room air    SpO2: 99 %    Diet/Nutrition Received: consistent carb/diabetic diet  Speech/Swallowing: No current speech or swallowing issues  Aspiration Precautions: No  Cognition: WNL    Physical Therapy  Physical Therapy/Gait: 320' x 2 w/RW w/supervision ELOS: Plan to DC 7/12/2023    Transfers: Modified Independent Range of Motion/Restrictions: WBAT RLE     Occupational Therapy  Eating/Grooming: Independent Toileting: Modified Independent   Bathing: Modified Independent Dressing (Upper Body): Independent   Dressing (Lower Body): Modified Independent      Activity Therapy  Level of participation: Active participation      Tx Plan/Recommendations reviewed with family and/or patient on (date) 07/10/2023.  Additional family Conference/Training: in home safety provided  D/C Plan/Recommendations: Home with HH  GERRI: 7/12/2023    Pharmacy  Medication Changes (see MD orders in chart): Yes  MD/NP: Mani Higginbotham MD Labs Reviewed: Yes New Lab Orders: Yes   Lab Concerns: Q M/Th

## 2023-07-12 NOTE — DISCHARGE SUMMARY
[facility] - Medical Surgical Unit  Hospital Medicine  Discharge Summary      Patient Name: Sadie Martinez  MRN: [id number]  MISTY: [id number]  Patient Class: IP- Swing  Admission Date: 6/30/2023  Hospital Length of Stay: 12 days  Discharge Date and Time:  07/12/2023 7:14 AM  Attending Physician: Mani Higginbotham IV, DO   Discharging Provider: WENDY DENNY  Primary Care Provider: Indra Matos MD    Primary Care Team: Networked reference to record PCT     HPI:   Chief complaint:  Right knee pain.     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  Patient states she injured her right knee at work in 2017.  States has had discomfort with it since.  More than a year ago she had a meniscus tear in right knee requiring a knee scope.  Has had continued pain leading to her knee surgery.  Denies any significant pain to her left knee.      Review of system:  See above.  Wears glasses without recent change in vision.  States also has some chronic back issues which she states initiated at her work.  Denies any snoring and states sleep issues seemed to come from her back and chronic knee pain.  Review of systems otherwise.     Past medical history:  -hypertension greater than 15 years.  -diabetes mellitus type 2 treated with oral agents  -diagnosed 2020 with breast cancer status post left mastectomy.  No chemotherapy or radiation needed.  Followed by Dr. Warner.     Past surgical history:  Right knee scope  Right total knee replacement this admission  Previous left mastectomy in 2020  Hysterectomy with bilateral salpingo-oophorectomy  Low back surgery     Patient list Mobic as an allergy     Social history:  Lives by herself  Has children that live in the area that could possibly help her as needed  Would like to go to swing bed following this admission  No history of tobacco use  Drink alcohol only occasionally     Family  history:  Mother had heart problems with heart attack starting her 60s     Health maintenance issues:  Primary care provider is Melanie Watts NP  Had flu shot last fall  No prior Pneumovax but recommended she get wanted age 63  Had mammogram in April  Last Pap smear 3 years earlier  Scheduled for colonoscopy next year, states prior colonoscopy showed an area they were concerned about infection and they found no polyps but told her to repeat it next year              Overview/Hospital Course:  06/26 Not much pain. Ortho note seen with need to return to surgery with abnormal position prosthesis. Discussed with Dr Warner and will stop her Arimidex for two weeks to lower risk of DVT. Discussed with pt and she understands. BP and BS Ok. Correct electrolytes.   06/27 Seen prior to surgery in good spirits. Seems medically stable to go. Plan rehab at [place]. BP and BS good.      * No surgery found *      Hospital Course:   07/05/23 Admitted to swing bed on 06/30/23 s/p right TKR. She has ambulated 140 feet with rolling walker. Potassium is 3.2 this morning. Will give replacement po.    07/06/23 Awake and resting in bed . Ace wrap to right lower leg. Working with therapy. Ambulated 105 feet with rolling walker. States she stills feels unsteady. She lives alone and will be returning to her home alone. Continue therapy. Requests dulcolax.      07/10/23 Awake and sitting up on the side of the bed. Dressing intact to right knee. Staples intact. Incision has healed well. Will dc staples and steri strip area. Potassium is low at 3.1. Will replace orally. Follow up appt with Dr. Indra Matos on 07/14/23.  Reckenrick SHIRLEY. Talked with her this. Will dc home on Wednesday.    7/12/23 Spoke with patient about discharge today. Med Rec completed. Patient will continue PT and OT with [facility] and follow up with Indra Matos this Friday as scheduled. Also discussed following up with her PCP Melanie Watts in one week.        Goals of Care  Treatment Preferences:  Code Status: Full Code      Consults:     Cardiac/Vascular  Systolic hypertension  116/70 monitor and titrate PRN      Endocrine  Type 2 diabetes mellitus without complication, without long-term current use of insulin    Lab Results   Component Value Date    HGBA1C 6.2 06/27/2023        Antihyperglycemics (From admission, onward)      Start     Stop Route Frequency Ordered    07/03/23 0952  insulin aspart U-100 injection 1-10 Units         -- SubQ Before meals & nightly PRN 07/03/23 0852          Hold Oral hypoglycemics while patient is in the hospital.    07/05/23 dcd glipizide , on moderate SSI coverage    07/06/23 continue to monitor on SSI coverage     07/10/23 stable    7/12/23 AM glucose 205. Patient will resume her Metformin and Glipizide this afternoon at home and follow with PCP in one week.    Orthopedic  * S/P total knee replacement, right  PT/OT eval and treat        07/06/23 walked 105 feet with therapy yesterday, states she still feels unsteady    07/10/23 dc staples and steri strip     7/12/23 Staples have been removed and dressing remains dry and intact. Will follow with Indra Rush this Friday    Localized osteoarthritis of right knee        Other  Generalized weakness    07/05/23 Continue therapy for strengthening    07/06/23 continue therapy     07/10/23 FERN [place]. Last covered day is Wednesday- talked with her re this and she states she will be ready to dc Wednesday.     7/12/23 Discussed discharge with patient. She will continue therapy with [facility] after discharge from the hospital.       Final Active Diagnoses:    Diagnosis Date Noted POA    PRINCIPAL PROBLEM:  S/P total knee replacement, right [Z96.651] 06/30/2023 Not Applicable    Generalized weakness [R53.1] 06/30/2023 Yes    Systolic hypertension [I10] 06/26/2023 Yes    Localized osteoarthritis of right knee [M17.11] 06/26/2023 Yes    Type 2 diabetes mellitus without complication, without long-term  current use of insulin [E11.9] 06/26/2023 Yes    History of left breast cancer [Z85.3] 06/26/2023 Not Applicable      Problems Resolved During this Admission:       Discharged Condition: good    Disposition: Home-Health Care [facility]    Follow Up:   Follow-up Information       Indra Matos MD Follow up in 2 day(s).    Specialty: Orthopedic Surgery  Why: confirm appt time  Contact information:  [address] [facility] [place] [address]  [phone]               Melanie Watts Follow up in 1 week(s).    Specialty: Family Medicine  Contact information:  [address]  [place] [address] [phone]                           Patient Instructions:      Diet diabetic       Significant Diagnostic Studies: Labs: All labs within the past 24 hours have been reviewed    Pending Diagnostic Studies:       None           Medications:  Reconciled Home Medications:      Medication List        START taking these medications      acetaminophen 325 MG tablet  Commonly known as: TYLENOL  Take 2 tablets (650 mg total) by mouth every 4 (four) hours as needed.            CHANGE how you take these medications      ondansetron 4 MG Tbdl  Commonly known as: ZOFRAN-ODT  Take 1 tablet (4 mg total) by mouth every 6 (six) hours as needed (nausea).  What changed: Another medication with the same name was removed. Continue taking this medication, and follow the directions you see here.     senna-docusate 8.6-50 mg 8.6-50 mg per tablet  Commonly known as: PERICOLACE  Take 1 tablet by mouth once daily.  What changed: when to take this            CONTINUE taking these medications      amLODIPine 5 MG tablet  Commonly known as: NORVASC  Take 5 mg by mouth once daily.     anastrozole 1 mg Tab  Commonly known as: ARIMIDEX  Take 1 mg by mouth once daily.     aspirin 81 MG EC tablet  Commonly known as: ECOTRIN  Take 1 tablet (81 mg total) by mouth 2 (two) times a day.     calcium carbonate 600 mg calcium (1,500 mg) Tab  Commonly known as: OS-WILLIAM  Take  1 tablet (600 mg total) by mouth once daily.     cyclobenzaprine 10 MG tablet  Commonly known as: FLEXERIL  TAKE 1 TABLET BY MOUTH AT BEDTIME AS NEEDED FOR MUSCLE PAIN     gabapentin 300 MG capsule  Commonly known as: NEURONTIN  Take 300 mg by mouth 3 (three) times daily.     glipiZIDE 10 MG tablet  Commonly known as: GLUCOTROL  TAKE 1 TABLET BY MOUTH EVERY DAY BEFORE A MEAL     hydroCHLOROthiazide 25 MG tablet  Commonly known as: HYDRODIURIL  Take 25 mg by mouth once daily.     metFORMIN 1000 MG tablet  Commonly known as: GLUCOPHAGE  Take 1,000 mg by mouth 2 (two) times daily with meals.     oxyCODONE-acetaminophen  mg per tablet  Commonly known as: PERCOCET  Take 1 tablet by mouth every 6 (six) hours as needed for Pain.     rosuvastatin 10 MG tablet  Commonly known as: CRESTOR  Take 10 mg by mouth once daily.              Indwelling Lines/Drains at time of discharge:   Lines/Drains/Airways       None                   Time spent on the discharge of patient: 23 minutes         Mani Higginbotham IV, DO  Department of Hospital Medicine  [facility] Surgical [facility]

## 2023-07-12 NOTE — ASSESSMENT & PLAN NOTE
Lab Results   Component Value Date    HGBA1C 6.2 06/27/2023        Antihyperglycemics (From admission, onward)    Start     Stop Route Frequency Ordered    07/03/23 0952  insulin aspart U-100 injection 1-10 Units         -- SubQ Before meals & nightly PRN 07/03/23 0852        Hold Oral hypoglycemics while patient is in the hospital.    07/05/23 dcd glipizide , on moderate SSI coverage    07/06/23 continue to monitor on SSI coverage     07/10/23 stable    7/12/23 AM glucose 205. Patient will resume her Metformin and Glipizide this afternoon at home and follow with PCP in one week.

## 2023-07-12 NOTE — PT/OT/SLP PROGRESS
Physical Therapy Treatment    Patient Name:  Sadie Martinez   MRN:  [id number]    Recommendations:     Discharge Recommendations: home health PT, home health OT  Discharge Equipment Recommendations: none  Barriers to discharge: Decreased caregiver support    Assessment:     Sadie Martinez is a 58 y.o. female admitted with a medical diagnosis of S/P total knee replacement, right.  She presents with the following impairments/functional limitations: weakness, impaired endurance, impaired functional mobility, decreased lower extremity function, pain, decreased ROM Patient was willing to participate with therapy this morning and is excited to be returning home this afternoon.     Rehab Prognosis: Good; patient would benefit from acute skilled PT services to address these deficits and reach maximum level of function.    Recent Surgery: * No surgery found *      Plan:     During this hospitalization, patient to be seen BID (5 x/week) to address the identified rehab impairments via gait training, therapeutic exercises, neuromuscular re-education, therapeutic activities and progress toward the following goals:    Plan of Care Expires:  08/03/23    Subjective     Chief Complaint: weakness  Patient/Family Comments/goals: return home  Pain/Comfort:         Objective:     Communicated with PT prior to session.  Patient found up in chair with   upon PT entry to room.     General Precautions: Standard, fall  Orthopedic Precautions: RLE weight bearing as tolerated  Braces: Knee immobilizer  Respiratory Status: Room air     Functional Mobility:  Transfers:     Sit to Stand:  modified independence with rolling walker  Gait: Patient ambulated 320' x 2 with RW and supervision with no LOB      AM-PAC 6 CLICK MOBILITY  Turning over in bed (including adjusting bedclothes, sheets and blankets)?: 4  Sitting down on and standing up from a chair with arms (e.g., wheelchair, bedside commode, etc.): 4  Moving from lying on back to sitting on the  side of the bed?: 4  Moving to and from a bed to a chair (including a wheelchair)?: 4  Need to walk in hospital room?: 4  Climbing 3-5 steps with a railing?: 4  Basic Mobility Total Score: 24       Treatment & Education:  Ambulation - 640' total   Nu step - 8 minutes   Chair squats - 20 reps   Standing hip flexion (right leg) - 20 reps   Long arc quads - 20 reps     Patient left up in chair with call button in reach..    GOALS:   Multidisciplinary Problems       Physical Therapy Goals          Problem: Physical Therapy    Goal Priority Disciplines Outcome Goal Variances Interventions   Physical Therapy Goal     PT, PT/OT Ongoing, Progressing     Description: Short Term Goals  Ambulate SBA - 200 feet with rolling walker assistive device.   Supine to sit stand-by  Sit to stand stand-by  SPT stand-by  0-100 knee flexion right le to allow normal sit to stand  5. Independent with HEP    Long Term Goals  Ambulate SBA - 400 feet with rolling walker assistive device.   Supine to sit independent without device  Sit to stand independent without device  SPT independent without device  Needed equipment for home.                              Time Tracking:     PT Received On: 07/12/23  PT Start Time: 0932     PT Stop Time: 1015  PT Total Time (min): 43 min     Billable Minutes: Gait Training 15 and Therapeutic Exercise 28    Treatment Type: Treatment  PT/PTA: PTA     Number of PTA visits since last PT visit: 4   DAISY Geller  07/12/2023

## 2023-07-12 NOTE — NURSING
Reviewed all discharge material and follow up appointment times with patient.  She verbalized understanding.  Discharge is pending when her transportation arrives to pick her up.

## 2023-07-12 NOTE — ASSESSMENT & PLAN NOTE
07/05/23 Continue therapy for strengthening    07/06/23 continue therapy     07/10/23 FERN [place]. Last covered day is Wednesday- talked with her re this and she states she will be ready to dc Wednesday.     7/12/23 Discussed discharge with patient. She will continue therapy with [facility] after discharge from the hospital.

## 2023-07-12 NOTE — ASSESSMENT & PLAN NOTE
PT/OT eval and treat        07/06/23 walked 105 feet with therapy yesterday, states she still feels unsteady    07/10/23 dc staples and steri strip     7/12/23 Staples have been removed and dressing remains dry and intact. Will follow with Indra Matos this Friday

## 2023-07-12 NOTE — PT/OT/SLP PROGRESS
Physical Therapy Treatment    Patient Name:  Sadie Martinez   MRN:  [id number]    Recommendations:     Discharge Recommendations: home health PT, home health OT  Discharge Equipment Recommendations: none  Barriers to discharge: Decreased caregiver support    Assessment:     Sadie Martinez is a 58 y.o. female admitted with a medical diagnosis of S/P total knee replacement, right.  She presents with the following impairments/functional limitations: weakness, impaired endurance, impaired functional mobility, decreased lower extremity function, pain, decreased ROM.    Rehab Prognosis: Good; patient would benefit from acute skilled PT services to address these deficits and reach maximum level of function.    Recent Surgery: * No surgery found *      Plan:     During this hospitalization, patient to be seen BID to address the identified rehab impairments via gait training, therapeutic exercises, neuromuscular re-education, therapeutic activities and progress toward the following goals:    Plan of Care Expires:  08/03/23    Subjective     Chief Complaint: no complaint reported  Patient/Family Comments/goals: return home  Pain/Comfort:  Pain Rating 1: 0/10      Objective:     Communicated with PT prior to session.  Patient found up in chair with   upon PT entry to room.     General Precautions: Standard, fall  Orthopedic Precautions: RLE weight bearing as tolerated  Braces: Knee immobilizer  Respiratory Status: Room air     Functional Mobility:  Bed Mobility:     Supine to Sit: independence  Sit to Supine: independence  Transfers:     Sit to Stand:  modified independence with rolling walker  Gait: Patient ambulated 320 feet x 2 trials with rolling walker and supervision. Patient with slow pace but good reciprocal and even steps.      AM-PAC 6 CLICK MOBILITY  Turning over in bed (including adjusting bedclothes, sheets and blankets)?: 4  Sitting down on and standing up from a chair with arms (e.g., wheelchair, bedside commode,  etc.): 4  Moving from lying on back to sitting on the side of the bed?: 4  Moving to and from a bed to a chair (including a wheelchair)?: 4  Need to walk in hospital room?: 4  Climbing 3-5 steps with a railing?: 4  Basic Mobility Total Score: 24       Treatment & Education:  Patient performed bed mobility, transfers, and gait as listed above.    NuStep: x 5 minutes  Quad sets: x 20 reps x 3 sec hold  Short arc quads: x 20 reps    Patient left up in chair with call button in reach..    GOALS:   Multidisciplinary Problems       Physical Therapy Goals          Problem: Physical Therapy    Goal Priority Disciplines Outcome Goal Variances Interventions   Physical Therapy Goal     PT, PT/OT Ongoing, Progressing     Description: Short Term Goals  Ambulate SBA - 200 feet with rolling walker assistive device.   Supine to sit stand-by  Sit to stand stand-by  SPT stand-by  0-100 knee flexion right le to allow normal sit to stand  5. Independent with HEP    Long Term Goals  Ambulate SBA - 400 feet with rolling walker assistive device.   Supine to sit independent without device  Sit to stand independent without device  SPT independent without device  Needed equipment for home.                              Time Tracking:     PT Received On: 07/12/23  PT Start Time: 1331     PT Stop Time: 1402  PT Total Time (min): 31 min     Billable Minutes: Gait Training 16 and Therapeutic Exercise 15    Treatment Type: Treatment  PT/PTA: PTA     Number of PTA visits since last PT visit: 5 07/12/2023

## 2023-07-12 NOTE — PLAN OF CARE
Problem: Adult Inpatient Plan of Care  Goal: Plan of Care Review  Outcome: Ongoing, Progressing  Flowsheets (Taken 7/12/2023 0546)  Plan of Care Reviewed With: patient  Goal: Patient-Specific Goal (Individualized)  Outcome: Ongoing, Progressing  Goal: Absence of Hospital-Acquired Illness or Injury  Outcome: Ongoing, Progressing  Intervention: Identify and Manage Fall Risk  Flowsheets (Taken 7/12/2023 0546)  Safety Promotion/Fall Prevention:   assistive device/personal item within reach   diversional activities provided   Fall Risk reviewed with patient/family   lighting adjusted   medications reviewed   nonskid shoes/socks when out of bed   side rails raised x 2   instructed to call staff for mobility  Intervention: Prevent Skin Injury  Flowsheets (Taken 7/12/2023 0546)  Body Position: position changed independently  Skin Protection: incontinence pads utilized  Intervention: Prevent and Manage VTE (Venous Thromboembolism) Risk  Flowsheets (Taken 7/12/2023 0546)  Activity Management:   Ambulated to bathroom - L4   Arm raise - L1   Sitting at edge of bed - L2   Standing - L3   Step forward and back - L3   Walk with assistive devise and /or staff member - L3  VTE Prevention/Management:   ambulation promoted   fluids promoted  Range of Motion: active ROM (range of motion) encouraged  Intervention: Prevent Infection  Flowsheets (Taken 7/12/2023 0546)  Infection Prevention:   equipment surfaces disinfected   hand hygiene promoted   personal protective equipment utilized  Goal: Optimal Comfort and Wellbeing  Outcome: Ongoing, Progressing  Intervention: Monitor Pain and Promote Comfort  Flowsheets (Taken 7/12/2023 0546)  Pain Management Interventions: medication offered  Intervention: Provide Person-Centered Care  Flowsheets (Taken 7/12/2023 0546)  Trust Relationship/Rapport: care explained  Goal: Readiness for Transition of Care  Outcome: Ongoing, Progressing     Problem: Diabetes Comorbidity  Goal: Blood Glucose Level  Within Targeted Range  Outcome: Ongoing, Progressing  Intervention: Monitor and Manage Glycemia  Flowsheets (Taken 7/12/2023 0546)  Glycemic Management: blood glucose monitored     Problem: Pain Acute  Goal: Acceptable Pain Control and Functional Ability  Outcome: Ongoing, Progressing  Intervention: Develop Pain Management Plan  Flowsheets (Taken 7/12/2023 0546)  Pain Management Interventions: medication offered  Intervention: Prevent or Manage Pain  Flowsheets (Taken 7/12/2023 0546)  Bowel Elimination Promotion: adequate fluid intake promoted  Medication Review/Management: medications reviewed     Problem: Fall Injury Risk  Goal: Absence of Fall and Fall-Related Injury  Outcome: Ongoing, Progressing  Intervention: Identify and Manage Contributors  Flowsheets (Taken 7/12/2023 0546)  Medication Review/Management: medications reviewed  Intervention: Promote Injury-Free Environment  Flowsheets (Taken 7/12/2023 0546)  Safety Promotion/Fall Prevention:   assistive device/personal item within reach   diversional activities provided   Fall Risk reviewed with patient/family   lighting adjusted   medications reviewed   nonskid shoes/socks when out of bed   side rails raised x 2   instructed to call staff for mobility

## 2023-07-12 NOTE — PLAN OF CARE
[facility]  Discharge Final Note    Primary Care Provider: Indra Matos MD    Expected Discharge Date: 7/12/2023    Final Discharge Note (most recent)       Final Note - 07/12/23 0907          Final Note    Assessment Type Final Discharge Note (P)      Anticipated Discharge Disposition Home-Health Care Svc (P)      What phone number can be called within the next 1-3 days to see how you are doing after discharge? [phone] (P)      Hospital Resources/Appts/Education Provided Provided patient/caregiver with written discharge plan information (P)         Post-Acute Status    Post-Acute Authorization Home Health (P)      Post-Acute Placement Status Patient List Provided (P)      Home Health Status Referrals Sent (P)      Patient choice form signed by patient/caregiver List with quality metrics by geographic area provided (P)      Discharge Delays None known at this time (P)                      Important Message from Medicare  Important Message from Medicare regarding Discharge Appeal Rights: Given to patient/caregiver, Explained to patient/caregiver, Signed/date by patient/caregiver     Date IMM was signed: 07/10/23  Time IMM was signed: 1325    Contact Info       Indra Matos MD   Specialty: Orthopedic Surgery   Relationship: PCP - General    [address] [place] [id number]   Phone: [phone]       Next Steps: Follow up in 2 day(s)    Instructions: confirm appt time    Melanie Watts   Specialty: Family Medicine    [facility] [place]  [address]  [place] [address]   Phone: [phone]       Next Steps: Follow up in 1 week(s)        Ms. Martinez will discharge home today with [facility] to follow.  Referrals sent.  She already has a walker and cane at home,

## 2023-07-13 ENCOUNTER — PATIENT OUTREACH (OUTPATIENT)
Dept: ADMINISTRATIVE | Facility: CLINIC | Age: 58
End: 2023-07-13

## 2023-07-13 PROCEDURE — G0180 MD CERTIFICATION HHA PATIENT: HCPCS | Mod: ,,, | Performed by: ORTHOPAEDIC SURGERY

## 2023-07-13 PROCEDURE — G0180 PR HOME HEALTH MD CERTIFICATION: ICD-10-PCS | Mod: ,,, | Performed by: ORTHOPAEDIC SURGERY

## 2023-07-13 NOTE — PROGRESS NOTES
C3 nurse attempted to contact Sadie Martinez  for a TCC post hospital discharge follow up call. No answer. Spoke with daughter Kavya Martinez, left message with call back information. The patient does not have a scheduled HOSFU appointment, and the pt does not have an [facility] PCP.

## 2023-07-13 NOTE — PROGRESS NOTES
C3 nurse spoke with Sadie Martinez  for a TCC post hospital discharge follow up call. The patient reports does not have a scheduled HOSFU appointment. C3 nurse was unable to schedule [facility]. Patient advised to contact their PCP to schedule a HOSPFU within 5-7 days.         normal...

## 2023-07-14 ENCOUNTER — TELEPHONE (OUTPATIENT)
Dept: MEDSURG UNIT | Facility: HOSPITAL | Age: 58
End: 2023-07-14
Payer: MEDICARE

## 2023-07-14 ENCOUNTER — OFFICE VISIT (OUTPATIENT)
Dept: ORTHOPEDICS | Facility: CLINIC | Age: 58
End: 2023-07-14
Payer: MEDICARE

## 2023-07-14 DIAGNOSIS — Z96.651 STATUS POST TOTAL RIGHT KNEE REPLACEMENT: Primary | ICD-10-CM

## 2023-07-14 PROCEDURE — 3044F HG A1C LEVEL LT 7.0%: CPT | Mod: CPTII,,, | Performed by: NURSE PRACTITIONER

## 2023-07-14 PROCEDURE — 3044F PR MOST RECENT HEMOGLOBIN A1C LEVEL <7.0%: ICD-10-PCS | Mod: CPTII,,, | Performed by: NURSE PRACTITIONER

## 2023-07-14 PROCEDURE — 99212 OFFICE O/P EST SF 10 MIN: CPT | Mod: PBBFAC | Performed by: NURSE PRACTITIONER

## 2023-07-14 PROCEDURE — 99024 POSTOP FOLLOW-UP VISIT: CPT | Mod: ,,, | Performed by: NURSE PRACTITIONER

## 2023-07-14 PROCEDURE — 99024 PR POST-OP FOLLOW-UP VISIT: ICD-10-PCS | Mod: ,,, | Performed by: NURSE PRACTITIONER

## 2023-07-14 NOTE — PROGRESS NOTES
HISTORY OF PRESENT ILLNESS:       No surgery found No surgery found      Pt is here today for First post-operative followup of her knee arthroscopy.  she is doing well.  We have reviewed her findings and discussed plan of care and future treatment options, including the physical therapy plan.      Pt is 2 1/2 weeks post surgery. Staples was removed on Monday, incision looks good, no drainage or redness noted.  She is doing home health therapy three days a week.                                                                               PHYSICAL EXAMINATION:     Incision sites healed well  No evidence of any erythema, infection or induration  Range of motion 0-120 degrees  Minimal effusion  2+ DP pulse  No swelling, no calf tenderness  - Mayur's sign  Negative medial joint line tendernes  Moderate quad atrophy                                                                                 ASSESSMENT:                                                                                                                                               1. Status post above, doing well.                                                                                                                               PLAN:                                                                                                                                                     1. Continue with PT  2. Emphasized quad function.  3. I have discussed return to activity in detail.  4. she will see us back in 4 weeks.                                      5. All questions were answered and she should contact us if she  has any questions or concerns in the interim.              There are no Patient Instructions on file for this visit.

## 2023-07-14 NOTE — TELEPHONE ENCOUNTER
Spoke with Ms. Martinez she says therapy is going good, I had a follow up appointment this morning and everything is doing good.

## 2023-07-21 ENCOUNTER — EXTERNAL HOME HEALTH (OUTPATIENT)
Dept: HOME HEALTH SERVICES | Facility: HOSPITAL | Age: 58
End: 2023-07-21
Payer: MEDICARE

## 2023-07-31 DIAGNOSIS — Z96.651 STATUS POST TOTAL RIGHT KNEE REPLACEMENT: Primary | ICD-10-CM

## 2023-08-07 ENCOUNTER — CLINICAL SUPPORT (OUTPATIENT)
Dept: REHABILITATION | Facility: HOSPITAL | Age: 58
End: 2023-08-07
Payer: MEDICARE

## 2023-08-07 DIAGNOSIS — R26.9 ABNORMAL GAIT: ICD-10-CM

## 2023-08-07 DIAGNOSIS — M25.661 DECREASED RANGE OF MOTION (ROM) OF RIGHT KNEE: ICD-10-CM

## 2023-08-07 DIAGNOSIS — R29.898 WEAKNESS OF RIGHT LOWER EXTREMITY: Primary | ICD-10-CM

## 2023-08-07 DIAGNOSIS — Z96.651 STATUS POST TOTAL RIGHT KNEE REPLACEMENT: ICD-10-CM

## 2023-08-07 PROCEDURE — 97161 PT EVAL LOW COMPLEX 20 MIN: CPT

## 2023-08-07 NOTE — PLAN OF CARE
[facility]  Physical Therapy Initial Evaluation    Name: Sadie Martinez  Clinic Number: [id number]    Therapy Diagnosis:   Encounter Diagnoses   Name Primary?    Status post total right knee replacement     Decreased range of motion (ROM) of right knee     Weakness of right lower extremity Yes    Abnormal gait      Physician: Indra Matos MD    Physician Orders: PT Eval and Treat  Medical Diagnosis from Referral: Z96.651 (ICD-10-CM) - Status post total right knee replacement  Evaluation Date: 8/7/2023  Authorization Period Expiration: Only initial evaluation approved by [employer]. Subsequent visits require prior authorization.   Plan of Care Expiration: 9/29/2023  Visit # / Visits authorized: 1/Only initial evaluation approved by [employer]. Subsequent visits require prior authorization.     Time In: 0900  Time Out: 0935  Total Appointment Time (timed & untimed codes): 35 minutes    Precautions: Standard, Diabetes, blood thinners, Fall, pacemaker, and cancer (breast x 4 years ago; in remission)    Subjective     Date of onset: 6/26/2023    History of current condition - Sadie reports she is status post a right total knee replacement on 6/26/2023 by Dr. Indra Matos. Patient received swingbed physical therapy services for ~10 days at [facility] following her surgery. Patient has received home health physical therapy services 3x/week since her discharge from [facility]. Patient was discharged from home health on 8/4/2023. Patient sees Dr. Indra Matos on 8/10/2023.     Falls: one in April due to right knee giving way; none since    Imaging: related imaging available to view in Epic    Prior Therapy: swingbed physical therapy services for ~10 days following her right total knee arthroplasty; home health physical therapy services 3x/week following discharge from [facility]; discharged from home health on 8/4/2023  Social History: lives alone  Steps/Stairs: 3 steps with a  unilateral handrail on the right to enter home  Occupation: disability related to back and knee  Driving: No, but was prior to onset of condition  Durable Medical Equipment: single point cane  Dominant Extremity: right  Affected Extremity: left  Prior Level of Function: independent with all functional mobility without assistive device  Current Level of Function: modified independent with all functional mobility with the use of a single point cane    Pain:  Current 0/10, worst 8/10, best 0/10   Location: right knee   Description: Aching  Aggravating Factors: Night Time  Easing Factors: relaxation, pain medication, ice, rest, and elevation    Pts goals: Patient wishes to improve her functional independence and return to baseline.     Medical History:   Past Medical History:   Diagnosis Date    Arthritis     Breast cancer     Left breast mastectomy 4/16/2020    Cancer     Diabetes mellitus, type 2     Hypertension      Surgical History:   Sadie Martinez  has a past surgical history that includes Tubal ligation; Cervical biopsy w/ loop electrode excision; Back surgery; Hysterectomy; Mastectomy, partial (Left); Arthroscopic chondroplasty of knee joint (Right, 9/8/2021); Knee arthroscopy w/ plica excision (Right, 9/8/2021); Knee arthroscopy w/ meniscectomy (Right, 9/8/2021); arthroplasty, knee, total, using computer-assisted navigation (Right, 6/26/2023); and Knee joint manipulation (Right, 6/28/2023).    Medications:   Sadie has a current medication list which includes the following prescription(s): acetaminophen, amlodipine, anastrozole, aspirin, calcium carbonate, cyclobenzaprine, gabapentin, glipizide, hydrochlorothiazide, metformin, ondansetron, oxycodone-acetaminophen, rosuvastatin, and senna-docusate 8.6-50 mg.    Allergies:   Review of patient's allergies indicates:   Allergen Reactions    Mobic [meloxicam] Swelling      Objective     Range of motion:   Right Left    Hip flexion WFL WFL   Hip abduction WFL WFL  "  Hip adduction WFL WFL   Knee extension -4* 0*   Knee flexion 85* in supine 130*   Ankle DF WFL WFL   Ankle PF WFL WFL     Manual muscle test:   Right  Left    Hip flexion  MMT strength: 4-/5  MMT strength: 4/5   Hip abduction  MMT strength: 4/5  MMT strength: 4/5   Hip adduction  MMT strength: 4/5  MMT strength: 4/5   Knee extension  MMT strength: 3-/5  MMT strength: 5/5   Knee flexion  MMT strength: 3-/5  MMT strength: 5/5   Ankle dorsiflexion  MMT strength: 5/5  MMT strength: 4+/5   Ankle plantarflexion  MMT strength: 5/5  MMT strength: 5/5     Quad la degree on right;     Incision: healed nicely; 1/2 inch area of hypertorphic scarring along the upper third of the scar  Girth Measurements (mid-patella): Right 42 cm ; Left 41 cm     Gait:  Weight bearing precautions: WBAT  Assistive device: straight cane  Ambulation distance and deviations: community distances; decreased stance time on right; incomplete right terminal knee extension during right stance phase  Stairs: up/down steps with unilateral handrail and single point cane; up steps with a step-to pattern leading with the left lower extremity; down steps with a step-to pattern leading with the right lower extremity    Limitation/Restriction for FOTO Intake Survey    Therapist reviewed FOTO scores for Sadie Martinez on 2023.   FOTO documents entered into [facility] - see Media section.    Limitation Score: 53%       Patient Education and Home Exercises     Education provided: Patient educated on the importance of completing skilled physical therapy treatment and home exercise program as prescribed to maximize functional gains.     Written Home Exercises Provided: yes. Exercises were reviewed and Sadie was able to demonstrate them prior to the end of the session. Sadie demonstrated good  understanding of the education provided.     See EMR under "Patient Instructions" for exercises provided during therapy sessions.    Assessment     Sadie is a 58 y.o. female " referred to outpatient physical therapy with a medical diagnosis of Z96.651 (ICD-10-CM) - Status post total right knee replacement. Pt presents to PT with decreased right knee range of motion, right lower extremity weakness, increased assist required with transfers/gait compared to baseline, and abnormal gait mechanics status post right total knee replacement on 6/26/2023 by Dr. Indra Matos.    Pt prognosis is Excellent.   Pt will benefit from skilled outpatient Physical Therapy to address the deficits stated above and in the chart below, provide pt/family education, and to maximize pt's level of independence.     Plan of care discussed with patient: Yes  Pt's spiritual, cultural and educational needs considered and pt agreeable to plan of care and goals as stated below:     Anticipated Barriers for therapy: compliance with home exercise program; natural course of healing    Medical Necessity is demonstrated by the following:  History  Co-morbidities and personal factors that may impact the plan of care [x] LOW: no personal factors / co-morbidities  [] MODERATE: 1-2 personal factors / co-morbidities  [] HIGH: 3+ personal factors / co-morbidities    Moderate / High Support Documentation:   Co-morbidities affecting plan of care: N/A    Personal Factors:   no deficits     Examination  Body Structures and Functions, activity limitations and participation restrictions that may impact the plan of care [] LOW: addressing 1-2 elements  [] MODERATE: 3+ elements  [x] HIGH: 4+ elements (please support below)    Moderate / High Support Documentation: range of motion, strength, transfer abilities, and gait mechanics     Clinical Presentation [x] LOW: stable  [] MODERATE: Evolving  [] HIGH: Unstable     Decision Making/ Complexity Score: low       Goals:    Short Term Goals:  Patient will demonstrate independence with home exercise program to ensure carryover of treatment.  Patient will perform sit to/from stand with standby assist  without upper extremity support to improve independence and safety with transfers.  Patient will improve right lower extremity strength to 4-/5 to improve independence and safety with bed mobility, transfers, and gait.  Patient will ambulate 150 feet without assistive device with standby assist with normal gait mechanics to improve independence and safety with gait.     Long Term Goals:  Patient will perform sit to/from stand with independence without upper extremity support to improve independence and safety with transfers.  Patient will improve right lower extremity strength to 4+/5 to improve independence and safety with bed mobility, transfers, and gait.  Patient will ambulate 300 feet without assistive device with independence with normal gait mechanics including up/down curbs and ramps to improve independence and safety with community ambulation.    Plan     Plan of care Certification: 8/7/2023 to 9/29/2023.    Outpatient Physical Therapy 2 times weekly for 6 weeks to include the following interventions: Electrical Stimulation (Russian; neuromuscular electrical stimulation; IFC; premodulated IFC), Gait Training, Manual Therapy, Moist Heat/ Ice, Neuromuscular Re-ed, Patient Education, Therapeutic Activities, and Therapeutic Exercise.     Clinical Information for [facility] Authorization     Dates of Services: 8/7/2023 to 9/29/2023  Discharge Plan: Patient will be discharged from skilled physical therapy treatment once all goals have been met, patient has plateaued, or physician/insurance requests discontinuation of care. Patient will be discharged with a home exercise program.   Type of therapy: Rehabilitative  ICD-10 Diagnosis Code(s): Z96.651 (ICD-10-CM) - Status post total right knee replacement  Which side is symptomatic? Right  Surgical: Yes  Surgical procedure:  right total knee arthroplasty  Surgery date:  6/26/2023 .  Presenting symptoms/diagnoses are unspecified in nature.  Presenting symptoms are  non-radiating in nature.   The rehabilitation is not related to a diagnosis of cancer.  The rehabilitation is not related to a diagnosis of lymphedema.  Patient's clinical presentation is:  Moderate objective and functional deficits: consistent symptoms and/or symptoms that are intensified with activity with moderate loss of range of motion, strength, or ability to perform daily tasks  CPT Codes Requested:  60297 [therapeutic exercise], 99551 [neuromuscular re-education], 27858 [gait training], 92620 [manual therapy], 43647 [therapeutic activities], 53995 [attended electrical stimulation], and 82571 [unattended electrical stimulation]      Petey Mistry, PT, DPT  8/7/2023

## 2023-08-08 DIAGNOSIS — Z96.651 STATUS POST TOTAL RIGHT KNEE REPLACEMENT: Primary | ICD-10-CM

## 2023-08-10 ENCOUNTER — HOSPITAL ENCOUNTER (OUTPATIENT)
Dept: RADIOLOGY | Facility: HOSPITAL | Age: 58
Discharge: HOME | End: 2023-08-10
Attending: ORTHOPAEDIC SURGERY
Payer: MEDICARE

## 2023-08-10 ENCOUNTER — OFFICE VISIT (OUTPATIENT)
Dept: ORTHOPEDICS | Facility: CLINIC | Age: 58
End: 2023-08-10
Payer: MEDICARE

## 2023-08-10 DIAGNOSIS — Z96.651 STATUS POST TOTAL RIGHT KNEE REPLACEMENT: Primary | ICD-10-CM

## 2023-08-10 DIAGNOSIS — Z96.651 STATUS POST TOTAL RIGHT KNEE REPLACEMENT: ICD-10-CM

## 2023-08-10 PROCEDURE — 73562 XR KNEE 3 VIEW RIGHT: ICD-10-PCS | Mod: 26,RT,, | Performed by: ORTHOPAEDIC SURGERY

## 2023-08-10 PROCEDURE — 73562 X-RAY EXAM OF KNEE 3: CPT | Mod: TC,RT

## 2023-08-10 PROCEDURE — 99212 OFFICE O/P EST SF 10 MIN: CPT | Mod: PBBFAC | Performed by: ORTHOPAEDIC SURGERY

## 2023-08-10 PROCEDURE — 73562 X-RAY EXAM OF KNEE 3: CPT | Mod: 26,RT,, | Performed by: ORTHOPAEDIC SURGERY

## 2023-08-10 PROCEDURE — 3044F PR MOST RECENT HEMOGLOBIN A1C LEVEL <7.0%: ICD-10-PCS | Mod: CPTII,,, | Performed by: ORTHOPAEDIC SURGERY

## 2023-08-10 PROCEDURE — 1159F PR MEDICATION LIST DOCUMENTED IN MEDICAL RECORD: ICD-10-PCS | Mod: CPTII,,, | Performed by: ORTHOPAEDIC SURGERY

## 2023-08-10 PROCEDURE — 1160F RVW MEDS BY RX/DR IN RCRD: CPT | Mod: CPTII,,, | Performed by: ORTHOPAEDIC SURGERY

## 2023-08-10 PROCEDURE — 99024 POSTOP FOLLOW-UP VISIT: CPT | Mod: ,,, | Performed by: ORTHOPAEDIC SURGERY

## 2023-08-10 PROCEDURE — 3044F HG A1C LEVEL LT 7.0%: CPT | Mod: CPTII,,, | Performed by: ORTHOPAEDIC SURGERY

## 2023-08-10 PROCEDURE — 1160F PR REVIEW ALL MEDS BY PRESCRIBER/CLIN PHARMACIST DOCUMENTED: ICD-10-PCS | Mod: CPTII,,, | Performed by: ORTHOPAEDIC SURGERY

## 2023-08-10 PROCEDURE — 1159F MED LIST DOCD IN RCRD: CPT | Mod: CPTII,,, | Performed by: ORTHOPAEDIC SURGERY

## 2023-08-10 PROCEDURE — 99024 PR POST-OP FOLLOW-UP VISIT: ICD-10-PCS | Mod: ,,, | Performed by: ORTHOPAEDIC SURGERY

## 2023-08-10 NOTE — PROGRESS NOTES
Post-Operative Total Knee        Manipulation, Knee - Right 6/28/2023      Pt is here today for Second post-operative followup of her Manipulation, Knee - Right.  she is doing well.  We have reviewed her findings and discussed plan of care and future treatment options, including the physical therapy plan.        Physical Exam:     The affected knee was inspected today.   The wound is healing well with no signs of erythema or warmth.  There is no drainage.  No clinical signs or symptoms of infection are present.   ROM: 0-90  -Mayur's sign.  2+ pulses are present.     X-Ray Knee 3 View Right    Result Date: 8/10/2023  See Procedure Notes for results. IMPRESSION: Please see Ortho procedure notes for report.  This procedure was auto-finalized by: Virtual Radiologist   Three views right knee were obtained today demonstrating stable appearing cementless total knee which appears to be in excellent alignment.  I see no evidence of polyethylene spin out no significant abnormality demonstrated.    Assessment and Plan  1. Status post total right knee replacement          Continue physical therapy need to work on improved flexion.  Doing reasonably well at this point.  We will continue post-operative physical therapy until the patient feels that they are independent with a home rehab program.  Return to work status/ return to activities status was discussed today in detail. All questions were answered.    The use of stockings and DVT prophylaxis was discussed.  Will follow up in additional 4-6 weeks with radiographs at that time.     There are no Patient Instructions on file for this visit.

## 2023-08-15 ENCOUNTER — CLINICAL SUPPORT (OUTPATIENT)
Dept: REHABILITATION | Facility: HOSPITAL | Age: 58
End: 2023-08-15
Attending: ORTHOPAEDIC SURGERY
Payer: MEDICARE

## 2023-08-15 DIAGNOSIS — M25.661 DECREASED RANGE OF MOTION (ROM) OF RIGHT KNEE: Primary | ICD-10-CM

## 2023-08-15 DIAGNOSIS — R26.9 ABNORMAL GAIT: ICD-10-CM

## 2023-08-15 DIAGNOSIS — Z96.651 STATUS POST TOTAL RIGHT KNEE REPLACEMENT: ICD-10-CM

## 2023-08-15 DIAGNOSIS — R29.898 WEAKNESS OF RIGHT LOWER EXTREMITY: ICD-10-CM

## 2023-08-15 PROCEDURE — 97140 MANUAL THERAPY 1/> REGIONS: CPT

## 2023-08-15 PROCEDURE — 97112 NEUROMUSCULAR REEDUCATION: CPT

## 2023-08-15 PROCEDURE — 97110 THERAPEUTIC EXERCISES: CPT

## 2023-08-15 NOTE — PROGRESS NOTES
[facility]  Physical Therapy Treatment Note    Name: Sadie Martinez  Clinic Number: [id number]    Therapy Diagnosis:   Encounter Diagnoses   Name Primary?    Decreased range of motion (ROM) of right knee Yes    Weakness of right lower extremity     Status post total right knee replacement     Abnormal gait      Physician: Indra Matos MD    Visit Date: 8/15/2023  Physician Orders: PT Eval and Treat  Medical Diagnosis from Referral: Z96.651 (ICD-10-CM) - Status post total right knee replacement  Evaluation Date: 8/7/2023  Authorization Period Expiration: Only initial evaluation [facility] by [facility]. Subsequent visits require prior authorization.   Plan of Care Expiration: 9/29/2023  Visit # / Visits authorized: 2/12  Time In: 0930  Time Out: 01030  Total Billable Time: 60 minutes    Precautions: Functional Level Prior to Evaluation: Independent with pain and mild limitations    Subjective     Pt reports: Has been inconsistent with home exercises last week. Pt is anxious to reach full potential.    She was not compliant with home exercise program.  Response to previous treatment: Completed swing bed program    Pain: 4/10  Location: right knee      Objective     Sadie received therapeutic exercises to develop strength, endurance, ROM, and flexibility for 30  minutes including  Quad sets 2 sets x 10 reps  Heel Slides 2 x 10 reps  Hamstring Sets with 90/90 maneuver and with green strap  Terminal knee extension  2 x 10  Hip Slides 1 set x 5 x 15 seconds  Seated knee flex with Red T band resistance 3 sets x 10 reps  Standing knee flexion 1 x 10 reps  Standing hip flexion 1 x 10 reps  Posterior capsular stretch 1 x 5  Static Stretch with heel supported 5 min    Sadie received the following manual therapy techniques: Soft tissue Mobilization were applied to the: Knee  for 10 minutes, including:  Soft tissue mobilization, patellar mobs    Sadie participated in neuromuscular re-education  activities to improve: Balance and Posture for 20 minutes. The following activities were included:  Step ups 2 sets x 10 reps  SCIFIT x 6 min at Level 6  Weight shifts 1 min  Don Tigney  1 min    Sadei participated in dynamic functional therapeutic activities to improve functional performance for 0 minutes   no    Sadie participated in gait training to improve functional mobility and safety for 5 minutes, including:  Gait pattern with equal stride length, initial contact with heel , equal stance time     Sadie received cold pack for R knee  x minutes to 10.     Right Knee AROM Right Knee PROM   Extension 12 degrees 9 degrees   Flexion (Supine) 89 degrees 92 degrees       Home Exercises Provided and Patient Education Provided     Education provided: Review of home Program    Written Home Exercises Provided: yes. Exercises were reviewed and Sadie was able to demonstrate them prior to the end of the session.  Sadie demonstrated good  understanding of the education provided.     See EMR under Patient Instructions for exercises provided prior visit.    Assessment       Patient expected to advance in all aspects of rehab program  Sadie isprogressing well towards her goals.   Pt prognosis is Good. Good for stated goals    Pt will continue to benefit from skilled outpatient physical therapy to address the deficits listed in the problem list box on initial evaluation, provide pt/family education, and to maximize pt's level of independence in the home and community environment.     Pt's spiritual, cultural, and educational needs considered and pt agreeable to plan of care and goals.     Anticipated barriers to physical therapy: transportation    Goals:    Short Term Goals:  Patient will demonstrate independence with home exercise program to ensure carryover of treatment.  Patient will perform sit to/from stand with standby assist without upper extremity support to improve independence and safety with transfers.  Patient will  improve right lower extremity strength to 4-/5 to improve independence and safety with bed mobility, transfers, and gait.  Patient will ambulate 150 feet without assistive device with standby assist with normal gait mechanics to improve independence and safety with gait.      Long Term Goals:  Patient will perform sit to/from stand with independence without upper extremity support to improve independence and safety with transfers.  Patient will improve right lower extremity strength to 4+/5 to improve independence and safety with bed mobility, transfers, and gait.  Patient will ambulate 300 feet without assistive device with independence with normal gait mechanics including up/down curbs and ramps to improve independence and safety with community ambulation.    Plan     Progress as tolerated and advance as indicated    Jonathon Higginbotham PT,DPT,OCS  Jonathon Higginbotham, PT  8/15/2023

## 2023-08-17 ENCOUNTER — CLINICAL SUPPORT (OUTPATIENT)
Dept: REHABILITATION | Facility: HOSPITAL | Age: 58
End: 2023-08-17
Attending: ORTHOPAEDIC SURGERY
Payer: MEDICARE

## 2023-08-17 DIAGNOSIS — R29.898 WEAKNESS OF RIGHT LOWER EXTREMITY: ICD-10-CM

## 2023-08-17 DIAGNOSIS — M25.661 DECREASED RANGE OF MOTION (ROM) OF RIGHT KNEE: Primary | ICD-10-CM

## 2023-08-17 DIAGNOSIS — Z96.651 STATUS POST TOTAL RIGHT KNEE REPLACEMENT: ICD-10-CM

## 2023-08-17 DIAGNOSIS — R26.9 ABNORMAL GAIT: ICD-10-CM

## 2023-08-17 PROCEDURE — 97112 NEUROMUSCULAR REEDUCATION: CPT

## 2023-08-17 PROCEDURE — 97110 THERAPEUTIC EXERCISES: CPT

## 2023-08-17 NOTE — PROGRESS NOTES
[facility]  Physical Therapy Treatment Note    Name: Sadie Martinez  Clinic Number: [id number]    Therapy Diagnosis:   Encounter Diagnoses   Name Primary?    Decreased range of motion (ROM) of right knee Yes    Weakness of right lower extremity     Status post total right knee replacement     Abnormal gait      Physician: Indra Matos MD    Visit Date: 8/17/2023  Physician Orders: PT Eval and Treat  Medical Diagnosis from Referral: Z96.651 (ICD-10-CM) - Status post total right knee replacement  Evaluation Date: 8/7/2023  Authorization Period Expiration: Only initial evaluation [facility]. Subsequent visits require prior authorization.   Plan of Care Expiration: 9/29/2023  Visit # / Visits authorized: 3/12  Time In: 0930  Time Out: 01030  Total Billable Time: 45 minutes    Precautions: Functional Level Prior to Evaluation: Independent with pain and mild limitations    Subjective     Pt reports: Has been inconsistent with home exercises last week. Pt is anxious to reach full potential.    She was not compliant with home exercise program.  Response to previous treatment: Completed swing bed program    Pain: 4/10  Location: right knee      Objective     Sadie received therapeutic exercises to develop strength, endurance, ROM, and flexibility for 30  minutes including  Quad sets 2 sets x 10 reps  Heel Slides 2 x 10 reps  Hamstring Sets with 90/90 maneuver and with green strap  Terminal knee extension  2 x 10  Hip Slides 1 set x 5 x 15 seconds  Seated knee flex with Red T band resistance 3 sets x 10 reps  Standing knee flexion 1 x 10 reps  Standing hip flexion 1 x 10 reps  Posterior capsular stretch 1 x 5  Static Stretch with heel supported 5 min    Sadie received the following manual therapy techniques: Soft tissue Mobilization were applied to the: Knee  for  minutes, including:  Soft tissue mobilization, patellar mobs    Sadie participated in neuromuscular re-education  activities to improve: Balance and Posture for 15 minutes. The following activities were included:  Step ups 2 sets x 10 reps  SCIFIT x 6 min at Level 6  Weight shifts 1 min  Don Tigney  1 min    Sadie participated in dynamic functional therapeutic activities to improve functional performance for 0 minutes   no    Sadie participated in gait training to improve functional mobility and safety for 5 minutes, including:  Gait pattern with equal stride length, initial contact with heel , equal stance time     Sadie received cold pack for R knee  x minutes to 10.     Right Knee AROM Right Knee PROM   Extension 12 degrees 9 degrees   Flexion (Supine) 89 degrees 92 degrees       Home Exercises Provided and Patient Education Provided     Education provided: Review of home Program    Written Home Exercises Provided: yes. Exercises were reviewed and Sadie was able to demonstrate them prior to the end of the session.  Sadie demonstrated good  understanding of the education provided.     See EMR under Patient Instructions for exercises provided prior visit.    Assessment       Patient expected to advance in all aspects of rehab program  Sadie isprogressing well towards her goals.   Pt prognosis is Good. Good for stated goals    Pt will continue to benefit from skilled outpatient physical therapy to address the deficits listed in the problem list box on initial evaluation, provide pt/family education, and to maximize pt's level of independence in the home and community environment.     Pt's spiritual, cultural, and educational needs considered and pt agreeable to plan of care and goals.     Anticipated barriers to physical therapy: transportation    Goals:    Short Term Goals:  Patient will demonstrate independence with home exercise program to ensure carryover of treatment.  Patient will perform sit to/from stand with standby assist without upper extremity support to improve independence and safety with transfers.  Patient will  improve right lower extremity strength to 4-/5 to improve independence and safety with bed mobility, transfers, and gait.  Patient will ambulate 150 feet without assistive device with standby assist with normal gait mechanics to improve independence and safety with gait.      Long Term Goals:  Patient will perform sit to/from stand with independence without upper extremity support to improve independence and safety with transfers.  Patient will improve right lower extremity strength to 4+/5 to improve independence and safety with bed mobility, transfers, and gait.  Patient will ambulate 300 feet without assistive device with independence with normal gait mechanics including up/down curbs and ramps to improve independence and safety with community ambulation.    Plan     Progress as tolerated and advance as indicated    Jonathon Higginbotham PT,DPT,OCS  Jonathon Higginbotham, PT  8/17/2023

## 2023-08-22 ENCOUNTER — CLINICAL SUPPORT (OUTPATIENT)
Dept: REHABILITATION | Facility: HOSPITAL | Age: 58
End: 2023-08-22
Payer: MEDICARE

## 2023-08-22 DIAGNOSIS — R26.9 ABNORMAL GAIT: ICD-10-CM

## 2023-08-22 DIAGNOSIS — M25.661 DECREASED RANGE OF MOTION (ROM) OF RIGHT KNEE: Primary | ICD-10-CM

## 2023-08-22 DIAGNOSIS — Z96.651 STATUS POST TOTAL RIGHT KNEE REPLACEMENT: ICD-10-CM

## 2023-08-22 DIAGNOSIS — R29.898 WEAKNESS OF RIGHT LOWER EXTREMITY: ICD-10-CM

## 2023-08-22 PROCEDURE — 97110 THERAPEUTIC EXERCISES: CPT

## 2023-08-22 PROCEDURE — 97112 NEUROMUSCULAR REEDUCATION: CPT

## 2023-08-22 NOTE — PROGRESS NOTES
[facility]  Physical Therapy Treatment Note    Name: Sadie Martinez  Clinic Number: [id number]    Therapy Diagnosis:   Encounter Diagnoses   Name Primary?    Decreased range of motion (ROM) of right knee Yes    Weakness of right lower extremity     Status post total right knee replacement     Abnormal gait      Physician: Indra Matos MD    Visit Date: 8/22/2023  Physician Orders: PT Eval and Treat  Medical Diagnosis from Referral: Z96.651 (ICD-10-CM) - Status post total right knee replacement  Evaluation Date: 8/7/2023  Authorization Period Expiration: Only initial evaluation [facility]. Subsequent visits require prior authorization.   Plan of Care Expiration: 9/29/2023  Visit # / Visits authorized: 4/12  Time In: 0930 treatment began before check in  Time Out: 01015   Total Billable Time: 45 minutes    Precautions: Functional Level Prior to Evaluation: Independent with pain and mild limitations    Subjective     Pt reports: Has been inconsistent with home exercises last week. Pt is anxious to reach full potential.    She was not compliant with home exercise program.  Response to previous treatment: Completed swing bed program    Pain: 4/10  Location: right knee      Objective     Sadie received therapeutic exercises to develop strength, endurance, ROM, and flexibility for 30  minutes including  Quad sets 2 sets x 10 reps  Heel Slides 2 x 10 reps  Hamstring Sets with 90/90 maneuver and with green strap  Terminal knee extension  2 x 10  Hip Slides 1 set x 5 x 10 seconds  Seated knee flex with Red T band resistance 3 sets x 10 reps  Standing knee flexion 1 x 10 reps  Standing hip flexion 1 x 10 reps  Posterior capsular stretch 1 x 5  Static Stretch with heel supported 2 min    Sadie received the following manual therapy techniques: Soft tissue Mobilization were applied to the: Knee  for  minutes, including:  Soft tissue mobilization, patellar mobs    Sadie participated in  neuromuscular re-education activities to improve: Balance and Posture for 15 minutes. The following activities were included:  Step ups 2 sets x 10 reps  SCIFIT x 6 min at Level 6  Weight shifts 1 min  Don Tigney  1 min    Sadie participated in dynamic functional therapeutic activities to improve functional performance for 0 minutes   no    Sadie participated in gait training to improve functional mobility and safety for 5 minutes, including:  Gait pattern with equal stride length, initial contact with heel , equal stance time     Sadie received cold pack for R knee  x minutes to 10.     Right Knee AROM Right Knee PROM   Extension 12 degrees 9 degrees   Flexion (Supine) 89 degrees 92 degrees       Home Exercises Provided and Patient Education Provided     Education provided: Review of home Program    Written Home Exercises Provided: yes. Exercises were reviewed and Sadie was able to demonstrate them prior to the end of the session.  Sadie demonstrated good  understanding of the education provided.     See EMR under Patient Instructions for exercises provided prior visit.    Assessment     Patient expected to advance in all aspects of rehab program  Sadie isprogressing well towards her goals.   Pt prognosis is Good. Good for stated goals    Pt will continue to benefit from skilled outpatient physical therapy to address the deficits listed in the problem list box on initial evaluation, provide pt/family education, and to maximize pt's level of independence in the home and community environment.     Pt's spiritual, cultural, and educational needs considered and pt agreeable to plan of care and goals.     Anticipated barriers to physical therapy: transportation    Goals:    Short Term Goals:  Patient will demonstrate independence with home exercise program to ensure carryover of treatment.  Patient will perform sit to/from stand with standby assist without upper extremity support to improve independence and safety with  transfers.  Patient will improve right lower extremity strength to 4-/5 to improve independence and safety with bed mobility, transfers, and gait.  Patient will ambulate 150 feet without assistive device with standby assist with normal gait mechanics to improve independence and safety with gait.      Long Term Goals:  Patient will perform sit to/from stand with independence without upper extremity support to improve independence and safety with transfers.  Patient will improve right lower extremity strength to 4+/5 to improve independence and safety with bed mobility, transfers, and gait.  Patient will ambulate 300 feet without assistive device with independence with normal gait mechanics including up/down curbs and ramps to improve independence and safety with community ambulation.    Plan     Progress as tolerated and advance as indicated    Jonathon Higginbotham PT,DPT,OCS  Jonathon Higginbotham, PT  8/22/2023

## 2023-08-24 ENCOUNTER — CLINICAL SUPPORT (OUTPATIENT)
Dept: REHABILITATION | Facility: HOSPITAL | Age: 58
End: 2023-08-24
Payer: MEDICARE

## 2023-08-24 DIAGNOSIS — M25.661 DECREASED RANGE OF MOTION (ROM) OF RIGHT KNEE: Primary | ICD-10-CM

## 2023-08-24 DIAGNOSIS — Z96.651 STATUS POST TOTAL RIGHT KNEE REPLACEMENT: ICD-10-CM

## 2023-08-24 DIAGNOSIS — R29.898 WEAKNESS OF RIGHT LOWER EXTREMITY: ICD-10-CM

## 2023-08-24 DIAGNOSIS — R26.9 ABNORMAL GAIT: ICD-10-CM

## 2023-08-24 PROCEDURE — 97110 THERAPEUTIC EXERCISES: CPT

## 2023-08-24 PROCEDURE — 97140 MANUAL THERAPY 1/> REGIONS: CPT

## 2023-08-24 PROCEDURE — 97112 NEUROMUSCULAR REEDUCATION: CPT

## 2023-08-24 NOTE — PROGRESS NOTES
[facility]  Physical Therapy Treatment Note    Name: Sadie Martinez  Clinic Number: [id number]    Therapy Diagnosis:   Encounter Diagnoses   Name Primary?    Decreased range of motion (ROM) of right knee Yes    Weakness of right lower extremity     Status post total right knee replacement     Abnormal gait      Physician: Indra Matos MD    Visit Date: 8/24/2023  Physician Orders: PT Eval and Treat  Medical Diagnosis from Referral: Z96.651 (ICD-10-CM) - Status post total right knee replacement  Evaluation Date: 8/7/2023  Authorization Period Expiration: Only initial evaluation approved by [facility]. Subsequent visits require prior authorization.   Plan of Care Expiration: 9/29/2023  Visit # / Visits authorized: 5/12  Time In: 0930 treatment began before check in  Time Out: 01030   Total Billable Time: 60 minutes    Precautions: Functional Level Prior to Evaluation: Independent with pain and mild limitations    Subjective     Pt reports: Has been inconsistent with home exercises last week. Pt is anxious to reach full potential.    She was not compliant with home exercise program.  Response to previous treatment: Completed swing bed program    Pain: 4/10  Location: right knee      Objective     Sadie received therapeutic exercises to develop strength, endurance, ROM, and flexibility for 30  minutes including  Quad sets 2 sets x 10 reps  Heel Slides 2 x 10 reps  Hamstring Sets with 90/90 maneuver and with green strap  Terminal knee extension  2 x 10  Hip Slides 1 set x 5 x 10 seconds  Seated knee flex with Red T band resistance 3 sets x 10 reps  Standing knee flexion 1 x 10 reps  Standing hip flexion 1 x 10 reps  Posterior capsular stretch 1 x 5  Static Stretch with heel supported 2 min    Sadie received the following manual therapy techniques: Soft tissue Mobilization were applied to the: Knee  for 10  minutes, including:  Soft tissue mobilization, patellar mobs manual stretch,      Sadie participated in neuromuscular re-education activities to improve: Balance and Posture for 15 minutes. The following activities were included:  Step ups 2 sets x 10 reps  SCIFIT x 6 min at Level 6  Weight shifts 1 min  Don Tigney  1 min  Prone quad sets 2 sets x 10 reps  Prone knee flexion 2 sets x 10 reps  Calf raises 2 sets x 10 reps    Sadie participated in dynamic functional therapeutic activities to improve functional performance for 0 minutes   no    Sadie participated in gait training to improve functional mobility and safety for 2 minutes, including:  Gait pattern with equal stride length, initial contact with heel , equal stance time     aSdie received cold pack for R knee  x minutes to 10.     Right Knee AROM Right Knee PROM   Extension 10 degrees 5 degrees   Flexion (Supine) 92 degrees 95 degrees       Home Exercises Provided and Patient Education Provided     Education provided: Review of home Program    Written Home Exercises Provided: yes. Exercises were reviewed and Sadie was able to demonstrate them prior to the end of the session.  Sadie demonstrated good  understanding of the education provided.     See EMR under Patient Instructions for exercises provided prior visit.    Assessment     Patient expected to advance in all aspects of rehab program  Sadie isprogressing well towards her goals.   Pt prognosis is Good. Good for stated goals    Pt will continue to benefit from skilled outpatient physical therapy to address the deficits listed in the problem list box on initial evaluation, provide pt/family education, and to maximize pt's level of independence in the home and community environment.     Pt's spiritual, cultural, and educational needs considered and pt agreeable to plan of care and goals.     Anticipated barriers to physical therapy: transportation    Goals:    Short Term Goals:  Patient will demonstrate independence with home exercise program to ensure carryover of treatment.  Patient  will perform sit to/from stand with standby assist without upper extremity support to improve independence and safety with transfers.  Patient will improve right lower extremity strength to 4-/5 to improve independence and safety with bed mobility, transfers, and gait.  Patient will ambulate 150 feet without assistive device with standby assist with normal gait mechanics to improve independence and safety with gait.      Long Term Goals:  Patient will perform sit to/from stand with independence without upper extremity support to improve independence and safety with transfers.  Patient will improve right lower extremity strength to 4+/5 to improve independence and safety with bed mobility, transfers, and gait.  Patient will ambulate 300 feet without assistive device with independence with normal gait mechanics including up/down curbs and ramps to improve independence and safety with community ambulation.    Plan     Progress as tolerated and advance as indicated    Jonathon Higginbotham PT,DPT,OCS  Jonathon Higginbotham, PT  8/24/2023

## 2023-08-30 ENCOUNTER — CLINICAL SUPPORT (OUTPATIENT)
Dept: REHABILITATION | Facility: HOSPITAL | Age: 58
End: 2023-08-30
Attending: ORTHOPAEDIC SURGERY
Payer: MEDICARE

## 2023-08-30 DIAGNOSIS — Z96.651 STATUS POST TOTAL RIGHT KNEE REPLACEMENT: ICD-10-CM

## 2023-08-30 DIAGNOSIS — M25.661 DECREASED RANGE OF MOTION (ROM) OF RIGHT KNEE: Primary | ICD-10-CM

## 2023-08-30 DIAGNOSIS — R29.898 WEAKNESS OF RIGHT LOWER EXTREMITY: ICD-10-CM

## 2023-08-30 DIAGNOSIS — R26.9 ABNORMAL GAIT: ICD-10-CM

## 2023-08-30 PROCEDURE — 97110 THERAPEUTIC EXERCISES: CPT

## 2023-08-30 PROCEDURE — 97112 NEUROMUSCULAR REEDUCATION: CPT

## 2023-08-30 NOTE — PROGRESS NOTES
[facility]  Physical Therapy Treatment Note    Name: Sadie Martinez  Clinic Number: [id number]    Therapy Diagnosis:   Encounter Diagnoses   Name Primary?    Decreased range of motion (ROM) of right knee Yes    Weakness of right lower extremity     Status post total right knee replacement     Abnormal gait      Physician: Indra Matos MD    Visit Date: 8/30/2023  Physician Orders: PT Eval and Treat  Medical Diagnosis from Referral: Z96.651 (ICD-10-CM) - Status post total right knee replacement  Evaluation Date: 8/7/2023  Authorization Period Expiration: Only initial evaluation approved by [facility]. Subsequent visits require prior authorization.   Plan of Care Expiration: 9/29/2023  Visit # / Visits authorized: 6/12  Time In: 0930   Time Out: 1015  Total Billable Time: 45 minutes    Precautions: Functional Level Prior to Evaluation: Independent with pain and mild limitations    Subjective     Pt reports: Has been inconsistent with home exercises last week. Pt is anxious to reach full potential.    She was not compliant with home exercise program.  Response to previous treatment: Completed swing bed program    Pain: 2/10  Location: right knee      Objective     Sadie received therapeutic exercises to develop strength, endurance, ROM, and flexibility for 30  minutes including  Quad sets 2 sets x 10 reps  Heel Slides 2 x 10 reps  Hamstring Sets with 90/90 maneuver and with green strap  Terminal knee extension  2 x 10  Hip Slides 1 set x 5 x 10 seconds  Seated knee flex with Red T band resistance 3 sets x 10 reps  Standing knee flexion 1 x 10 reps  Standing hip flexion 1 x 10 reps  Posterior capsular stretch 1 x 5  Static Stretch with heel supported 2 min    Sadie received the following manual therapy techniques: Soft tissue Mobilization were applied to the: Knee  for 0  minutes, including:  Soft tissue mobilization, patellar mobs manual stretch,     Sadie participated in neuromuscular  re-education activities to improve: Balance and Posture for 15 minutes. The following activities were included:  Step ups 2 sets x 10 reps  SCIFIT x 6 min at Level 6  Weight shifts 1 min  Don Tigney  1 min  Prone quad sets 2 sets x 10 reps  Prone knee flexion 2 sets x 10 reps  Calf raises 2 sets x 10 reps  Seated Bike 5 min for ROM    Sadie participated in dynamic functional therapeutic activities to improve functional performance for 0 minutes   no    Sadie participated in gait training to improve functional mobility and safety for 2 minutes, including:  Gait pattern with equal stride length, initial contact with heel , equal stance time     Sadie received cold pack for R knee  x minutes to 10.     Right Knee AROM Right Knee PROM   Extension 5 degrees 2 degrees   Flexion (Supine) 102 degrees 105 degrees       Home Exercises Provided and Patient Education Provided     Education provided: Review of home Program    Written Home Exercises Provided: yes. Exercises were reviewed and Sadie was able to demonstrate them prior to the end of the session.  Sadie demonstrated good  understanding of the education provided.     See EMR under Patient Instructions for exercises provided prior visit.    Assessment     Patient expected to advance in all aspects of rehab program  Sadie isprogressing well towards her goals.   Pt prognosis is Good. Good for stated goals    Pt will continue to benefit from skilled outpatient physical therapy to address the deficits listed in the problem list box on initial evaluation, provide pt/family education, and to maximize pt's level of independence in the home and community environment.     Pt's spiritual, cultural, and educational needs considered and pt agreeable to plan of care and goals.     Anticipated barriers to physical therapy: transportation    Goals:    Short Term Goals:  Patient will demonstrate independence with home exercise program to ensure carryover of treatment.  Patient will perform  sit to/from stand with standby assist without upper extremity support to improve independence and safety with transfers.  Patient will improve right lower extremity strength to 4-/5 to improve independence and safety with bed mobility, transfers, and gait.  Patient will ambulate 150 feet without assistive device with standby assist with normal gait mechanics to improve independence and safety with gait.      Long Term Goals:  Patient will perform sit to/from stand with independence without upper extremity support to improve independence and safety with transfers.  Patient will improve right lower extremity strength to 4+/5 to improve independence and safety with bed mobility, transfers, and gait.  Patient will ambulate 300 feet without assistive device with independence with normal gait mechanics including up/down curbs and ramps to improve independence and safety with community ambulation.    Plan     Progress as tolerated and advance as indicated    Jonathon Higginbotham PT,DPT,OCS  Jonathon Higginbotham, PT  8/30/2023

## 2023-08-31 ENCOUNTER — CLINICAL SUPPORT (OUTPATIENT)
Dept: REHABILITATION | Facility: HOSPITAL | Age: 58
End: 2023-08-31
Payer: MEDICARE

## 2023-08-31 DIAGNOSIS — R29.898 WEAKNESS OF RIGHT LOWER EXTREMITY: ICD-10-CM

## 2023-08-31 DIAGNOSIS — M25.661 DECREASED RANGE OF MOTION (ROM) OF RIGHT KNEE: Primary | ICD-10-CM

## 2023-08-31 DIAGNOSIS — Z96.651 STATUS POST TOTAL RIGHT KNEE REPLACEMENT: ICD-10-CM

## 2023-08-31 DIAGNOSIS — R26.9 ABNORMAL GAIT: ICD-10-CM

## 2023-08-31 PROCEDURE — 97112 NEUROMUSCULAR REEDUCATION: CPT

## 2023-08-31 PROCEDURE — 97110 THERAPEUTIC EXERCISES: CPT

## 2023-08-31 NOTE — PROGRESS NOTES
[facility]  Physical Therapy Treatment Note    Name: Sadie Martinez  Clinic Number: [id number]    Therapy Diagnosis:   Encounter Diagnoses   Name Primary?    Decreased range of motion (ROM) of right knee Yes    Weakness of right lower extremity     Status post total right knee replacement     Abnormal gait      Physician: Indra Matos MD    Visit Date: 8/31/2023  Physician Orders: PT Eval and Treat  Medical Diagnosis from Referral: Z96.651 (ICD-10-CM) - Status post total right knee replacement  Evaluation Date: 8/7/2023  Authorization Period Expiration: Only initial evaluation approved by [facility]. Subsequent visits require prior authorization.   Plan of Care Expiration: 9/29/2023  Visit # / Visits authorized: 7/12  Time In: 0930   Time Out: 1015  Total Billable Time: 45 minutes    Precautions: Functional Level Prior to Evaluation: Independent with pain and mild limitations    Subjective     Pt reports: Has been inconsistent with home exercises last week. Pt is anxious to reach full potential.  Pleased with her ongoing improvements    She was not compliant with home exercise program.  Response to previous treatment: Completed swing bed program    Pain: 2/10  Location: right knee      Objective     Sadie received therapeutic exercises to develop strength, endurance, ROM, and flexibility for 30  minutes including  Quad sets 2 sets x 10 reps  Heel Slides 2 x 10 reps  Hamstring Sets with 90/90 maneuver and with green strap  Terminal knee extension  2 x 10  Hip Slides 1 set x 5 x 10 seconds  Seated knee flex with Red T band resistance 3 sets x 10 reps  Standing knee flexion 1 x 10 reps  Standing hip flexion 1 x 10 reps  Posterior capsular stretch 1 x 5  Static Stretch with heel supported 2 min    Sadie received the following manual therapy techniques: Soft tissue Mobilization were applied to the: Knee  for 0  minutes, including:  Soft tissue mobilization, patellar mobs manual stretch,      Sadie participated in neuromuscular re-education activities to improve: Balance and Posture for 15 minutes. The following activities were included:  Step ups 2 sets x 10 reps  SCIFIT x 6 min at Level 6  Weight shifts 1 min  Don Tigney  1 min  Prone quad sets 2 sets x 10 reps  Prone knee flexion 2 sets x 10 reps  Calf raises 2 sets x 10 reps  Seated Bike 5 min for ROM    Sadie participated in dynamic functional therapeutic activities to improve functional performance for 0 minutes   no    Sadie participated in gait training to improve functional mobility and safety for 2 minutes, including:  Gait pattern with equal stride length, initial contact with heel , equal stance time     Sadie received cold pack for R knee  x minutes to 10.     Right Knee AROM Right Knee PROM   Extension 5 degrees 2 degrees   Flexion (Supine) 102 degrees 105 degrees       Home Exercises Provided and Patient Education Provided     Education provided: Review of home Program    Written Home Exercises Provided: yes. Exercises were reviewed and Sadie was able to demonstrate them prior to the end of the session.  Sadie demonstrated good  understanding of the education provided.     See EMR under Patient Instructions for exercises provided prior visit.    Assessment     Patient expected to advance in all aspects of rehab program  Sadie isprogressing well towards her goals.   Pt prognosis is Good. Good for stated goals    Pt will continue to benefit from skilled outpatient physical therapy to address the deficits listed in the problem list box on initial evaluation, provide pt/family education, and to maximize pt's level of independence in the home and community environment.     Pt's spiritual, cultural, and educational needs considered and pt agreeable to plan of care and goals.     Anticipated barriers to physical therapy: transportation    Goals:    Short Term Goals:  Patient will demonstrate independence with home exercise program to ensure  carryover of treatment.  Patient will perform sit to/from stand with standby assist without upper extremity support to improve independence and safety with transfers.  Patient will improve right lower extremity strength to 4-/5 to improve independence and safety with bed mobility, transfers, and gait.  Patient will ambulate 150 feet without assistive device with standby assist with normal gait mechanics to improve independence and safety with gait.      Long Term Goals:  Patient will perform sit to/from stand with independence without upper extremity support to improve independence and safety with transfers.  Patient will improve right lower extremity strength to 4+/5 to improve independence and safety with bed mobility, transfers, and gait.  Patient will ambulate 300 feet without assistive device with independence with normal gait mechanics including up/down curbs and ramps to improve independence and safety with community ambulation.    Plan     Progress as tolerated and advance as indicated    Jonathon Higginbotham PT,DPT,OCS  Jonathon Higginbotham, PT  8/31/2023

## 2023-09-06 ENCOUNTER — CLINICAL SUPPORT (OUTPATIENT)
Dept: REHABILITATION | Facility: HOSPITAL | Age: 58
End: 2023-09-06
Payer: MEDICARE

## 2023-09-06 DIAGNOSIS — R26.9 ABNORMAL GAIT: ICD-10-CM

## 2023-09-06 DIAGNOSIS — Z96.651 STATUS POST TOTAL RIGHT KNEE REPLACEMENT: ICD-10-CM

## 2023-09-06 DIAGNOSIS — M25.661 DECREASED RANGE OF MOTION (ROM) OF RIGHT KNEE: Primary | ICD-10-CM

## 2023-09-06 DIAGNOSIS — R29.898 WEAKNESS OF RIGHT LOWER EXTREMITY: ICD-10-CM

## 2023-09-06 PROCEDURE — 97112 NEUROMUSCULAR REEDUCATION: CPT

## 2023-09-06 PROCEDURE — 97110 THERAPEUTIC EXERCISES: CPT

## 2023-09-06 NOTE — PROGRESS NOTES
[facility]  Physical Therapy Treatment Note    Name: Sadie Martinez  Clinic Number: [id number]    Therapy Diagnosis:   Encounter Diagnoses   Name Primary?    Decreased range of motion (ROM) of right knee Yes    Weakness of right lower extremity     Status post total right knee replacement     Abnormal gait      Physician: Indra Matos MD    Visit Date: 9/6/2023  Physician Orders: PT Eval and Treat  Medical Diagnosis from Referral: Z96.651 (ICD-10-CM) - Status post total right knee replacement  Evaluation Date: 8/7/2023  Authorization Period Expiration: Only initial evaluation approved by [facility]. Subsequent visits require prior authorization.   Plan of Care Expiration: 9/29/2023  Visit # / Visits authorized: 8/12  Time In: 1230   Time Out: 1315  Total Billable Time: 45 minutes    Precautions: Functional Level Prior to Evaluation: Independent with pain and mild limitations    Subjective     Pt reports: Has been inconsistent with home exercises last week. Pt is anxious to reach full potential.  Pleased with her ongoing improvements  Pleasant, encouraged with overall improvements    She was not compliant with home exercise program.  Response to previous treatment: Completed swing bed program    Pain: 2/10  Location: right knee      Objective     Sadie received therapeutic exercises to develop strength, endurance, ROM, and flexibility for 30  minutes including  Quad sets 2 sets x 10 reps  Heel Slides 2 x 10 reps  Hamstring Sets with 90/90 maneuver and with green strap  Terminal knee extension  2 x 10  Hip Slides 1 set x 5 x 10 seconds  Seated knee flex with Red T band resistance 3 sets x 10 reps  Standing knee flexion 1 x 10 reps  Standing hip flexion 1 x 10 reps  Posterior capsular stretch 1 x 5  Static Stretch with heel supported 2 min    Sadie received the following manual therapy techniques: Soft tissue Mobilization were applied to the: Knee  for 0  minutes, including:  Soft  tissue mobilization, patellar mobs manual stretch,     Sadie participated in neuromuscular re-education activities to improve: Balance and Posture for 15 minutes. The following activities were included:  Step ups 2 sets x 10 reps  SCIFIT x 6 min at Level 6  Weight shifts 1 min  Don Tigney  1 min  Prone quad sets 2 sets x 10 reps  Prone knee flexion 2 sets x 10 reps  Calf raises 2 sets x 10 reps  Seated Bike 5 min for ROM    Sadie participated in dynamic functional therapeutic activities to improve functional performance for 0 minutes   no    Sadie participated in gait training to improve functional mobility and safety for 2 minutes, including:  Gait pattern with equal stride length, initial contact with heel , equal stance time     Sadie received cold pack for R knee  x minutes to 10.     Right Knee AROM Right Knee PROM   Extension 5 degrees 2 degrees   Flexion (Supine) 102 degrees 105 degrees       Home Exercises Provided and Patient Education Provided     Education provided: Review of home Program    Written Home Exercises Provided: yes. Exercises were reviewed and Sadie was able to demonstrate them prior to the end of the session.  Sadie demonstrated good  understanding of the education provided.     See EMR under Patient Instructions for exercises provided prior visit.    Assessment     Patient expected to advance in all aspects of rehab program  Sadie isprogressing well towards her goals.   Pt prognosis is Good. Good for stated goals    Pt will continue to benefit from skilled outpatient physical therapy to address the deficits listed in the problem list box on initial evaluation, provide pt/family education, and to maximize pt's level of independence in the home and community environment.     Pt's spiritual, cultural, and educational needs considered and pt agreeable to plan of care and goals.     Anticipated barriers to physical therapy: transportation    Goals:    Short Term Goals:  Patient will demonstrate  independence with home exercise program to ensure carryover of treatment.  Patient will perform sit to/from stand with standby assist without upper extremity support to improve independence and safety with transfers.  Patient will improve right lower extremity strength to 4-/5 to improve independence and safety with bed mobility, transfers, and gait.  Patient will ambulate 150 feet without assistive device with standby assist with normal gait mechanics to improve independence and safety with gait.      Long Term Goals:  Patient will perform sit to/from stand with independence without upper extremity support to improve independence and safety with transfers.  Patient will improve right lower extremity strength to 4+/5 to improve independence and safety with bed mobility, transfers, and gait.  Patient will ambulate 300 feet without assistive device with independence with normal gait mechanics including up/down curbs and ramps to improve independence and safety with community ambulation.    Plan     Progress as tolerated and advance as indicated    Jonathon Higginbotham PT,DPT,OCS  Jonathon Higginbotham, PT  9/6/2023

## 2023-09-07 ENCOUNTER — CLINICAL SUPPORT (OUTPATIENT)
Dept: REHABILITATION | Facility: HOSPITAL | Age: 58
End: 2023-09-07
Attending: ORTHOPAEDIC SURGERY
Payer: MEDICARE

## 2023-09-07 DIAGNOSIS — Z96.651 STATUS POST TOTAL RIGHT KNEE REPLACEMENT: ICD-10-CM

## 2023-09-07 DIAGNOSIS — R26.9 ABNORMAL GAIT: ICD-10-CM

## 2023-09-07 DIAGNOSIS — R29.898 WEAKNESS OF RIGHT LOWER EXTREMITY: ICD-10-CM

## 2023-09-07 DIAGNOSIS — M25.661 DECREASED RANGE OF MOTION (ROM) OF RIGHT KNEE: Primary | ICD-10-CM

## 2023-09-07 PROCEDURE — 97112 NEUROMUSCULAR REEDUCATION: CPT | Mod: CQ

## 2023-09-07 PROCEDURE — 97530 THERAPEUTIC ACTIVITIES: CPT | Mod: CQ

## 2023-09-07 PROCEDURE — 97110 THERAPEUTIC EXERCISES: CPT | Mod: CQ

## 2023-09-07 NOTE — PROGRESS NOTES
[facility]  Physical Therapy Treatment Note    Name: Sadie Martinez  Clinic Number: [id number]    Therapy Diagnosis: No diagnosis found.  Physician: Indra Matos MD    Visit Date: 9/7/2023    Physician Orders: PT Eval and Treat  Medical Diagnosis from Referral: Z96.651 (ICD-10-CM) - Status post total right knee replacement  Evaluation Date: 8/7/2023  Authorization Period Expiration: Only initial evaluation approved by [employer]. Subsequent visits require prior authorization.   Plan of Care Expiration: 9/29/2023  Visit # / Visits authorized: 9/13   PTA Visit #: 1    Time In: 0930  Time Out: 1013  Total Billable Time: 43 minutes    Precautions:  Standard, Diabetes, blood thinners, Fall, pacemaker, and cancer (breast x 4 years ago; in remission)    Subjective     Pt reports: Patient reports having no pain right now.    She was compliant with home exercise program.    Pain: 0/10  Location: right knee      Objective     Sadie received therapeutic exercises to develop strength, ROM, and flexibility for 20 minutes including:  Nu step x 6 minutes   Slant board stretch x 2 minutes   Hamstring stretch on step 3 x 20 second holds   Quad sets 2 x 10   Short arc quads with 2 lb ankle weight 2 x 10  Straight leg raises 2 x 10  Heel slides with green strap x 15 with 5 second holds     Sadie participated in neuromuscular re-education activities to improve: Balance and Coordination for 13 minutes. The following activities were included:  Heel raises 2 x 10   Standing hamstring curls with 2 lb ankle weight 2 x 10   Standing hip abduction with 2 lb ankle weight 2 x 10     Sadie participated in dynamic functional therapeutic activities to improve functional performance for 10 minutes, including:  Chair squats 2 x 10  4 in step ups 2 x 10     Sadie participated in gait training to improve functional mobility and safety for 0 minutes, including:  Not performed this visit       Right Knee AROM   Extension 0  degrees   Flexion (Supine) 109 degrees     Home Exercises Provided and Patient Education Provided     Education provided: no new exercises added this visit     Written Home Exercises Provided: Patient instructed to cont prior HEP. Exercises were reviewed and Sadie was able to demonstrate them prior to the end of the session.  Sadie demonstrated good  understanding of the education provided.     See EMR under Patient Instructions for exercises provided prior visit.    Assessment     Patient able to perform all exercises with no new complaints. Patient reported to be feeling good following treatment today.     Sadie isprogressing well towards her goals.   Pt prognosis is Excellent.     Pt will continue to benefit from skilled outpatient physical therapy to address the deficits listed in the problem list box on initial evaluation, provide pt/family education, and to maximize pt's level of independence in the home and community environment.     Pt's spiritual, cultural, and educational needs considered and pt agreeable to plan of care and goals.     Anticipated barriers to physical therapy: compliance with home exercise program; natural course of healing    Goals:    Short Term Goals:  Patient will demonstrate independence with home exercise program to ensure carryover of treatment.  Patient will perform sit to/from stand with standby assist without upper extremity support to improve independence and safety with transfers.  Patient will improve right lower extremity strength to 4-/5 to improve independence and safety with bed mobility, transfers, and gait.  Patient will ambulate 150 feet without assistive device with standby assist with normal gait mechanics to improve independence and safety with gait.      Long Term Goals:  Patient will perform sit to/from stand with independence without upper extremity support to improve independence and safety with transfers.  Patient will improve right lower extremity strength to 4+/5  to improve independence and safety with bed mobility, transfers, and gait.  Patient will ambulate 300 feet without assistive device with independence with normal gait mechanics including up/down curbs and ramps to improve independence and safety with community ambulation.    Plan     Will continue with appropriate POC      Ynes Pena, PTA  9/7/2023

## 2023-09-12 ENCOUNTER — CLINICAL SUPPORT (OUTPATIENT)
Dept: REHABILITATION | Facility: HOSPITAL | Age: 58
End: 2023-09-12
Payer: MEDICARE

## 2023-09-12 DIAGNOSIS — M25.661 DECREASED RANGE OF MOTION (ROM) OF RIGHT KNEE: Primary | ICD-10-CM

## 2023-09-12 DIAGNOSIS — R26.9 ABNORMAL GAIT: ICD-10-CM

## 2023-09-12 DIAGNOSIS — R29.898 WEAKNESS OF RIGHT LOWER EXTREMITY: ICD-10-CM

## 2023-09-12 DIAGNOSIS — Z96.651 STATUS POST TOTAL RIGHT KNEE REPLACEMENT: ICD-10-CM

## 2023-09-12 PROCEDURE — 97110 THERAPEUTIC EXERCISES: CPT

## 2023-09-12 PROCEDURE — 97112 NEUROMUSCULAR REEDUCATION: CPT

## 2023-09-12 NOTE — PROGRESS NOTES
[facility]  Physical Therapy Treatment Note    Name: Sadie Martinez  Clinic Number: [id number]    Therapy Diagnosis:   Encounter Diagnoses   Name Primary?    Decreased range of motion (ROM) of right knee Yes    Weakness of right lower extremity     Status post total right knee replacement     Abnormal gait      Physician: Indra Matos MD    Visit Date: 9/12/2023  Physician Orders: PT Eval and Treat  Medical Diagnosis from Referral: Z96.651 (ICD-10-CM) - Status post total right knee replacement  Evaluation Date: 8/7/2023  Authorization Period Expiration: Only initial evaluation approved by [facility]. Subsequent visits require prior authorization.   Plan of Care Expiration: 9/29/2023  Visit # / Visits authorized: 10/12  Time In: 0930   Time Out: 1015  Total Billable Time: 45 minutes    Precautions: Functional Level Prior to Evaluation: Independent with pain and mild limitations    Subjective     Pt reports: Has been inconsistent with home exercises last week. Pt is anxious to reach full potential.  Pleased with her ongoing improvements  Pleasant, encouraged with overall improvements    She was not compliant with home exercise program.  Response to previous treatment: Completed swing bed program    Pain: 2/10  Location: right knee      Objective     Sadie received therapeutic exercises to develop strength, endurance, ROM, and flexibility for 30  minutes including  Quad sets 2 sets x 10 reps  Heel Slides 2 x 10 reps  Hamstring Sets with 90/90 maneuver and with green strap  Terminal knee extension  2 x 10  Hip Slides 1 set x 5 x 10 seconds  Seated knee flex with Red T band resistance 3 sets x 10 reps  Standing knee flexion 1 x 10 reps  Standing hip flexion 1 x 10 reps  Posterior capsular stretch 1 x 5  Static Stretch with heel supported 2 min    Sadie received the following manual therapy techniques: Soft tissue Mobilization were applied to the: Knee  for 0  minutes, including:  Soft  tissue mobilization, patellar mobs manual stretch,     Sadie participated in neuromuscular re-education activities to improve: Balance and Posture for 15 minutes. The following activities were included:  Step ups 2 sets x 10 reps  SCIFIT x 6 min at Level 6  Weight shifts 1 min  Don Tigney  1 min  Prone quad sets 2 sets x 10 reps  Prone knee flexion 2 sets x 10 reps  Calf raises 2 sets x 10 reps  Seated Bike 5 min for ROM    Sadie participated in dynamic functional therapeutic activities to improve functional performance for 0 minutes   no    Sadie participated in gait training to improve functional mobility and safety for 2 minutes, including:  Gait pattern with equal stride length, initial contact with heel , equal stance time     Sadie received cold pack for R knee  x minutes to 10.     Right Knee AROM Right Knee PROM   Extension 5 degrees 2 degrees   Flexion (Supine) 102 degrees 105 degrees       Home Exercises Provided and Patient Education Provided     Education provided: Review of home Program    Written Home Exercises Provided: yes. Exercises were reviewed and Sadie was able to demonstrate them prior to the end of the session.  Sadie demonstrated good  understanding of the education provided.     See EMR under Patient Instructions for exercises provided prior visit.    Assessment     Patient expected to advance in all aspects of rehab program  Sadie isprogressing well towards her goals.   Pt prognosis is Good. Good for stated goals    Pt will continue to benefit from skilled outpatient physical therapy to address the deficits listed in the problem list box on initial evaluation, provide pt/family education, and to maximize pt's level of independence in the home and community environment.     Pt's spiritual, cultural, and educational needs considered and pt agreeable to plan of care and goals.     Anticipated barriers to physical therapy: transportation    Goals:    Short Term Goals:  Patient will demonstrate  independence with home exercise program to ensure carryover of treatment.  Patient will perform sit to/from stand with standby assist without upper extremity support to improve independence and safety with transfers.  Patient will improve right lower extremity strength to 4-/5 to improve independence and safety with bed mobility, transfers, and gait.  Patient will ambulate 150 feet without assistive device with standby assist with normal gait mechanics to improve independence and safety with gait.      Long Term Goals:  Patient will perform sit to/from stand with independence without upper extremity support to improve independence and safety with transfers.  Patient will improve right lower extremity strength to 4+/5 to improve independence and safety with bed mobility, transfers, and gait.  Patient will ambulate 300 feet without assistive device with independence with normal gait mechanics including up/down curbs and ramps to improve independence and safety with community ambulation.    Plan     Progress as tolerated and advance as indicated    Jonathon Higginbotham PT,DPT,OCS  Jonathon Higginbotham, PT  9/12/2023

## 2023-09-14 ENCOUNTER — CLINICAL SUPPORT (OUTPATIENT)
Dept: REHABILITATION | Facility: HOSPITAL | Age: 58
End: 2023-09-14
Attending: ORTHOPAEDIC SURGERY
Payer: MEDICARE

## 2023-09-14 DIAGNOSIS — Z96.651 STATUS POST TOTAL RIGHT KNEE REPLACEMENT: ICD-10-CM

## 2023-09-14 DIAGNOSIS — R29.898 WEAKNESS OF RIGHT LOWER EXTREMITY: ICD-10-CM

## 2023-09-14 DIAGNOSIS — M25.661 DECREASED RANGE OF MOTION (ROM) OF RIGHT KNEE: Primary | ICD-10-CM

## 2023-09-14 DIAGNOSIS — R26.9 ABNORMAL GAIT: ICD-10-CM

## 2023-09-14 PROCEDURE — 97530 THERAPEUTIC ACTIVITIES: CPT | Mod: CQ

## 2023-09-14 PROCEDURE — 97110 THERAPEUTIC EXERCISES: CPT | Mod: CQ

## 2023-09-14 PROCEDURE — 97112 NEUROMUSCULAR REEDUCATION: CPT | Mod: CQ

## 2023-09-14 NOTE — PROGRESS NOTES
[facility]  Physical Therapy Treatment Note    Name: Sadie Martinez  Clinic Number: [id number]    Therapy Diagnosis:   Encounter Diagnoses   Name Primary?    Decreased range of motion (ROM) of right knee Yes    Weakness of right lower extremity     Status post total right knee replacement     Abnormal gait      Physician: Indra Matos MD    Visit Date: 9/14/2023  Physician Orders: PT Eval and Treat  Medical Diagnosis from Referral: Z96.651 (ICD-10-CM) - Status post total right knee replacement  Evaluation Date: 8/7/2023  Authorization Period Expiration: Only initial evaluation approved by [facility]. Subsequent visits require prior authorization.   Plan of Care Expiration: 9/29/2023  Visit # / Visits authorized: 11/13  PTA Visit: 1/6    Time In: 0926  Time Out: 1015  Total Billable Time: 49 minutes    Precautions: Functional Level Prior to Evaluation: Independent with pain and mild limitations    Subjective     Pt reports: her knee has minimal pain today, but otherwise she is doing well  Pleasant, encouraged with overall improvements    She was not compliant with home exercise program.  Response to previous treatment: Completed swing bed program    Pain: 2/10  Location: right knee      Objective     Sadie received therapeutic exercises to develop strength, endurance, ROM, and flexibility for 24 minutes including    NuStep: x 5 minutes  Calf stretch on slant board: x 2 minutes  Heel Slides with green strap for overpressure: 15 reps x 5 sec hold  Hamstring Sets with 90/90 maneuver and with green strap: x 20 reps  Seated knee flex with Red T band resistance: 2 x 10 reps    Sadie received the following manual therapy techniques: Soft tissue Mobilization were applied to the: right knee  for 0  minutes, including:    Soft tissue mobilization, patellar mobs manual stretch,     Sadie participated in neuromuscular re-education activities to improve: Balance and Posture for 15 minutes. The  "following activities were included:    Right quad set and straight leg raise for assessment of quad contraction, control, and strength    Quad sets (right): 2 x 10 reps  Straight leg raises (right): 2 x 10 reps  Prone terminal knee extensions 2 sets x 10 reps  Prone knee flexion: 2 x 10 reps  Standing calf raises: 2 x 10 reps    Sadie participated in dynamic functional therapeutic activities to improve functional performance for 10 minutes     Chair squats: x 20 reps  4" forward step ups: x 20 reps  4" lateral step lowers: x 15 reps    Sadie participated in gait training to improve functional mobility and safety for 0 minutes, including:  Gait pattern with equal stride length, initial contact with heel , equal stance time     Sadie received cold pack to right knee for 0 minutes to decrease pain and edema.     Right Knee AROM   Extension 5 degrees   Flexion (Supine) 102 degrees       Home Exercises Provided and Patient Education Provided     Education provided: Review of home Program    Written Home Exercises Provided: yes. Exercises were reviewed and Sadie was able to demonstrate them prior to the end of the session.  Sadie demonstrated good  understanding of the education provided.     See EMR under Patient Instructions for exercises provided prior visit.    Assessment     Patient able to perform all exercises with minimal complaint of increase in pain. Patient with good tolerance of added step lowers and Straight leg raises. Patient did not want cold pack post treatment today, stating she would do it at home.  Sadie isprogressing well towards her goals.   Pt prognosis is Good. Good for stated goals    Pt will continue to benefit from skilled outpatient physical therapy to address the deficits listed in the problem list box on initial evaluation, provide pt/family education, and to maximize pt's level of independence in the home and community environment.     Pt's spiritual, cultural, and educational needs considered and " pt agreeable to plan of care and goals.     Anticipated barriers to physical therapy: transportation    Goals:    Short Term Goals:  Patient will demonstrate independence with home exercise program to ensure carryover of treatment.  Patient will perform sit to/from stand with standby assist without upper extremity support to improve independence and safety with transfers.  Patient will improve right lower extremity strength to 4-/5 to improve independence and safety with bed mobility, transfers, and gait.  Patient will ambulate 150 feet without assistive device with standby assist with normal gait mechanics to improve independence and safety with gait.      Long Term Goals:  Patient will perform sit to/from stand with independence without upper extremity support to improve independence and safety with transfers.  Patient will improve right lower extremity strength to 4+/5 to improve independence and safety with bed mobility, transfers, and gait.  Patient will ambulate 300 feet without assistive device with independence with normal gait mechanics including up/down curbs and ramps to improve independence and safety with community ambulation.    Plan     Patient will continue to benefit from skilled physical therapy treatment as prescribed working towards goals listed above to maximize functional potential.       Freddy Johnson, KEVIN  9/14/2023

## 2023-09-18 DIAGNOSIS — Z96.651 STATUS POST TOTAL RIGHT KNEE REPLACEMENT: Primary | ICD-10-CM

## 2023-09-19 ENCOUNTER — CLINICAL SUPPORT (OUTPATIENT)
Dept: REHABILITATION | Facility: HOSPITAL | Age: 58
End: 2023-09-19
Attending: ORTHOPAEDIC SURGERY
Payer: MEDICARE

## 2023-09-19 DIAGNOSIS — M25.661 DECREASED RANGE OF MOTION (ROM) OF RIGHT KNEE: Primary | ICD-10-CM

## 2023-09-19 DIAGNOSIS — Z96.651 STATUS POST TOTAL RIGHT KNEE REPLACEMENT: ICD-10-CM

## 2023-09-19 DIAGNOSIS — R29.898 WEAKNESS OF RIGHT LOWER EXTREMITY: ICD-10-CM

## 2023-09-19 DIAGNOSIS — R26.9 ABNORMAL GAIT: ICD-10-CM

## 2023-09-19 PROCEDURE — 97112 NEUROMUSCULAR REEDUCATION: CPT | Mod: CQ

## 2023-09-19 PROCEDURE — 97530 THERAPEUTIC ACTIVITIES: CPT | Mod: CQ

## 2023-09-19 PROCEDURE — 97110 THERAPEUTIC EXERCISES: CPT | Mod: CQ

## 2023-09-19 NOTE — PROGRESS NOTES
[facility]  Physical Therapy Treatment Note    Name: Sadie Martinez  Clinic Number: [id number]    Therapy Diagnosis:   Encounter Diagnoses   Name Primary?    Decreased range of motion (ROM) of right knee Yes    Weakness of right lower extremity     Status post total right knee replacement     Abnormal gait      Physician: Indra Matos MD    Visit Date: 9/19/2023  Physician Orders: PT Eval and Treat  Medical Diagnosis from Referral: Z96.651 (ICD-10-CM) - Status post total right knee replacement  Evaluation Date: 8/7/2023  Authorization Period Expiration: 9/29/2023   Plan of Care Expiration: 9/29/2023  Visit # / Visits authorized: 12/13  PTA Visit: 2/6    Time In: 0931  Time Out: 1017  Total Billable Time: 46 minutes    Precautions: Functional Level Prior to Evaluation: Independent with pain and mild limitations    Subjective     Pt reports: her right knee is feeling tight, but no pain noted    She was not compliant with home exercise program.    Pain: 0/10  Location: right knee      Objective     Sadie received therapeutic exercises to develop strength, endurance, ROM, and flexibility for 21 minutes including    NuStep: x 5 minutes  Calf stretch on slant board: x 2 minutes  Heel Slides with green strap for overpressure: 15 reps x 5 sec hold  Hamstring Sets with 90/90 maneuver and with green strap: x 20 reps  Seated knee flex with Green T band resistance: 2 x 10 reps    Sadie received the following manual therapy techniques: Soft tissue Mobilization were applied to the: right knee  for 0 minutes, including:    Soft tissue mobilization, patellar mobs manual stretch,     Sadie participated in neuromuscular re-education activities to improve: Balance and Posture for 15 minutes. The following activities were included:    Right quad set and straight leg raise for assessment of quad contraction, control, and strength    Quad sets (right): 2 x 10 reps x 3 sec hold each  Straight leg raises  "(right): 2 x 10 reps  Prone terminal knee extensions 2 sets x 10 reps  Prone knee flexion: 2 x 10 reps  Standing calf raises: 2 x 10 reps  Single leg stance (right): 3 x 20 sec    Sadie participated in dynamic functional therapeutic activities to improve functional performance for 10 minutes     Chair squats: x 20 reps  4" forward step ups: x 20 reps  4" lateral step lowers: x 15 reps    Sadie participated in gait training to improve functional mobility and safety for 0 minutes, including:  Gait pattern with equal stride length, initial contact with heel , equal stance time     Sadie received cold pack to right knee for 0 minutes to decrease pain and edema.     Right Knee AROM   Extension -2 degrees   Flexion (Supine) 104 degrees       Home Exercises Provided and Patient Education Provided     Education provided: Patient educated on the importance of completing skilled physical therapy treatment and home exercise program as prescribed to maximize functional gains.      Written Home Exercises Provided: Patient instructed to cont prior HEP. Exercises were reviewed and Sadie was able to demonstrate them prior to the end of the session.  Sadie demonstrated good  understanding of the education provided.     See EMR under Patient Instructions for exercises provided  during therapy sessions .    Assessment     Patient with good overall effort throughout treatment. Patient able to perform all exercises with no verbal complaint of increase in pain. Patient with improving range of motion and strength. Patient with no new complaints following treatment.    Sadie isprogressing well towards her goals.   Pt prognosis is Good. Good for stated goals    Pt will continue to benefit from skilled outpatient physical therapy to address the deficits listed in the problem list box on initial evaluation, provide pt/family education, and to maximize pt's level of independence in the home and community environment.     Pt's spiritual, cultural, " and educational needs considered and pt agreeable to plan of care and goals.     Anticipated barriers to physical therapy: transportation    Goals:    Short Term Goals:  Patient will demonstrate independence with home exercise program to ensure carryover of treatment.  Patient will perform sit to/from stand with standby assist without upper extremity support to improve independence and safety with transfers.  Patient will improve right lower extremity strength to 4-/5 to improve independence and safety with bed mobility, transfers, and gait.  Patient will ambulate 150 feet without assistive device with standby assist with normal gait mechanics to improve independence and safety with gait.      Long Term Goals:  Patient will perform sit to/from stand with independence without upper extremity support to improve independence and safety with transfers.  Patient will improve right lower extremity strength to 4+/5 to improve independence and safety with bed mobility, transfers, and gait.  Patient will ambulate 300 feet without assistive device with independence with normal gait mechanics including up/down curbs and ramps to improve independence and safety with community ambulation.    Plan     Patient will continue to benefit from skilled physical therapy treatment as prescribed working towards goals listed above to maximize functional potential.       Freddy Johnson, KEVIN  9/19/2023

## 2023-09-21 ENCOUNTER — OFFICE VISIT (OUTPATIENT)
Dept: ORTHOPEDICS | Facility: CLINIC | Age: 58
End: 2023-09-21
Payer: MEDICARE

## 2023-09-21 ENCOUNTER — HOSPITAL ENCOUNTER (OUTPATIENT)
Dept: RADIOLOGY | Facility: HOSPITAL | Age: 58
Discharge: HOME | End: 2023-09-21
Attending: ORTHOPAEDIC SURGERY
Payer: MEDICARE

## 2023-09-21 DIAGNOSIS — Z96.651 STATUS POST TOTAL RIGHT KNEE REPLACEMENT: ICD-10-CM

## 2023-09-21 DIAGNOSIS — Z96.651 STATUS POST TOTAL RIGHT KNEE REPLACEMENT: Primary | ICD-10-CM

## 2023-09-21 PROCEDURE — 73562 X-RAY EXAM OF KNEE 3: CPT | Mod: TC,RT

## 2023-09-21 PROCEDURE — 99024 POSTOP FOLLOW-UP VISIT: CPT | Mod: ,,, | Performed by: ORTHOPAEDIC SURGERY

## 2023-09-21 PROCEDURE — 1159F PR MEDICATION LIST DOCUMENTED IN MEDICAL RECORD: ICD-10-PCS | Mod: CPTII,,, | Performed by: ORTHOPAEDIC SURGERY

## 2023-09-21 PROCEDURE — 3044F PR MOST RECENT HEMOGLOBIN A1C LEVEL <7.0%: ICD-10-PCS | Mod: CPTII,,, | Performed by: ORTHOPAEDIC SURGERY

## 2023-09-21 PROCEDURE — 73562 X-RAY EXAM OF KNEE 3: CPT | Mod: 26,RT,, | Performed by: ORTHOPAEDIC SURGERY

## 2023-09-21 PROCEDURE — 73562 XR KNEE 3 VIEW RIGHT: ICD-10-PCS | Mod: 26,RT,, | Performed by: ORTHOPAEDIC SURGERY

## 2023-09-21 PROCEDURE — 3044F HG A1C LEVEL LT 7.0%: CPT | Mod: CPTII,,, | Performed by: ORTHOPAEDIC SURGERY

## 2023-09-21 PROCEDURE — 1159F MED LIST DOCD IN RCRD: CPT | Mod: CPTII,,, | Performed by: ORTHOPAEDIC SURGERY

## 2023-09-21 PROCEDURE — 99213 OFFICE O/P EST LOW 20 MIN: CPT | Mod: PBBFAC | Performed by: ORTHOPAEDIC SURGERY

## 2023-09-21 PROCEDURE — 99024 PR POST-OP FOLLOW-UP VISIT: ICD-10-PCS | Mod: ,,, | Performed by: ORTHOPAEDIC SURGERY

## 2023-09-21 NOTE — PROGRESS NOTES
Post-Operative Total Knee        No surgery found No surgery found      Pt is here today for Third post-operative followup of her No surgery found.      she is doing well.  She states some days are better than others.   She doers reports that her ROM is continuing to get better.   She is still doing PT at [facility] in [place]. She feels that PT is going well.     We have reviewed her findings and discussed plan of care and future treatment options, including the physical therapy plan.        Physical Exam:     The affected knee was inspected today.   The wound is healing well with no signs of erythema or warmth.  There is no drainage.  No clinical signs or symptoms of infection are present.   ROM: 0-110  -Mayur's sign.  2+ pulses are present.     X-Ray Knee 3 View Right    Result Date: 9/21/2023  See Procedure Notes for results. IMPRESSION: Please see Ortho procedure notes for report.  This procedure was auto-finalized by: Virtual Radiologist   Three views right knee demonstrate stable appearing total knee prosthesis that is uncemented it appears to be in excellent alignment with no adverse interval change    Assessment and Plan  There are no diagnoses linked to this encounter.          We will continue post-operative physical therapy until the patient feels that they are independent with a home rehab program.  Return to work status/ return to activities status was discussed today in detail. All questions were answered.    The use of stockings and DVT prophylaxis was discussed.  Will follow up in additional 12 weeks with radiographs at that time.   See back in 3 months  There are no Patient Instructions on file for this visit.

## 2023-09-22 ENCOUNTER — CLINICAL SUPPORT (OUTPATIENT)
Dept: REHABILITATION | Facility: HOSPITAL | Age: 58
End: 2023-09-22
Attending: ORTHOPAEDIC SURGERY
Payer: MEDICARE

## 2023-09-22 DIAGNOSIS — M25.661 DECREASED RANGE OF MOTION (ROM) OF RIGHT KNEE: Primary | ICD-10-CM

## 2023-09-22 DIAGNOSIS — R29.898 WEAKNESS OF RIGHT LOWER EXTREMITY: ICD-10-CM

## 2023-09-22 DIAGNOSIS — R26.9 ABNORMAL GAIT: ICD-10-CM

## 2023-09-22 DIAGNOSIS — Z96.651 STATUS POST TOTAL RIGHT KNEE REPLACEMENT: ICD-10-CM

## 2023-09-22 PROCEDURE — 97112 NEUROMUSCULAR REEDUCATION: CPT

## 2023-09-22 PROCEDURE — 97110 THERAPEUTIC EXERCISES: CPT

## 2023-09-22 NOTE — PLAN OF CARE
[facility]  Physical Therapy Discharge Summary    Name: Sadie Martinez  Clinic Number: [id number]    Therapy Diagnosis:   Encounter Diagnoses   Name Primary?    Decreased range of motion (ROM) of right knee Yes    Weakness of right lower extremity     Status post total right knee replacement     Abnormal gait      Physician: Indra Matos MD    Visit Date: 9/22/2023  Physician Orders: PT Eval and Treat  Medical Diagnosis from Referral: Z96.651 (ICD-10-CM) - Status post total right knee replacement  Evaluation Date: 8/7/2023  Authorization Period Expiration: 9/29/2023   Plan of Care Expiration: 9/29/2023  Visit # / Visits authorized: 13/13  PTA Visit: 0    Time In:0932  Time Out: 1015  Total Billable Time: 30 minutes (concurrent treatment)    Precautions: Standard, Diabetes, blood thinners, Fall, pacemaker, and cancer (breast x 4 years ago; in remission)    Subjective     Pt reports: She saw Dr. Indra Matos yesterday who reported she is doing well and encouraged her to complete her episode of physical therapy treatment and then continue working on her right knee flexion via a home exercise program.     She was not compliant with home exercise program.    Pain: 0/10  Location: right knee      Objective     Sadie received therapeutic exercises to develop strength, endurance, ROM, and flexibility for 15 minutes including    NuStep: x 5 minutes  Calf stretch on slant board: x 2 minutes  Hamstring stretch on step (right): 3 x 20 second holds  Right knee flexion stretch on step: x 10 reps with 10 second holds  Heel Slides with green strap for overpressure: 15 reps x 5 sec hold    Sadie participated in neuromuscular re-education activities to improve: Balance and Posture for 15 minutes. The following activities were included:    Quad sets (right): 2 x 10 reps with 3 second holds each  Short arc quads (right): 3 lbs; 2 x 10 reps  Straight leg raises (right): 2 lbs; 2 x 10 reps  Standing calf  raises: 2 x 10 reps  Single leg stance (right): 3 x 20 sec     Right Knee AROM   Extension 0 degrees   Flexion (Supine) 105 degrees     Quad la degrees on right    Home Exercises Provided and Patient Education Provided     Education provided: Patient educated on the importance of continuing daily completion of her home exercise program with an emphasis on improving right knee flexion.     Written Home Exercises Provided: Patient instructed to cont prior HEP. Exercises were reviewed and Sadie was able to demonstrate them prior to the end of the session. Sadie demonstrated good  understanding of the education provided.     See EMR under Patient Instructions for exercises provided during therapy sessions.    Assessment     Patient with good effort throughout treatment. Patient has progressed well with skilled physical therapy treatment, met all of her goals, and has used the visits authorized by her insurance company. Patient continues to demonstrate decreased right knee flexion but was encouraged to continue working on improving this range of motion at home daily via her home exercise program.     Pt's spiritual, cultural, and educational needs considered and pt agreeable to plan of care and goals.    Goals:    Short Term Goals:  Patient will demonstrate independence with home exercise program to ensure carryover of treatment. [Met]  Patient will perform sit to/from stand with standby assist without upper extremity support to improve independence and safety with transfers. [Met]  Patient will improve right lower extremity strength to 4-/5 to improve independence and safety with bed mobility, transfers, and gait. [Met]  Patient will ambulate 150 feet without assistive device with standby assist with normal gait mechanics to improve independence and safety with gait. [Met]     Long Term Goals:  Patient will perform sit to/from stand with independence without upper extremity support to improve independence and safety  with transfers. [Met]  Patient will improve right lower extremity strength to 4+/5 to improve independence and safety with bed mobility, transfers, and gait. [Met]  Patient will ambulate 300 feet without assistive device with independence with normal gait mechanics including up/down curbs and ramps to improve independence and safety with community ambulation. [Met]    Discharge reason: Patient has completed allowable visits authorized by insurance    Plan     This patient is discharged from Physical Therapy.      Petey Mistry, PT, DPT  9/22/2023

## 2023-10-03 ENCOUNTER — TELEPHONE (OUTPATIENT)
Dept: ORTHOPEDICS | Facility: CLINIC | Age: 58
End: 2023-10-03
Payer: MEDICARE

## 2023-10-03 NOTE — TELEPHONE ENCOUNTER
----- Message from Victorina Curry sent at 10/3/2023  1:08 PM CDT -----  For Noah Pt wants to know if you are finished completing form. Please call [phone]

## 2023-12-18 DIAGNOSIS — Z96.651 STATUS POST TOTAL RIGHT KNEE REPLACEMENT: Primary | ICD-10-CM

## 2023-12-21 ENCOUNTER — HOSPITAL ENCOUNTER (OUTPATIENT)
Dept: RADIOLOGY | Facility: HOSPITAL | Age: 58
Discharge: HOME | End: 2023-12-21
Attending: ORTHOPAEDIC SURGERY
Payer: MEDICARE

## 2023-12-21 ENCOUNTER — OFFICE VISIT (OUTPATIENT)
Dept: ORTHOPEDICS | Facility: CLINIC | Age: 58
End: 2023-12-21
Payer: MEDICARE

## 2023-12-21 DIAGNOSIS — Z96.651 STATUS POST TOTAL RIGHT KNEE REPLACEMENT: Primary | ICD-10-CM

## 2023-12-21 DIAGNOSIS — Z96.651 STATUS POST TOTAL RIGHT KNEE REPLACEMENT: ICD-10-CM

## 2023-12-21 PROCEDURE — 3044F HG A1C LEVEL LT 7.0%: CPT | Mod: CPTII,,, | Performed by: ORTHOPAEDIC SURGERY

## 2023-12-21 PROCEDURE — 99212 OFFICE O/P EST SF 10 MIN: CPT | Mod: S$PBB,,, | Performed by: ORTHOPAEDIC SURGERY

## 2023-12-21 PROCEDURE — 1159F MED LIST DOCD IN RCRD: CPT | Mod: CPTII,,, | Performed by: ORTHOPAEDIC SURGERY

## 2023-12-21 PROCEDURE — 1160F RVW MEDS BY RX/DR IN RCRD: CPT | Mod: CPTII,,, | Performed by: ORTHOPAEDIC SURGERY

## 2023-12-21 PROCEDURE — 73562 X-RAY EXAM OF KNEE 3: CPT | Mod: 26,RT,, | Performed by: ORTHOPAEDIC SURGERY

## 2023-12-21 PROCEDURE — 73562 X-RAY EXAM OF KNEE 3: CPT | Mod: TC,RT

## 2023-12-21 PROCEDURE — 99213 OFFICE O/P EST LOW 20 MIN: CPT | Mod: PBBFAC | Performed by: ORTHOPAEDIC SURGERY

## 2023-12-21 PROCEDURE — 99212 PR OFFICE/OUTPT VISIT, EST, LEVL II, 10-19 MIN: ICD-10-PCS | Mod: S$PBB,,, | Performed by: ORTHOPAEDIC SURGERY

## 2023-12-21 PROCEDURE — 3044F PR MOST RECENT HEMOGLOBIN A1C LEVEL <7.0%: ICD-10-PCS | Mod: CPTII,,, | Performed by: ORTHOPAEDIC SURGERY

## 2023-12-21 PROCEDURE — 1159F PR MEDICATION LIST DOCUMENTED IN MEDICAL RECORD: ICD-10-PCS | Mod: CPTII,,, | Performed by: ORTHOPAEDIC SURGERY

## 2023-12-21 PROCEDURE — 73562 XR KNEE 3 VIEW RIGHT: ICD-10-PCS | Mod: 26,RT,, | Performed by: ORTHOPAEDIC SURGERY

## 2023-12-21 PROCEDURE — 1160F PR REVIEW ALL MEDS BY PRESCRIBER/CLIN PHARMACIST DOCUMENTED: ICD-10-PCS | Mod: CPTII,,, | Performed by: ORTHOPAEDIC SURGERY

## 2023-12-21 NOTE — PROGRESS NOTES
58 y.o. Female returns to clinic for a follow up visit regarding     ICD-10-CM ICD-9-CM   1. Status post total right knee replacement  Z96.651 V43.65        Patient is 5 1/2 months post-op.   She states her knee is not really bothering her at this time. She does reports stiffness from time to time.          Past Medical History:   Diagnosis Date    Arthritis     Breast cancer     Left breast mastectomy 4/16/2020    Cancer     Diabetes mellitus, type 2     Hypertension      Past Surgical History:   Procedure Laterality Date    ARTHROPLASTY, KNEE, TOTAL, USING COMPUTER-ASSISTED NAVIGATION Right 6/26/2023    Procedure: ARTHROPLASTY, KNEE, TOTAL, USING COMPUTER-ASSISTED NAVIGATION;  Surgeon: Indra Matos MD;  Location: [facility] OR;  Service: Orthopedics;  Laterality: Right;    ARTHROSCOPIC CHONDROPLASTY OF KNEE JOINT Right 9/8/2021    Procedure: ARTHROSCOPY, KNEE, WITH CHONDROPLASTY;  Surgeon: Indra Matos MD;  Location: [facility] OR;  Service: Orthopedics;  Laterality: Right;    BACK SURGERY      CERVICAL BIOPSY  W/ LOOP ELECTRODE EXCISION      HYSTERECTOMY      KNEE ARTHROSCOPY W/ MENISCECTOMY Right 9/8/2021    Procedure: ARTHROSCOPY, KNEE, WITH MENISCECTOMY;  Surgeon: Indra Matos MD;  Location: [facility] OR;  Service: Orthopedics;  Laterality: Right;    KNEE ARTHROSCOPY W/ PLICA EXCISION Right 9/8/2021    Procedure: EXCISION, PLICA, KNEE, ARTHROSCOPIC;  Surgeon: Indra Matos MD;  Location: [facility] OR;  Service: Orthopedics;  Laterality: Right;    KNEE JOINT MANIPULATION Right 6/28/2023    Procedure: MANIPULATION, KNEE;  Surgeon: Indra Matos MD;  Location: [facility] OR;  Service: Orthopedics;  Laterality: Right;    MASTECTOMY, PARTIAL Left     TUBAL LIGATION           PHYSICAL EXAMINATION:    Ortho/SPM Exam  Range of motion 0/0/100 20°.  Stable throughout the arc of motion well-healed surgical incision    IMAGING:  X-Ray Knee 3 View Right    Result Date: 12/21/2023  See Procedure  Notes for results. IMPRESSION: Please see Ortho procedure notes for report.  This procedure was auto-finalized by: Virtual Radiologist     Three views right knee demonstrate a cementless knee arthroplasty in excellent position with no adverse interval change  ASSESSMENT:      ICD-10-CM ICD-9-CM   1. Status post total right knee replacement  Z96.651 V43.65       PLAN:     -Findings and treatment options were discussed with the patient  -All questions answered      Doing well.  Continue activity as tolerated will see back in 6 months    There are no Patient Instructions on file for this visit.      No orders of the defined types were placed in this encounter.        Procedures

## 2024-01-30 ENCOUNTER — OFFICE VISIT (OUTPATIENT)
Dept: FAMILY MEDICINE | Facility: CLINIC | Age: 59
End: 2024-01-30
Payer: MEDICARE

## 2024-01-30 VITALS
RESPIRATION RATE: 16 BRPM | TEMPERATURE: 99 F | WEIGHT: 164 LBS | OXYGEN SATURATION: 99 % | SYSTOLIC BLOOD PRESSURE: 129 MMHG | HEART RATE: 89 BPM | HEIGHT: 62 IN | DIASTOLIC BLOOD PRESSURE: 77 MMHG | BODY MASS INDEX: 30.18 KG/M2

## 2024-01-30 DIAGNOSIS — Z20.822 COVID-19 RULED OUT BY LABORATORY TESTING: ICD-10-CM

## 2024-01-30 DIAGNOSIS — Z20.822 LAB TEST NEGATIVE FOR COVID-19 VIRUS: Primary | ICD-10-CM

## 2024-01-30 LAB
CTP QC/QA: YES
SARS-COV-2 AG RESP QL IA.RAPID: NEGATIVE

## 2024-01-30 PROCEDURE — 87426 SARSCOV CORONAVIRUS AG IA: CPT | Mod: QW,,, | Performed by: FAMILY MEDICINE

## 2024-01-30 PROCEDURE — 3008F BODY MASS INDEX DOCD: CPT | Mod: ,,, | Performed by: FAMILY MEDICINE

## 2024-01-30 PROCEDURE — 1159F MED LIST DOCD IN RCRD: CPT | Mod: ,,, | Performed by: FAMILY MEDICINE

## 2024-01-30 PROCEDURE — 99213 OFFICE O/P EST LOW 20 MIN: CPT | Mod: GC,,, | Performed by: FAMILY MEDICINE

## 2024-01-30 PROCEDURE — 3074F SYST BP LT 130 MM HG: CPT | Mod: ,,, | Performed by: FAMILY MEDICINE

## 2024-01-30 PROCEDURE — 3078F DIAST BP <80 MM HG: CPT | Mod: ,,, | Performed by: FAMILY MEDICINE

## 2024-01-30 RX ORDER — HYDROCODONE BITARTRATE AND ACETAMINOPHEN 10; 325 MG/1; MG/1
1 TABLET ORAL EVERY 8 HOURS PRN
COMMUNITY

## 2024-01-30 RX ORDER — FERROUS SULFATE TAB 325 MG (65 MG ELEMENTAL FE) 325 (65 FE) MG
TAB ORAL 2 TIMES DAILY
COMMUNITY
Start: 2023-11-06

## 2024-01-30 NOTE — ASSESSMENT & PLAN NOTE
Patient requested Covid test - negative  No direct exposure  Asymptomatic   Would be at higher risk due to comorbidities

## 2024-01-30 NOTE — PROGRESS NOTES
Subjective:       Patient ID: Sadie Martinez is a 58 y.o. female.    Chief Complaint: COVID-19 Concerns (ASYMPTOMATIC, NO KNOWN EXPOSURE, JUST WANTS TEST)    Patient is a 58 year old female that presents to the clinic for a covid rule out testing. She has no symptoms and states that she was not directly exposed to Covid-19. She denies headache, SOB or chest pain. She states that she performs all ADLs without issue. Covid negative by lab results. Patient can refer back to her own PCP afterward.         Current Outpatient Medications:     acetaminophen (TYLENOL) 325 MG tablet, Take 2 tablets (650 mg total) by mouth every 4 (four) hours as needed., Disp: , Rfl: 0    amLODIPine (NORVASC) 5 MG tablet, Take 5 mg by mouth once daily., Disp: , Rfl:     anastrozole (ARIMIDEX) 1 mg Tab, Take 1 mg by mouth once daily., Disp: , Rfl:     cyclobenzaprine (FLEXERIL) 10 MG tablet, TAKE 1 TABLET BY MOUTH AT BEDTIME AS NEEDED FOR MUSCLE PAIN, Disp: 10 tablet, Rfl: 0    FEROSUL 325 mg (65 mg iron) Tab tablet, Take by mouth 2 (two) times daily., Disp: , Rfl:     gabapentin (NEURONTIN) 300 MG capsule, Take 300 mg by mouth 3 (three) times daily., Disp: , Rfl:     glipiZIDE (GLUCOTROL) 10 MG tablet, TAKE 1 TABLET BY MOUTH EVERY DAY BEFORE A MEAL, Disp: , Rfl:     hydroCHLOROthiazide (HYDRODIURIL) 25 MG tablet, Take 25 mg by mouth once daily., Disp: , Rfl:     HYDROcodone-acetaminophen (NORCO)  mg per tablet, Take 1 tablet by mouth every 8 (eight) hours as needed., Disp: , Rfl:     metFORMIN (GLUCOPHAGE) 1000 MG tablet, Take 1,000 mg by mouth 2 (two) times daily with meals., Disp: , Rfl:     rosuvastatin (CRESTOR) 10 MG tablet, Take 10 mg by mouth once daily., Disp: , Rfl:     aspirin (ECOTRIN) 81 MG EC tablet, Take 1 tablet (81 mg total) by mouth 2 (two) times a day., Disp: 60 tablet, Rfl: 0    calcium carbonate (OS-WILLIAM) 600 mg calcium (1,500 mg) Tab, Take 1 tablet (600 mg total) by mouth once daily., Disp: 30 tablet,  Rfl: 0    ondansetron (ZOFRAN-ODT) 4 MG TbDL, Take 1 tablet (4 mg total) by mouth every 6 (six) hours as needed (nausea). (Patient not taking: Reported on 7/13/2023), Disp: 30 tablet, Rfl: 0    oxyCODONE-acetaminophen (PERCOCET)  mg per tablet, Take 1 tablet by mouth every 6 (six) hours as needed for Pain. (Patient not taking: Reported on 1/30/2024), Disp: 30 tablet, Rfl: 0    Review of patient's allergies indicates:   Allergen Reactions    Mobic [meloxicam] Swelling       Past Medical History:   Diagnosis Date    Arthritis     Breast cancer     Left breast mastectomy 4/16/2020    Cancer     Diabetes mellitus, type 2     Hypertension        Past Surgical History:   Procedure Laterality Date    ARTHROPLASTY, KNEE, TOTAL, USING COMPUTER-ASSISTED NAVIGATION Right 6/26/2023    Procedure: ARTHROPLASTY, KNEE, TOTAL, USING COMPUTER-ASSISTED NAVIGATION;  Surgeon: Indra Matos MD;  Location: [facility];  Service: Orthopedics;  Laterality: Right;    ARTHROSCOPIC CHONDROPLASTY OF KNEE JOINT Right 9/8/2021    Procedure: ARTHROSCOPY, KNEE, WITH CHONDROPLASTY;  Surgeon: Indra Matos MD;  Location: [facility] OR;  Service: Orthopedics;  Laterality: Right;    BACK SURGERY      CERVICAL BIOPSY  W/ LOOP ELECTRODE EXCISION      HYSTERECTOMY      KNEE ARTHROSCOPY W/ MENISCECTOMY Right 9/8/2021    Procedure: ARTHROSCOPY, KNEE, WITH MENISCECTOMY;  Surgeon: Indra Matos MD;  Location: [facility] OR;  Service: Orthopedics;  Laterality: Right;    KNEE ARTHROSCOPY W/ PLICA EXCISION Right 9/8/2021    Procedure: EXCISION, PLICA, KNEE, ARTHROSCOPIC;  Surgeon: Indra Matos MD;  Location: [facility] OR;  Service: Orthopedics;  Laterality: Right;    KNEE JOINT MANIPULATION Right 6/28/2023    Procedure: MANIPULATION, KNEE;  Surgeon: Indra Matos MD;  Location: [facility] OR;  Service: Orthopedics;  Laterality: Right;    MASTECTOMY, PARTIAL Left     TUBAL LIGATION         Family History   Problem  Relation Age of Onset    Diabetes Mother     Heart attack Mother     Hypertension Mother     Stroke Father     Dementia Father     Stomach cancer Maternal Uncle     Pancreatic cancer Maternal Uncle        Social History     Tobacco Use    Smoking status: Never    Smokeless tobacco: Never   Substance Use Topics    Alcohol use: Not Currently    Drug use: Never       Review of Systems   Constitutional:  Negative for activity change, appetite change, chills and fatigue.   HENT:  Negative for nasal congestion, hearing loss, mouth dryness, mouth sores, nosebleeds, postnasal drip, rhinorrhea, sinus pressure/congestion, sneezing, sore throat and goiter.    Respiratory:  Negative for choking, shortness of breath, wheezing and stridor.    Cardiovascular:  Negative for palpitations, leg swelling and claudication.   Gastrointestinal:  Negative for abdominal distention, abdominal pain and anal bleeding.   Genitourinary:  Negative for frequency, hematuria and nocturia.   Neurological:  Negative for facial asymmetry, light-headedness, headaches and memory loss.   All other systems reviewed and are negative.        Current Medications:   Medication List with Changes/Refills   Current Medications    ACETAMINOPHEN (TYLENOL) 325 MG TABLET    Take 2 tablets (650 mg total) by mouth every 4 (four) hours as needed.       Start Date: 7/12/2023 End Date: --    AMLODIPINE (NORVASC) 5 MG TABLET    Take 5 mg by mouth once daily.       Start Date: 3/26/2021 End Date: --    ANASTROZOLE (ARIMIDEX) 1 MG TAB    Take 1 mg by mouth once daily.       Start Date: 3/30/2021 End Date: --    ASPIRIN (ECOTRIN) 81 MG EC TABLET    Take 1 tablet (81 mg total) by mouth 2 (two) times a day.       Start Date: 6/27/2023 End Date: 7/27/2023    CALCIUM CARBONATE (OS-WILLIAM) 600 MG CALCIUM (1,500 MG) TAB    Take 1 tablet (600 mg total) by mouth once daily.       Start Date: 7/12/2023 End Date: 8/11/2023    CYCLOBENZAPRINE (FLEXERIL) 10 MG TABLET    TAKE 1  "TABLET BY MOUTH AT BEDTIME AS NEEDED FOR MUSCLE PAIN       Start Date: 7/12/2023 End Date: --    FEROSUL 325 MG (65 MG IRON) TAB TABLET    Take by mouth 2 (two) times daily.       Start Date: 11/6/2023 End Date: --    GABAPENTIN (NEURONTIN) 300 MG CAPSULE    Take 300 mg by mouth 3 (three) times daily.       Start Date: --        End Date: --    GLIPIZIDE (GLUCOTROL) 10 MG TABLET    TAKE 1 TABLET BY MOUTH EVERY DAY BEFORE A MEAL       Start Date: 3/26/2021 End Date: --    HYDROCHLOROTHIAZIDE (HYDRODIURIL) 25 MG TABLET    Take 25 mg by mouth once daily.       Start Date: 3/26/2021 End Date: --    HYDROCODONE-ACETAMINOPHEN (NORCO)  MG PER TABLET    Take 1 tablet by mouth every 8 (eight) hours as needed.       Start Date: --        End Date: --    METFORMIN (GLUCOPHAGE) 1000 MG TABLET    Take 1,000 mg by mouth 2 (two) times daily with meals.       Start Date: --        End Date: --    ONDANSETRON (ZOFRAN-ODT) 4 MG TBDL    Take 1 tablet (4 mg total) by mouth every 6 (six) hours as needed (nausea).       Start Date: 9/8/2021  End Date: --    OXYCODONE-ACETAMINOPHEN (PERCOCET)  MG PER TABLET    Take 1 tablet by mouth every 6 (six) hours as needed for Pain.       Start Date: 6/27/2023 End Date: --    ROSUVASTATIN (CRESTOR) 10 MG TABLET    Take 10 mg by mouth once daily.       Start Date: --        End Date: --            Objective:        Vitals:    01/30/24 1507   BP: 129/77   BP Location: Left arm   Patient Position: Sitting   BP Method: Medium (Automatic)   Pulse: 89   Resp: 16   Temp: 98.6 °F (37 °C)   TempSrc: Oral   SpO2: 99%   Weight: 74.4 kg (164 lb)   Height: 5' 2" (1.575 m)       Physical Exam  Vitals reviewed.   Constitutional:       General: She is awake. She is not in acute distress.     Appearance: Normal appearance. She is well-developed and well-groomed. She is obese. She is not ill-appearing, toxic-appearing or diaphoretic.   HENT:      Head: Normocephalic and atraumatic.   Eyes:      " Extraocular Movements: Extraocular movements intact.      Conjunctiva/sclera: Conjunctivae normal.   Cardiovascular:      Rate and Rhythm: Normal rate and regular rhythm.      Pulses: Normal pulses.      Heart sounds: Normal heart sounds.   Pulmonary:      Effort: Pulmonary effort is normal.      Breath sounds: Normal breath sounds.   Abdominal:      General: Bowel sounds are normal.      Palpations: Abdomen is soft.   Musculoskeletal:      Right lower leg: No edema.      Left lower leg: No edema.   Skin:     General: Skin is warm and dry.   Neurological:      Mental Status: She is alert and oriented to person, place, and time.   Psychiatric:         Mood and Affect: Mood normal.         Behavior: Behavior normal. Behavior is cooperative.         Thought Content: Thought content normal.             Lab Results   Component Value Date    WBC 5.38 07/10/2023    HGB 9.8 (L) 07/10/2023    HCT 30.6 (L) 07/10/2023     (H) 07/10/2023    ALT 17 06/27/2023    AST 16 06/27/2023     07/10/2023    K 3.1 (L) 07/10/2023    CL 99 07/10/2023    CREATININE 1.08 (H) 07/10/2023    BUN 11 07/10/2023    CO2 30 07/10/2023    TSH 0.357 (L) 06/27/2023    HGBA1C 6.2 06/27/2023      Assessment:       1. Lab test negative for COVID-19 virus    2. COVID-19 ruled out by laboratory testing        Plan:         Problem List Items Addressed This Visit          ID    COVID-19 ruled out by laboratory testing     Patient requested Covid test - negative  No direct exposure  Asymptomatic   Would be at higher risk due to comorbidities             Other Visit Diagnoses       Lab test negative for COVID-19 virus    -  Primary    Relevant Orders    SARS Coronavirus 2 Antigen, POCT (Completed)              Follow up if symptoms worsen or fail to improve.    Ezio Landeros MD     Instructed patient that if symptoms fail to improve or worsen patient should seek immediate medical attention or report to the nearest emergency department. Patient  expressed verbal agreement and understanding to this plan of care.

## 2024-06-17 DIAGNOSIS — Z96.651 HISTORY OF TOTAL KNEE ARTHROPLASTY, RIGHT: Primary | ICD-10-CM

## 2024-07-18 ENCOUNTER — HOSPITAL ENCOUNTER (EMERGENCY)
Facility: HOSPITAL | Age: 59
Discharge: HOME | End: 2024-07-18
Payer: MEDICARE

## 2024-07-18 VITALS
TEMPERATURE: 99 F | SYSTOLIC BLOOD PRESSURE: 153 MMHG | DIASTOLIC BLOOD PRESSURE: 84 MMHG | HEART RATE: 79 BPM | OXYGEN SATURATION: 100 % | WEIGHT: 159 LBS | BODY MASS INDEX: 29.26 KG/M2 | RESPIRATION RATE: 18 BRPM | HEIGHT: 62 IN

## 2024-07-18 DIAGNOSIS — L24.9 IRRITANT CONTACT DERMATITIS, UNSPECIFIED TRIGGER: Primary | ICD-10-CM

## 2024-07-18 PROCEDURE — 99284 EMERGENCY DEPT VISIT MOD MDM: CPT | Mod: GF | Performed by: NURSE PRACTITIONER

## 2024-07-18 PROCEDURE — 96372 THER/PROPH/DIAG INJ SC/IM: CPT | Performed by: NURSE PRACTITIONER

## 2024-07-18 PROCEDURE — 99284 EMERGENCY DEPT VISIT MOD MDM: CPT | Mod: 25

## 2024-07-18 PROCEDURE — 63600175 PHARM REV CODE 636 W HCPCS: Performed by: NURSE PRACTITIONER

## 2024-07-18 RX ORDER — MOMETASONE FUROATE 1 MG/G
CREAM TOPICAL DAILY
Qty: 45 G | Refills: 0 | Status: SHIPPED | OUTPATIENT
Start: 2024-07-18

## 2024-07-18 RX ORDER — DEXAMETHASONE SODIUM PHOSPHATE 4 MG/ML
4 INJECTION, SOLUTION INTRA-ARTICULAR; INTRALESIONAL; INTRAMUSCULAR; INTRAVENOUS; SOFT TISSUE
Status: COMPLETED | OUTPATIENT
Start: 2024-07-18 | End: 2024-07-18

## 2024-07-18 RX ORDER — PREDNISONE 20 MG/1
40 TABLET ORAL DAILY
Qty: 10 TABLET | Refills: 0 | Status: SHIPPED | OUTPATIENT
Start: 2024-07-18 | End: 2024-07-23

## 2024-07-18 RX ADMIN — DEXAMETHASONE SODIUM PHOSPHATE 4 MG: 4 INJECTION, SOLUTION INTRA-ARTICULAR; INTRALESIONAL; INTRAMUSCULAR; INTRAVENOUS; SOFT TISSUE at 12:07

## 2024-07-18 NOTE — ED PROVIDER NOTES
Encounter Date: 7/18/2024       History     Chief Complaint   Patient presents with    Rash     Presented with c/o rash and itching that started on 7/14 after working outside weed-eating and pulling weeds. States has been using Calamine lotion but is not improving. Denies nasal congestion/drng, wheezing, or dyspnea.      Review of patient's allergies indicates:   Allergen Reactions    Mobic [meloxicam] Swelling     Past Medical History:   Diagnosis Date    Arthritis     Breast cancer     Left breast mastectomy 4/16/2020    Cancer     Diabetes mellitus, type 2     Hypertension      Past Surgical History:   Procedure Laterality Date    ARTHROPLASTY, KNEE, TOTAL, USING COMPUTER-ASSISTED NAVIGATION Right 6/26/2023    Procedure: ARTHROPLASTY, KNEE, TOTAL, USING COMPUTER-ASSISTED NAVIGATION;  Surgeon: Indra Matos MD;  Location: [facility] OR;  Service: Orthopedics;  Laterality: Right;    ARTHROSCOPIC CHONDROPLASTY OF KNEE JOINT Right 9/8/2021    Procedure: ARTHROSCOPY, KNEE, WITH CHONDROPLASTY;  Surgeon: Indra Matos MD;  Location: [facility] OR;  Service: Orthopedics;  Laterality: Right;    BACK SURGERY      CERVICAL BIOPSY  W/ LOOP ELECTRODE EXCISION      HYSTERECTOMY      KNEE ARTHROSCOPY W/ MENISCECTOMY Right 9/8/2021    Procedure: ARTHROSCOPY, KNEE, WITH MENISCECTOMY;  Surgeon: Indra Matos MD;  Location: [facility] OR;  Service: Orthopedics;  Laterality: Right;    KNEE ARTHROSCOPY W/ PLICA EXCISION Right 9/8/2021    Procedure: EXCISION, PLICA, KNEE, ARTHROSCOPIC;  Surgeon: Indra Matos MD;  Location: [facility] OR;  Service: Orthopedics;  Laterality: Right;    KNEE JOINT MANIPULATION Right 6/28/2023    Procedure: MANIPULATION, KNEE;  Surgeon: Indra Matos MD;  Location: [facility] OR;  Service: Orthopedics;  Laterality: Right;    MASTECTOMY, PARTIAL Left     TUBAL LIGATION       Family History   Problem Relation Name Age of Onset    Diabetes Mother      Heart attack Mother      Hypertension  Mother      Stroke Father      Dementia Father      Stomach cancer Maternal Uncle      Pancreatic cancer Maternal Uncle       Social History     Tobacco Use    Smoking status: Never    Smokeless tobacco: Never   Substance Use Topics    Alcohol use: Not Currently    Drug use: Never     Review of Systems   Constitutional:  Negative for activity change, appetite change and fever.   HENT:  Negative for congestion.    Respiratory:  Negative for cough, shortness of breath and wheezing.    Cardiovascular:  Negative for chest pain.   Gastrointestinal:  Negative for abdominal pain, diarrhea, nausea and vomiting.   Genitourinary: Negative.    Musculoskeletal: Negative.    Skin:  Positive for rash.   Neurological: Negative.  Negative for dizziness and headaches.   Psychiatric/Behavioral: Negative.         Physical Exam     Initial Vitals [07/18/24 1225]   BP Pulse Resp Temp SpO2   (!) 166/111 100 18 98.6 °F (37 °C) 98 %      MAP       --         Physical Exam    Nursing note and vitals reviewed.  Constitutional: She appears well-developed and well-nourished. No distress.   HENT:   Head: Normocephalic.   Right Ear: External ear normal.   Left Ear: External ear normal.   Nose: Nose normal.   Mouth/Throat: Oropharynx is clear and moist.   Eyes: Conjunctivae and EOM are normal. Pupils are equal, round, and reactive to light.   Neck: Neck supple.   Normal range of motion.  Cardiovascular:  Normal rate, regular rhythm, normal heart sounds and intact distal pulses.           No murmur heard.  Pulmonary/Chest: Breath sounds normal. She has no wheezes. She has no rhonchi. She has no rales.   Abdominal: Abdomen is soft. Bowel sounds are normal. There is no abdominal tenderness.   Musculoskeletal:         General: No edema. Normal range of motion.      Cervical back: Normal range of motion and neck supple.     Neurological: She is alert and oriented to person, place, and time. She has normal strength. GCS score is 15. GCS eye subscore is  4. GCS verbal subscore is 5. GCS motor subscore is 6.   Skin: Skin is warm and dry. Rash (scattered erythematous macular/vesicular rash noted to face, chest, and ext) noted.         Medical Screening Exam   See Full Note    ED Course   Procedures  Labs Reviewed - No data to display       Imaging Results    None          Medications   dexAMETHasone injection 4 mg (4 mg Intramuscular Given 7/18/24 9630)     Medical Decision Making  Presented with c/o rash and itching that started on 7/14 after working outside weed-eating and pulling weeds. States has been using Calamine lotion but is not improving. Denies nasal congestion/drng, wheezing, or dyspnea.    Risk  Prescription drug management.  Risk Details: Decadron 4 mg IM. Rx for Prednisone, mometasone. Instructed to monitor glucose, on home care, med use, follow-up and return precautions. Discharged home with detailed written instructions provided.                                        Clinical Impression:   Final diagnoses:  [L24.9] Irritant contact dermatitis, unspecified trigger (Primary)        ED Disposition Condition    Discharge Stable          ED Prescriptions       Medication Sig Dispense Start Date End Date Auth. Provider    predniSONE (DELTASONE) 20 MG tablet Take 2 tablets (40 mg total) by mouth once daily. for 5 days 10 tablet 7/18/2024 7/23/2024 Janelle Javier NP    mometasone 0.1% (ELOCON) 0.1 % cream Apply topically once daily. 45 g 7/18/2024 -- Janelle Javier NP          Follow-up Information       Follow up With Specialties Details Why Contact Info    [facility] - Emergency Department Emergency Medicine  If symptoms worsen [address] [address] [phone]             Janelle Javier NP  07/18/24 4394

## 2024-07-18 NOTE — DISCHARGE INSTRUCTIONS
Take Prednisone as prescribed for up to 5 days. May take for 3 days if improved. Use mometasone cream daily to rash. Also use Caladryl cream/lotion to rash as well. It can be purchased over the counter. As instructed, monitor your glucose at least twice a day since steroid can cause an increase. Maintain a diabetic diet and drink plenty of water. Return to the ED for new or worsening symptoms.

## 2024-07-29 DIAGNOSIS — Z96.651 STATUS POST TOTAL RIGHT KNEE REPLACEMENT: Primary | ICD-10-CM

## 2024-07-29 DIAGNOSIS — Z96.651 HISTORY OF TOTAL KNEE ARTHROPLASTY, RIGHT: Primary | ICD-10-CM

## 2024-07-30 ENCOUNTER — OFFICE VISIT (OUTPATIENT)
Dept: ORTHOPEDICS | Facility: CLINIC | Age: 59
End: 2024-07-30
Payer: MEDICARE

## 2024-07-30 ENCOUNTER — HOSPITAL ENCOUNTER (OUTPATIENT)
Dept: RADIOLOGY | Facility: HOSPITAL | Age: 59
Discharge: HOME | End: 2024-07-30
Attending: ORTHOPAEDIC SURGERY
Payer: MEDICARE

## 2024-07-30 VITALS — HEIGHT: 62 IN | BODY MASS INDEX: 29.26 KG/M2 | WEIGHT: 159 LBS

## 2024-07-30 DIAGNOSIS — Z96.651 STATUS POST TOTAL RIGHT KNEE REPLACEMENT: Primary | ICD-10-CM

## 2024-07-30 DIAGNOSIS — Z96.651 HISTORY OF TOTAL KNEE ARTHROPLASTY, RIGHT: ICD-10-CM

## 2024-07-30 PROCEDURE — 1159F MED LIST DOCD IN RCRD: CPT | Mod: CPTII,,, | Performed by: ORTHOPAEDIC SURGERY

## 2024-07-30 PROCEDURE — 99213 OFFICE O/P EST LOW 20 MIN: CPT | Mod: S$PBB,,, | Performed by: ORTHOPAEDIC SURGERY

## 2024-07-30 PROCEDURE — 3008F BODY MASS INDEX DOCD: CPT | Mod: CPTII,,, | Performed by: ORTHOPAEDIC SURGERY

## 2024-07-30 PROCEDURE — 73562 X-RAY EXAM OF KNEE 3: CPT | Mod: TC,RT

## 2024-07-30 PROCEDURE — 73562 X-RAY EXAM OF KNEE 3: CPT | Mod: 26,RT,, | Performed by: ORTHOPAEDIC SURGERY

## 2024-07-30 PROCEDURE — 99999 PR PBB SHADOW E&M-EST. PATIENT-LVL III: CPT | Mod: PBBFAC,,, | Performed by: ORTHOPAEDIC SURGERY

## 2024-07-30 PROCEDURE — 99213 OFFICE O/P EST LOW 20 MIN: CPT | Mod: PBBFAC,25 | Performed by: ORTHOPAEDIC SURGERY

## 2024-07-30 NOTE — PROGRESS NOTES
"  CC:  Knee pain    59 y.o. Female returns to clinic for a follow up visit regarding knee pain.       Patient is back for her yearly recheck right total knee. States she has done fairly well since surgery.       Past Medical History:   Diagnosis Date    Arthritis     Breast cancer     Left breast mastectomy 4/16/2020    Cancer     Diabetes mellitus, type 2     Hypertension      Past Surgical History:   Procedure Laterality Date    ARTHROPLASTY, KNEE, TOTAL, USING COMPUTER-ASSISTED NAVIGATION Right 6/26/2023    Procedure: ARTHROPLASTY, KNEE, TOTAL, USING COMPUTER-ASSISTED NAVIGATION;  Surgeon: Indra Matos MD;  Location: [facility] OR;  Service: Orthopedics;  Laterality: Right;    ARTHROSCOPIC CHONDROPLASTY OF KNEE JOINT Right 9/8/2021    Procedure: ARTHROSCOPY, KNEE, WITH CHONDROPLASTY;  Surgeon: Indra Matos MD;  Location: [facility] OR;  Service: Orthopedics;  Laterality: Right;    BACK SURGERY      CERVICAL BIOPSY  W/ LOOP ELECTRODE EXCISION      HYSTERECTOMY      KNEE ARTHROSCOPY W/ MENISCECTOMY Right 9/8/2021    Procedure: ARTHROSCOPY, KNEE, WITH MENISCECTOMY;  Surgeon: Indra Matos MD;  Location: [facility] OR;  Service: Orthopedics;  Laterality: Right;    KNEE ARTHROSCOPY W/ PLICA EXCISION Right 9/8/2021    Procedure: EXCISION, PLICA, KNEE, ARTHROSCOPIC;  Surgeon: Indra Matos MD;  Location: [facility] OR;  Service: Orthopedics;  Laterality: Right;    KNEE JOINT MANIPULATION Right 6/28/2023    Procedure: MANIPULATION, KNEE;  Surgeon: Indra Matos MD;  Location: [facility] OR;  Service: Orthopedics;  Laterality: Right;    MASTECTOMY, PARTIAL Left     TUBAL LIGATION           PHYSICAL EXAMINATION:  Ht 5' 2" (1.575 m)   Wt 72.1 kg (159 lb)   BMI 29.08 kg/m²   Ortho/SPM Exam  0-120     IMAGING:  X-Ray Knee 3 View Right    Result Date: 7/30/2024  See Procedure Notes for results. IMPRESSION: Please see Ortho procedure notes for report.  This procedure was auto-finalized by: Virtual " Radiologist     3 Radiographs of the right knee were obtained today.  The prosthesis appears to be in excellent overall alignment.  No evidence of loosening or fracture is demonstrated.  Components appear to be appropriately positioned with no adverse interval change.     ASSESSMENT:    No diagnosis found.    PLAN:     -Findings and treatment options were discussed with the patient  -All questions answered  Natural history and expected course discussed. Questions answered.  Educational materials distributed.  Rest, ice, compression, and elevation (RICE) therapy.   Sane score 80  Pre op 5    There are no Patient Instructions on file for this visit.      No orders of the defined types were placed in this encounter.        Procedures

## 2025-01-10 ENCOUNTER — TELEPHONE (OUTPATIENT)
Dept: ORTHOPEDICS | Facility: CLINIC | Age: 60
End: 2025-01-10
Payer: MEDICARE

## 2025-01-10 NOTE — TELEPHONE ENCOUNTER
Call patient. Someone answer her phone and stated she was not there. I left a message for her to call me back about her appt. Patient need to be taken off Dr. Indra Matos schedule

## 2025-01-16 ENCOUNTER — TELEPHONE (OUTPATIENT)
Dept: ORTHOPEDICS | Facility: CLINIC | Age: 60
End: 2025-01-16
Payer: MEDICARE

## 2025-01-16 NOTE — TELEPHONE ENCOUNTER
PATIENT NEEDS TO BE RESCHEDULED WITH WENDY CARBONE.   IT IS OUR POLICY THAT THE [facility] WILL NOT SEE PATIENTS WITH HARDWARE PUT IN BY ANOTHER DOCTOR.   PATIENT CAN STILL SEE TYSON ABDI FOR HER YEARLY RECHECK, BUT IF AT ANY TIME SHE EXPERIENCES PROBLEMS WITH HER RIGHT TOTAL KNEE SHE WILL NEED TO SEE DR. TONI MERCADO TO ADDRESS ANY ISSUES SINCE HE IS THE ONE WHO DID HER SURGERY.

## 2025-02-17 NOTE — SUBJECTIVE & OBJECTIVE
Past Medical History:   Diagnosis Date    Arthritis     Breast cancer     Left breast mastectomy 4/16/2020    Cancer     Diabetes mellitus, type 2     Hypertension        Past Surgical History:   Procedure Laterality Date    ARTHROPLASTY, KNEE, TOTAL, USING COMPUTER-ASSISTED NAVIGATION Right 6/26/2023    Procedure: ARTHROPLASTY, KNEE, TOTAL, USING COMPUTER-ASSISTED NAVIGATION;  Surgeon: Indra Matos MD;  Location: [facility];  Service: Orthopedics;  Laterality: Right;    ARTHROSCOPIC CHONDROPLASTY OF KNEE JOINT Right 9/8/2021    Procedure: ARTHROSCOPY, KNEE, WITH CHONDROPLASTY;  Surgeon: Indra Matos MD;  Location: [facility] OR;  Service: Orthopedics;  Laterality: Right;    BACK SURGERY      CERVICAL BIOPSY  W/ LOOP ELECTRODE EXCISION      HYSTERECTOMY      KNEE ARTHROSCOPY W/ MENISCECTOMY Right 9/8/2021    Procedure: ARTHROSCOPY, KNEE, WITH MENISCECTOMY;  Surgeon: Indra Matos MD;  Location: [facility] OR;  Service: Orthopedics;  Laterality: Right;    KNEE ARTHROSCOPY W/ PLICA EXCISION Right 9/8/2021    Procedure: EXCISION, PLICA, KNEE, ARTHROSCOPIC;  Surgeon: Indra Matos MD;  Location: [facility];  Service: Orthopedics;  Laterality: Right;    MASTECTOMY, PARTIAL Left     TUBAL LIGATION         Review of patient's allergies indicates:   Allergen Reactions    Mobic [meloxicam] Swelling       No current facility-administered medications on file prior to encounter.     Current Outpatient Medications on File Prior to Encounter   Medication Sig    amLODIPine (NORVASC) 5 MG tablet Take 5 mg by mouth once daily.    anastrozole (ARIMIDEX) 1 mg Tab Take 1 mg by mouth once daily.    calcium carbonate (OS-WILLIAM) 600 mg calcium (1,500 mg) Tab Take 600 mg by mouth once daily.    cyclobenzaprine (FLEXERIL) 10 MG tablet TAKE 1 TABLET BY MOUTH AT BEDTIME AS NEEDED FOR MUSCLE PAIN    gabapentin (NEURONTIN) 300 MG capsule Take 300 mg by mouth 3 (three) times daily.    glipiZIDE (GLUCOTROL) 10 MG tablet TAKE  1 TABLET BY MOUTH EVERY DAY BEFORE A MEAL    hydroCHLOROthiazide (HYDRODIURIL) 25 MG tablet Take 25 mg by mouth once daily.    metFORMIN (GLUCOPHAGE) 1000 MG tablet Take 1,000 mg by mouth 2 (two) times daily with meals.    rosuvastatin (CRESTOR) 10 MG tablet Take 10 mg by mouth once daily.    [DISCONTINUED] HYDROcodone-acetaminophen (NORCO) 5-325 mg per tablet Take 1 tablet by mouth every 6 (six) hours as needed for Pain. (Patient taking differently: Take 2 tablets by mouth every 6 (six) hours as needed for Pain.)    ondansetron (ZOFRAN-ODT) 4 MG TbDL Take 1 tablet (4 mg total) by mouth every 6 (six) hours as needed (nausea).    [DISCONTINUED] HYDROcodone-acetaminophen (NORCO) 7.5-325 mg per tablet Take 1 tablet by mouth 3 (three) times daily.     Family History       Problem Relation (Age of Onset)    Dementia Father    Diabetes Mother    Heart attack Mother    Hypertension Mother    Pancreatic cancer Maternal Uncle    Stomach cancer Maternal Uncle    Stroke Father          Tobacco Use    Smoking status: Never    Smokeless tobacco: Never   Substance and Sexual Activity    Alcohol use: Not Currently    Drug use: Never    Sexual activity: Not Currently     Partners: Male     Review of Systems   Constitutional:  Negative for appetite change, fatigue and fever.   HENT:  Negative for congestion, hearing loss and trouble swallowing.         Wears glasses   Respiratory:  Negative for chest tightness, shortness of breath and wheezing.    Cardiovascular:  Negative for chest pain and palpitations.   Gastrointestinal:  Negative for abdominal pain, constipation and nausea.   Genitourinary:  Negative for difficulty urinating and dysuria.   Musculoskeletal:  Positive for arthralgias and back pain. Negative for neck stiffness.   Skin:  Negative for pallor and rash.   Neurological:  Negative for dizziness, speech difficulty and headaches.   Psychiatric/Behavioral:  Negative for confusion and suicidal ideas.    Objective:     Vital  Signs (Most Recent):  Temp: 98 °F (36.7 °C) (06/27/23 1911)  Pulse: 86 (06/27/23 1911)  Resp: 16 (06/27/23 1911)  BP: 138/71 (06/27/23 1911)  SpO2: 99 % (06/27/23 1911) Vital Signs (24h Range):  Temp:  [97.1 °F (36.2 °C)-98.9 °F (37.2 °C)] 98 °F (36.7 °C)  Pulse:  [75-96] 86  Resp:  [16-18] 16  SpO2:  [98 %-100 %] 99 %  BP: (111-138)/(58-76) 138/71     Weight: 74.4 kg (164 lb)  Body mass index is 30 kg/m².     Physical Exam  Vitals reviewed.   Constitutional:       General: She is awake. She is not in acute distress.     Appearance: She is well-developed. She is not toxic-appearing.   HENT:      Head: Normocephalic.      Nose: Nose normal.      Mouth/Throat:      Pharynx: Oropharynx is clear.   Eyes:      Extraocular Movements: Extraocular movements intact.      Pupils: Pupils are equal, round, and reactive to light.   Neck:      Thyroid: No thyroid mass.      Vascular: No carotid bruit.   Cardiovascular:      Rate and Rhythm: Normal rate and regular rhythm.      Pulses: Normal pulses.      Heart sounds: Normal heart sounds. No murmur heard.  Pulmonary:      Effort: Pulmonary effort is normal.      Breath sounds: Normal breath sounds and air entry. No wheezing.   Abdominal:      General: Bowel sounds are normal. There is no distension.      Palpations: Abdomen is soft.      Tenderness: There is no abdominal tenderness.   Musculoskeletal:      Cervical back: Neck supple. No rigidity.      Comments: Changes consistent with right knee surgery this admission   Skin:     General: Skin is warm.      Coloration: Skin is not jaundiced.      Findings: No lesion.   Neurological:      General: No focal deficit present.      Mental Status: She is alert and oriented to person, place, and time.      Cranial Nerves: No cranial nerve deficit.   Psychiatric:         Attention and Perception: Attention normal.         Mood and Affect: Mood normal.         Behavior: Behavior normal. Behavior is cooperative.         Thought Content:  Thought content normal.         Cognition and Memory: Cognition normal.        Significant Labs: All pertinent labs within the past 24 hours have been reviewed.  BMP:   Recent Labs   Lab 06/27/23  0336   *      K 3.4*      CO2 27   BUN 14   CREATININE 1.30*   CALCIUM 8.2*   MG 1.3*     CBC:   Recent Labs   Lab 06/27/23  0336   WBC 11.22*   HGB 10.1*   HCT 30.8*        CMP:   Recent Labs   Lab 06/27/23  0336      K 3.4*      CO2 27   *   BUN 14   CREATININE 1.30*   CALCIUM 8.2*   PROT 6.0*   ALBUMIN 3.0*   BILITOT 0.3   ALKPHOS 54   AST 16   ALT 17   ANIONGAP 12       Significant Imaging: I have reviewed all pertinent imaging results/findings within the past 24 hours.      Intake/Output - Last 3 Shifts         06/26 0700  06/27 0659 06/27 0700  06/28 0659    P.O. 120     I.V. (mL/kg) 200 (2.7)     IV Piggyback 250     Total Intake(mL/kg) 570 (7.7)     Net +570           Urine Occurrence 3 x 1 x          Microbiology Results (last 7 days)       ** No results found for the last 168 hours. **             Detail Level: Zone Photo Preface (Leave Blank If You Do Not Want): Photographs were obtained today

## 2025-06-04 ENCOUNTER — PATIENT OUTREACH (OUTPATIENT)
Facility: HOSPITAL | Age: 60
End: 2025-06-04
Payer: MEDICARE

## 2025-08-13 DIAGNOSIS — Z01.810 PREOP CARDIOVASCULAR EXAM: Primary | ICD-10-CM

## (undated) DEVICE — SHAVER SYNNOVATOR PLAT SERIES 4.5MM

## (undated) DEVICE — GLOVE SURGICAL PROTEXIS PI CLASSIC SIZE 8.0

## (undated) DEVICE — KIT TOTAL KNEE RUSH

## (undated) DEVICE — BANDAGE ELASTIC FLEX-MASTER 6INX11YD STL DBL LNGTH

## (undated) DEVICE — NDL QUINCKE SPINAL 18G 3.5IN

## (undated) DEVICE — GLOVE BIOGEL SKINSENSE PI 8.0

## (undated) DEVICE — SOL NACL IRR 3000ML

## (undated) DEVICE — DRAPE U STERI 1015 CLEAR 47 1/8X51 1/8

## (undated) DEVICE — SPHERE MARKER REFLECTIVE DISP

## (undated) DEVICE — COVER MAYO STAND W/PAD 23X54IN

## (undated) DEVICE — SUTURE ETHILON 3-0 18 BLK PS2

## (undated) DEVICE — GAUZE XEROFORM 1X8IN

## (undated) DEVICE — PIN THREADED STERILE
Type: IMPLANTABLE DEVICE | Site: KNEE | Status: NON-FUNCTIONAL
Removed: 2023-06-26

## (undated) DEVICE — SKIN STAPLER PMR35

## (undated) DEVICE — SPONGE COTTON TRAY 4X4IN

## (undated) DEVICE — BLADE PERFORMANCE SAG 21X90MM

## (undated) DEVICE — TUBING ARTHRO STRYKER

## (undated) DEVICE — COMPR KNEE STRAIGHT STAY 20IN

## (undated) DEVICE — SOL NACL IRR 1000ML BTL

## (undated) DEVICE — SHAVER BLADE POWERPICK 45DEG

## (undated) DEVICE — BLADE RECIP DOUBLE SIDED

## (undated) DEVICE — CANISTER SUCTION 12000ML DISPOSABLE

## (undated) DEVICE — CDS KNEE ARTHROSCOPY

## (undated) DEVICE — GOWN SURGICAL IMPERVIOUS LEVEL 4 / 2X-LARGE STERILE

## (undated) DEVICE — GLOVE SURGICAL PROTEXIS PI CLASSIC SIZE 7.5

## (undated) DEVICE — TOURNIQUET SB QC SP 34X4IN

## (undated) DEVICE — KIT IRR SUCTION HND PIECE

## (undated) DEVICE — WRAP KNEE ACTIVE PO WITH 4 COLD PKS L/XL

## (undated) DEVICE — TOURNIQUET CUFF DISP QC 34 INCH

## (undated) DEVICE — DRESSING ABD SURGIPAD 8X7.5IN

## (undated) DEVICE — PAD ABDOMINAL 8X7.5 STERILE

## (undated) DEVICE — DRESSING GAUZE XEROFORM 5X9

## (undated) DEVICE — SYRINGE 10-12CC LURE -LOK TIP

## (undated) DEVICE — SYS KNEE EPAK PIN ATTUNE
Type: IMPLANTABLE DEVICE | Site: KNEE | Status: NON-FUNCTIONAL
Removed: 2023-06-26

## (undated) DEVICE — WAND COBLATION WEREWOLF FLOW 50

## (undated) DEVICE — SOL IRRIGATION SALINE 3000ML BAG

## (undated) DEVICE — SET CYSTO IRR DRP CHMBR 84IN

## (undated) DEVICE — GLOVE SURGICAL PROTEXIS PI BLUE SIZE 8.5

## (undated) DEVICE — BLADE SURG CARBON STEEL #10

## (undated) DEVICE — PACK ECLIPSE BASIC III SURG

## (undated) DEVICE — SPONGE GAUZE 4X4 12 PLY STL AMD 10/TRAY

## (undated) DEVICE — NEEDLE ECLIPSE 25GX1IN SAFETY

## (undated) DEVICE — SPACESUIT TOGA T5 ZIPPER PEEL

## (undated) DEVICE — APPLICATOR CHLORAPREP HI-LITE TINTED ORANGE 26ML

## (undated) DEVICE — SUT VICRYL CTD 1 27IN CP

## (undated) DEVICE — GLOVE SURGICAL PROTEXIS PI BLUE SIZE 7.0

## (undated) DEVICE — TUBING SUCTION 5MM STERILE 3/16IN X 12FT

## (undated) DEVICE — WATER BOOM FLOOR SUCTION STRIP

## (undated) DEVICE — DRAPE INCISE IOBAN 2 23X23IN

## (undated) DEVICE — GLOVE 7.0 PROTEXIS PI BLUE

## (undated) DEVICE — SUT 2-0 VICRYL / CT-1

## (undated) DEVICE — TOWER MIX CEMENT BONE SMARTMIX

## (undated) DEVICE — DRAPE ARTHROSCOPY SHEET

## (undated) DEVICE — GLOVE BIOGEL SKINSENSE PI 7.0

## (undated) DEVICE — APPLICATOR CHLORAPREP ORN 26ML

## (undated) DEVICE — SYR 50CC LL

## (undated) DEVICE — GLOVE SURGICAL PROTEXIS PI CLASSIC SIZE 7.0

## (undated) DEVICE — PAD CAST SPECIALIST STRL 3

## (undated) DEVICE — GLOVE BIOGEL SKINSENSE PI 7.5